# Patient Record
Sex: MALE | Race: WHITE | NOT HISPANIC OR LATINO | Employment: PART TIME | URBAN - NONMETROPOLITAN AREA
[De-identification: names, ages, dates, MRNs, and addresses within clinical notes are randomized per-mention and may not be internally consistent; named-entity substitution may affect disease eponyms.]

---

## 2017-12-19 ENCOUNTER — TRANSCRIBE ORDERS (OUTPATIENT)
Dept: ADMINISTRATIVE | Facility: HOSPITAL | Age: 77
End: 2017-12-19

## 2017-12-19 DIAGNOSIS — I43 CARDIOMYOPATHY IN DISEASE CLASSIFIED ELSEWHERE (HCC): ICD-10-CM

## 2017-12-19 DIAGNOSIS — I42.9 CARDIOMYOPATHY, UNSPECIFIED TYPE (HCC): Primary | ICD-10-CM

## 2017-12-20 ENCOUNTER — HOSPITAL ENCOUNTER (OUTPATIENT)
Dept: CARDIOLOGY | Facility: HOSPITAL | Age: 77
Discharge: HOME | End: 2017-12-20
Attending: INTERNAL MEDICINE | Admitting: INTERNAL MEDICINE

## 2017-12-20 DIAGNOSIS — I43 CARDIOMYOPATHY IN DISEASE CLASSIFIED ELSEWHERE (HCC): ICD-10-CM

## 2017-12-20 LAB
BH CV ECHO MEAS - % IVS THICK: -5.4 %
BH CV ECHO MEAS - % LVPW THICK: 10 %
BH CV ECHO MEAS - ACS: 2.8 CM
BH CV ECHO MEAS - AO MAX PG: 11.1 MMHG
BH CV ECHO MEAS - AO MEAN PG: 4.6 MMHG
BH CV ECHO MEAS - AO ROOT AREA (BSA CORRECTED): 2.2
BH CV ECHO MEAS - AO ROOT AREA: 15.8 CM^2
BH CV ECHO MEAS - AO ROOT DIAM: 4.5 CM
BH CV ECHO MEAS - AO V2 MAX: 167 CM/SEC
BH CV ECHO MEAS - AO V2 MEAN: 92.7 CM/SEC
BH CV ECHO MEAS - AO V2 VTI: 32.3 CM
BH CV ECHO MEAS - BSA(HAYCOCK): 2.1 M^2
BH CV ECHO MEAS - BSA: 2.1 M^2
BH CV ECHO MEAS - BZI_BMI: 27.5 KILOGRAMS/M^2
BH CV ECHO MEAS - BZI_METRIC_HEIGHT: 177.8 CM
BH CV ECHO MEAS - BZI_METRIC_WEIGHT: 87.1 KG
BH CV ECHO MEAS - CONTRAST EF 4CH: 48.5 ML/M^2
BH CV ECHO MEAS - EDV(CUBED): 220.9 ML
BH CV ECHO MEAS - EDV(MOD-SP4): 68 ML
BH CV ECHO MEAS - EDV(TEICH): 183.1 ML
BH CV ECHO MEAS - EF(CUBED): 37.5 %
BH CV ECHO MEAS - EF(TEICH): 30.2 %
BH CV ECHO MEAS - ESV(CUBED): 138.1 ML
BH CV ECHO MEAS - ESV(MOD-SP4): 35 ML
BH CV ECHO MEAS - ESV(TEICH): 127.7 ML
BH CV ECHO MEAS - FS: 14.5 %
BH CV ECHO MEAS - IVS/LVPW: 1.1
BH CV ECHO MEAS - IVSD: 1.5 CM
BH CV ECHO MEAS - IVSS: 1.4 CM
BH CV ECHO MEAS - LA DIMENSION: 3.7 CM
BH CV ECHO MEAS - LA/AO: 0.83
BH CV ECHO MEAS - LV DIASTOLIC VOL/BSA (35-75): 33.1 ML/M^2
BH CV ECHO MEAS - LV MASS(C)D: 404.4 GRAMS
BH CV ECHO MEAS - LV MASS(C)DI: 197.1 GRAMS/M^2
BH CV ECHO MEAS - LV MASS(C)S: 324.9 GRAMS
BH CV ECHO MEAS - LV MASS(C)SI: 158.4 GRAMS/M^2
BH CV ECHO MEAS - LV SYSTOLIC VOL/BSA (12-30): 17.1 ML/M^2
BH CV ECHO MEAS - LVIDD: 6 CM
BH CV ECHO MEAS - LVIDS: 5.2 CM
BH CV ECHO MEAS - LVLD AP4: 7 CM
BH CV ECHO MEAS - LVLS AP4: 6.8 CM
BH CV ECHO MEAS - LVOT AREA (M): 3.5 CM^2
BH CV ECHO MEAS - LVOT AREA: 3.4 CM^2
BH CV ECHO MEAS - LVOT DIAM: 2.1 CM
BH CV ECHO MEAS - LVPWD: 1.3 CM
BH CV ECHO MEAS - LVPWS: 1.5 CM
BH CV ECHO MEAS - PA ACC SLOPE: 1577 CM/SEC^2
BH CV ECHO MEAS - PA ACC TIME: 0.08 SEC
BH CV ECHO MEAS - PA PR(ACCEL): 44.1 MMHG
BH CV ECHO MEAS - RVDD: 2.1 CM
BH CV ECHO MEAS - SI(AO): 248.4 ML/M^2
BH CV ECHO MEAS - SI(CUBED): 40.3 ML/M^2
BH CV ECHO MEAS - SI(MOD-SP4): 16.1 ML/M^2
BH CV ECHO MEAS - SI(TEICH): 27 ML/M^2
BH CV ECHO MEAS - SV(AO): 509.6 ML
BH CV ECHO MEAS - SV(CUBED): 82.8 ML
BH CV ECHO MEAS - SV(MOD-SP4): 33 ML
BH CV ECHO MEAS - SV(TEICH): 55.4 ML
MAXIMAL PREDICTED HEART RATE: 145 BPM
STRESS TARGET HR: 123 BPM

## 2017-12-20 PROCEDURE — 93306 TTE W/DOPPLER COMPLETE: CPT | Performed by: INTERNAL MEDICINE

## 2017-12-20 PROCEDURE — 93306 TTE W/DOPPLER COMPLETE: CPT

## 2018-02-12 ENCOUNTER — HOSPITAL ENCOUNTER (OUTPATIENT)
Dept: RESPIRATORY THERAPY | Facility: HOSPITAL | Age: 78
Discharge: HOME | End: 2018-02-12
Attending: INTERNAL MEDICINE | Admitting: INTERNAL MEDICINE

## 2018-02-12 ENCOUNTER — OFFICE VISIT (OUTPATIENT)
Dept: CARDIOLOGY | Facility: CLINIC | Age: 78
End: 2018-02-12

## 2018-02-12 VITALS
RESPIRATION RATE: 16 BRPM | DIASTOLIC BLOOD PRESSURE: 78 MMHG | HEART RATE: 82 BPM | HEIGHT: 70 IN | BODY MASS INDEX: 28.06 KG/M2 | SYSTOLIC BLOOD PRESSURE: 142 MMHG | WEIGHT: 196 LBS

## 2018-02-12 DIAGNOSIS — E78.2 MIXED HYPERLIPIDEMIA: ICD-10-CM

## 2018-02-12 DIAGNOSIS — C61 PROSTATE CANCER (HCC): ICD-10-CM

## 2018-02-12 DIAGNOSIS — I25.10 ASCVD (ARTERIOSCLEROTIC CARDIOVASCULAR DISEASE): Primary | ICD-10-CM

## 2018-02-12 DIAGNOSIS — I10 ESSENTIAL HYPERTENSION: ICD-10-CM

## 2018-02-12 DIAGNOSIS — I49.3 FREQUENT PVCS: ICD-10-CM

## 2018-02-12 DIAGNOSIS — I25.5 ISCHEMIC CARDIOMYOPATHY: ICD-10-CM

## 2018-02-12 DIAGNOSIS — E11.9 TYPE 2 DIABETES MELLITUS WITHOUT COMPLICATION, WITHOUT LONG-TERM CURRENT USE OF INSULIN (HCC): ICD-10-CM

## 2018-02-12 PROCEDURE — 99214 OFFICE O/P EST MOD 30 MIN: CPT | Performed by: INTERNAL MEDICINE

## 2018-02-12 PROCEDURE — 93225 XTRNL ECG REC<48 HRS REC: CPT

## 2018-02-12 PROCEDURE — 93226 XTRNL ECG REC<48 HR SCAN A/R: CPT

## 2018-02-12 PROCEDURE — 93227 XTRNL ECG REC<48 HR R&I: CPT | Performed by: INTERNAL MEDICINE

## 2018-02-12 RX ORDER — INSULIN GLARGINE 100 [IU]/ML
20 INJECTION, SOLUTION SUBCUTANEOUS 2 TIMES DAILY
COMMUNITY
End: 2020-05-08

## 2018-02-21 ENCOUNTER — TELEPHONE (OUTPATIENT)
Dept: CARDIOLOGY | Facility: CLINIC | Age: 78
End: 2018-02-21

## 2018-02-27 RX ORDER — LISINOPRIL 5 MG/1
5 TABLET ORAL DAILY
Qty: 90 TABLET | Refills: 3 | Status: CANCELLED | OUTPATIENT
Start: 2018-02-27

## 2018-02-28 ENCOUNTER — HOSPITAL ENCOUNTER (OUTPATIENT)
Dept: CARDIOLOGY | Facility: HOSPITAL | Age: 78
Discharge: HOME | End: 2018-02-28
Attending: INTERNAL MEDICINE

## 2018-02-28 ENCOUNTER — HOSPITAL ENCOUNTER (OUTPATIENT)
Dept: NUCLEAR MEDICINE | Facility: HOSPITAL | Age: 78
Discharge: HOME | End: 2018-02-28
Attending: INTERNAL MEDICINE

## 2018-02-28 DIAGNOSIS — I25.5 ISCHEMIC CARDIOMYOPATHY: ICD-10-CM

## 2018-02-28 DIAGNOSIS — I25.10 ASCVD (ARTERIOSCLEROTIC CARDIOVASCULAR DISEASE): ICD-10-CM

## 2018-02-28 LAB
BH CV NUCLEAR PRIOR STUDY: 3
BH CV STRESS BP STAGE 1: NORMAL
BH CV STRESS BP STAGE 2: NORMAL
BH CV STRESS COMMENTS STAGE 1: NORMAL
BH CV STRESS COMMENTS STAGE 2: NORMAL
BH CV STRESS DOSE REGADENOSON STAGE 1: 0.4
BH CV STRESS DURATION MIN STAGE 1: 0
BH CV STRESS DURATION MIN STAGE 2: 4
BH CV STRESS DURATION SEC STAGE 1: 10
BH CV STRESS DURATION SEC STAGE 2: 0
BH CV STRESS HR STAGE 1: 99
BH CV STRESS HR STAGE 2: 82
BH CV STRESS PROTOCOL 1: NORMAL
BH CV STRESS RECOVERY BP: NORMAL MMHG
BH CV STRESS RECOVERY HR: 82 BPM
BH CV STRESS STAGE 1: 1
BH CV STRESS STAGE 2: 2
LV EF NUC BP: 35 %
MAXIMAL PREDICTED HEART RATE: 145 BPM
PERCENT MAX PREDICTED HR: 68.28 %
STRESS BASELINE BP: NORMAL MMHG
STRESS BASELINE HR: 71 BPM
STRESS PERCENT HR: 80 %
STRESS POST PEAK BP: NORMAL MMHG
STRESS POST PEAK HR: 99 BPM
STRESS TARGET HR: 123 BPM

## 2018-02-28 PROCEDURE — A9500 TC99M SESTAMIBI: HCPCS | Performed by: INTERNAL MEDICINE

## 2018-02-28 PROCEDURE — 93017 CV STRESS TEST TRACING ONLY: CPT

## 2018-02-28 PROCEDURE — 0 TECHNETIUM SESTAMIBI: Performed by: INTERNAL MEDICINE

## 2018-02-28 PROCEDURE — 93018 CV STRESS TEST I&R ONLY: CPT | Performed by: INTERNAL MEDICINE

## 2018-02-28 PROCEDURE — 78452 HT MUSCLE IMAGE SPECT MULT: CPT | Performed by: INTERNAL MEDICINE

## 2018-02-28 PROCEDURE — 78452 HT MUSCLE IMAGE SPECT MULT: CPT

## 2018-02-28 PROCEDURE — 25010000002 REGADENOSON 0.4 MG/5ML SOLUTION: Performed by: INTERNAL MEDICINE

## 2018-02-28 RX ADMIN — TECHNETIUM TC 99M SESTAMIBI 1 DOSE: 1 INJECTION INTRAVENOUS at 09:20

## 2018-02-28 RX ADMIN — REGADENOSON 0.4 MG: 0.08 INJECTION, SOLUTION INTRAVENOUS at 09:20

## 2018-02-28 RX ADMIN — TECHNETIUM TC 99M SESTAMIBI 1 DOSE: 1 INJECTION INTRAVENOUS at 07:35

## 2018-03-07 ENCOUNTER — TELEPHONE (OUTPATIENT)
Dept: CARDIOLOGY | Facility: CLINIC | Age: 78
End: 2018-03-07

## 2018-03-09 RX ORDER — LISINOPRIL 5 MG/1
5 TABLET ORAL DAILY
Qty: 30 TABLET | Refills: 2 | Status: SHIPPED | OUTPATIENT
Start: 2018-03-09 | End: 2018-03-19

## 2018-03-19 ENCOUNTER — OFFICE VISIT (OUTPATIENT)
Dept: CARDIOLOGY | Facility: CLINIC | Age: 78
End: 2018-03-19

## 2018-03-19 VITALS
HEIGHT: 71 IN | HEART RATE: 84 BPM | SYSTOLIC BLOOD PRESSURE: 140 MMHG | OXYGEN SATURATION: 95 % | DIASTOLIC BLOOD PRESSURE: 72 MMHG | BODY MASS INDEX: 28.48 KG/M2 | WEIGHT: 203.4 LBS

## 2018-03-19 DIAGNOSIS — I25.5 ISCHEMIC CARDIOMYOPATHY: ICD-10-CM

## 2018-03-19 DIAGNOSIS — I10 ESSENTIAL HYPERTENSION: ICD-10-CM

## 2018-03-19 DIAGNOSIS — E78.2 MIXED HYPERLIPIDEMIA: ICD-10-CM

## 2018-03-19 DIAGNOSIS — I25.10 ASCVD (ARTERIOSCLEROTIC CARDIOVASCULAR DISEASE): Primary | ICD-10-CM

## 2018-03-19 PROCEDURE — 99214 OFFICE O/P EST MOD 30 MIN: CPT | Performed by: INTERNAL MEDICINE

## 2018-03-19 RX ORDER — ATORVASTATIN CALCIUM 40 MG/1
40 TABLET, FILM COATED ORAL NIGHTLY
COMMUNITY

## 2018-03-19 RX ORDER — IBUPROFEN 800 MG/1
800 TABLET ORAL EVERY 8 HOURS PRN
COMMUNITY
End: 2018-09-16

## 2018-03-19 RX ORDER — CARVEDILOL 6.25 MG/1
6.25 TABLET ORAL 2 TIMES DAILY
Qty: 60 TABLET | Refills: 5 | Status: SHIPPED | OUTPATIENT
Start: 2018-03-19 | End: 2019-02-08 | Stop reason: SDUPTHER

## 2018-03-19 RX ORDER — NABUMETONE 500 MG/1
500 TABLET, FILM COATED ORAL 2 TIMES DAILY PRN
COMMUNITY
End: 2023-03-30

## 2018-03-19 RX ORDER — GLIMEPIRIDE 2 MG/1
2 TABLET ORAL 2 TIMES DAILY
COMMUNITY

## 2018-03-19 RX ORDER — LISINOPRIL 5 MG/1
10 TABLET ORAL DAILY
Qty: 60 TABLET | Refills: 5 | Status: SHIPPED | OUTPATIENT
Start: 2018-03-19 | End: 2018-06-07 | Stop reason: SDUPTHER

## 2018-03-19 RX ORDER — LISINOPRIL 5 MG/1
10 TABLET ORAL DAILY
Qty: 60 TABLET | Refills: 5 | Status: SHIPPED | OUTPATIENT
Start: 2018-03-19 | End: 2018-03-19 | Stop reason: SDUPTHER

## 2018-04-04 ENCOUNTER — HOSPITAL ENCOUNTER (OUTPATIENT)
Dept: CARDIOLOGY | Facility: HOSPITAL | Age: 78
Discharge: HOME | End: 2018-04-04
Attending: INTERNAL MEDICINE

## 2018-04-18 ENCOUNTER — HOSPITAL ENCOUNTER (OUTPATIENT)
Dept: CARDIOLOGY | Facility: HOSPITAL | Age: 78
Discharge: HOME | End: 2018-04-18
Attending: INTERNAL MEDICINE | Admitting: INTERNAL MEDICINE

## 2018-04-18 DIAGNOSIS — I25.5 ISCHEMIC CARDIOMYOPATHY: ICD-10-CM

## 2018-04-18 PROCEDURE — 93306 TTE W/DOPPLER COMPLETE: CPT

## 2018-04-18 PROCEDURE — 93306 TTE W/DOPPLER COMPLETE: CPT | Performed by: INTERNAL MEDICINE

## 2018-04-22 LAB
BH CV ECHO MEAS - % IVS THICK: -21.8 %
BH CV ECHO MEAS - % LVPW THICK: 24.9 %
BH CV ECHO MEAS - ACS: 1.8 CM
BH CV ECHO MEAS - AO MAX PG: 14.4 MMHG
BH CV ECHO MEAS - AO MEAN PG: 4.9 MMHG
BH CV ECHO MEAS - AO ROOT AREA (BSA CORRECTED): 1.9
BH CV ECHO MEAS - AO ROOT AREA: 12.8 CM^2
BH CV ECHO MEAS - AO ROOT DIAM: 4 CM
BH CV ECHO MEAS - AO V2 MAX: 190 CM/SEC
BH CV ECHO MEAS - AO V2 MEAN: 97.2 CM/SEC
BH CV ECHO MEAS - AO V2 VTI: 28.8 CM
BH CV ECHO MEAS - BSA(HAYCOCK): 2.2 M^2
BH CV ECHO MEAS - BSA: 2.1 M^2
BH CV ECHO MEAS - BZI_BMI: 28.3 KILOGRAMS/M^2
BH CV ECHO MEAS - BZI_METRIC_HEIGHT: 180.3 CM
BH CV ECHO MEAS - BZI_METRIC_WEIGHT: 92.1 KG
BH CV ECHO MEAS - CONTRAST EF 4CH: 20.3 ML/M^2
BH CV ECHO MEAS - EDV(CUBED): 135.8 ML
BH CV ECHO MEAS - EDV(MOD-SP4): 69 ML
BH CV ECHO MEAS - EDV(TEICH): 126.1 ML
BH CV ECHO MEAS - EF(CUBED): 21.5 %
BH CV ECHO MEAS - EF(MOD-SP4): 20.3 %
BH CV ECHO MEAS - EF(TEICH): 17.1 %
BH CV ECHO MEAS - ESV(CUBED): 106.6 ML
BH CV ECHO MEAS - ESV(MOD-SP4): 55 ML
BH CV ECHO MEAS - ESV(TEICH): 104.5 ML
BH CV ECHO MEAS - FS: 7.8 %
BH CV ECHO MEAS - IVS/LVPW: 1.3
BH CV ECHO MEAS - IVSD: 1.8 CM
BH CV ECHO MEAS - IVSS: 1.4 CM
BH CV ECHO MEAS - LA DIMENSION: 4.3 CM
BH CV ECHO MEAS - LA/AO: 1.1
BH CV ECHO MEAS - LV DIASTOLIC VOL/BSA (35-75): 32.5 ML/M^2
BH CV ECHO MEAS - LV MASS(C)D: 359.7 GRAMS
BH CV ECHO MEAS - LV MASS(C)DI: 169.5 GRAMS/M^2
BH CV ECHO MEAS - LV MASS(C)S: 311.8 GRAMS
BH CV ECHO MEAS - LV MASS(C)SI: 146.9 GRAMS/M^2
BH CV ECHO MEAS - LV SYSTOLIC VOL/BSA (12-30): 25.9 ML/M^2
BH CV ECHO MEAS - LVIDD: 5.1 CM
BH CV ECHO MEAS - LVIDS: 4.7 CM
BH CV ECHO MEAS - LVLD AP4: 7.3 CM
BH CV ECHO MEAS - LVLS AP4: 7.4 CM
BH CV ECHO MEAS - LVOT AREA (M): 4.9 CM^2
BH CV ECHO MEAS - LVOT AREA: 5.1 CM^2
BH CV ECHO MEAS - LVOT DIAM: 2.5 CM
BH CV ECHO MEAS - LVPWD: 1.4 CM
BH CV ECHO MEAS - LVPWS: 1.7 CM
BH CV ECHO MEAS - MV A MAX VEL: 83.4 CM/SEC
BH CV ECHO MEAS - MV E MAX VEL: 35.5 CM/SEC
BH CV ECHO MEAS - MV E/A: 0.43
BH CV ECHO MEAS - PA ACC SLOPE: 1171 CM/SEC^2
BH CV ECHO MEAS - PA ACC TIME: 0.09 SEC
BH CV ECHO MEAS - PA PR(ACCEL): 37.8 MMHG
BH CV ECHO MEAS - RVDD: 0.94 CM
BH CV ECHO MEAS - SI(AO): 173.1 ML/M^2
BH CV ECHO MEAS - SI(CUBED): 13.8 ML/M^2
BH CV ECHO MEAS - SI(MOD-SP4): 6.6 ML/M^2
BH CV ECHO MEAS - SI(TEICH): 10.2 ML/M^2
BH CV ECHO MEAS - SV(AO): 367.3 ML
BH CV ECHO MEAS - SV(CUBED): 29.2 ML
BH CV ECHO MEAS - SV(MOD-SP4): 14 ML
BH CV ECHO MEAS - SV(TEICH): 21.6 ML

## 2018-04-24 DIAGNOSIS — I25.5 ISCHEMIC CARDIOMYOPATHY: Primary | ICD-10-CM

## 2018-04-30 DIAGNOSIS — I25.5 ISCHEMIC CARDIOMYOPATHY: Primary | ICD-10-CM

## 2018-06-07 RX ORDER — LISINOPRIL 10 MG/1
10 TABLET ORAL DAILY
Qty: 90 TABLET | Refills: 3 | Status: SHIPPED | OUTPATIENT
Start: 2018-06-07

## 2018-07-11 PROBLEM — I44.7 LBBB (LEFT BUNDLE BRANCH BLOCK): Status: ACTIVE | Noted: 2018-07-11

## 2018-07-13 ENCOUNTER — CONSULT (OUTPATIENT)
Dept: CARDIOLOGY | Facility: CLINIC | Age: 78
End: 2018-07-13

## 2018-07-13 VITALS
WEIGHT: 195 LBS | HEART RATE: 63 BPM | BODY MASS INDEX: 27.3 KG/M2 | HEIGHT: 71 IN | DIASTOLIC BLOOD PRESSURE: 71 MMHG | SYSTOLIC BLOOD PRESSURE: 129 MMHG

## 2018-07-13 DIAGNOSIS — I25.10 ASCVD (ARTERIOSCLEROTIC CARDIOVASCULAR DISEASE): ICD-10-CM

## 2018-07-13 DIAGNOSIS — I44.7 LBBB (LEFT BUNDLE BRANCH BLOCK): ICD-10-CM

## 2018-07-13 DIAGNOSIS — I10 ESSENTIAL HYPERTENSION: ICD-10-CM

## 2018-07-13 DIAGNOSIS — I25.5 ISCHEMIC CARDIOMYOPATHY: Primary | ICD-10-CM

## 2018-07-13 PROCEDURE — 93000 ELECTROCARDIOGRAM COMPLETE: CPT | Performed by: NURSE PRACTITIONER

## 2018-07-13 PROCEDURE — 99204 OFFICE O/P NEW MOD 45 MIN: CPT | Performed by: INTERNAL MEDICINE

## 2018-08-02 ENCOUNTER — TELEPHONE (OUTPATIENT)
Dept: CARDIOLOGY | Facility: CLINIC | Age: 78
End: 2018-08-02

## 2018-08-02 DIAGNOSIS — I25.5 ISCHEMIC CARDIOMYOPATHY: Primary | ICD-10-CM

## 2018-09-05 ENCOUNTER — PREP FOR SURGERY (OUTPATIENT)
Dept: OTHER | Facility: HOSPITAL | Age: 78
End: 2018-09-05

## 2018-09-05 DIAGNOSIS — I25.5 ISCHEMIC CARDIOMYOPATHY: Primary | ICD-10-CM

## 2018-09-05 RX ORDER — CEFAZOLIN SODIUM 2 G/100ML
2 INJECTION, SOLUTION INTRAVENOUS
Status: CANCELLED | OUTPATIENT
Start: 2018-09-06 | End: 2018-09-07

## 2018-09-05 RX ORDER — NITROGLYCERIN 0.4 MG/1
0.4 TABLET SUBLINGUAL
Status: CANCELLED | OUTPATIENT
Start: 2018-09-05

## 2018-09-05 RX ORDER — ACETAMINOPHEN 325 MG/1
650 TABLET ORAL EVERY 4 HOURS PRN
Status: CANCELLED | OUTPATIENT
Start: 2018-09-05

## 2018-09-05 RX ORDER — PROMETHAZINE HYDROCHLORIDE 25 MG/ML
12.5 INJECTION, SOLUTION INTRAMUSCULAR; INTRAVENOUS EVERY 4 HOURS PRN
Status: CANCELLED | OUTPATIENT
Start: 2018-09-05

## 2018-09-05 RX ORDER — SODIUM CHLORIDE 0.9 % (FLUSH) 0.9 %
1-10 SYRINGE (ML) INJECTION AS NEEDED
Status: CANCELLED | OUTPATIENT
Start: 2018-09-05

## 2018-09-16 ENCOUNTER — APPOINTMENT (OUTPATIENT)
Dept: PREADMISSION TESTING | Facility: HOSPITAL | Age: 78
End: 2018-09-16

## 2018-09-16 DIAGNOSIS — I25.5 ISCHEMIC CARDIOMYOPATHY: ICD-10-CM

## 2018-09-16 LAB
ANION GAP SERPL CALCULATED.3IONS-SCNC: 9 MMOL/L (ref 3–11)
BUN BLD-MCNC: 21 MG/DL (ref 9–23)
BUN/CREAT SERPL: 21.2 (ref 7–25)
CALCIUM SPEC-SCNC: 9.1 MG/DL (ref 8.7–10.4)
CHLORIDE SERPL-SCNC: 104 MMOL/L (ref 99–109)
CO2 SERPL-SCNC: 28 MMOL/L (ref 20–31)
CREAT BLD-MCNC: 0.99 MG/DL (ref 0.6–1.3)
DEPRECATED RDW RBC AUTO: 43.2 FL (ref 37–54)
ERYTHROCYTE [DISTWIDTH] IN BLOOD BY AUTOMATED COUNT: 12.9 % (ref 11.3–14.5)
GFR SERPL CREATININE-BSD FRML MDRD: 73 ML/MIN/1.73
GLUCOSE BLD-MCNC: 233 MG/DL (ref 70–100)
HBA1C MFR BLD: 9.3 % (ref 4.8–5.6)
HCT VFR BLD AUTO: 40.6 % (ref 38.9–50.9)
HGB BLD-MCNC: 13.7 G/DL (ref 13.1–17.5)
INR PPP: 0.96 (ref 0.91–1.09)
MCH RBC QN AUTO: 31.1 PG (ref 27–31)
MCHC RBC AUTO-ENTMCNC: 33.7 G/DL (ref 32–36)
MCV RBC AUTO: 92.1 FL (ref 80–99)
PLATELET # BLD AUTO: 142 10*3/MM3 (ref 150–450)
PMV BLD AUTO: 9.2 FL (ref 6–12)
POTASSIUM BLD-SCNC: 4.8 MMOL/L (ref 3.5–5.5)
PROTHROMBIN TIME: 10.1 SECONDS (ref 9.6–11.5)
RBC # BLD AUTO: 4.41 10*6/MM3 (ref 4.2–5.76)
SODIUM BLD-SCNC: 141 MMOL/L (ref 132–146)
WBC NRBC COR # BLD: 6.72 10*3/MM3 (ref 3.5–10.8)

## 2018-09-16 PROCEDURE — 85027 COMPLETE CBC AUTOMATED: CPT | Performed by: NURSE PRACTITIONER

## 2018-09-16 PROCEDURE — 80048 BASIC METABOLIC PNL TOTAL CA: CPT | Performed by: NURSE PRACTITIONER

## 2018-09-16 PROCEDURE — 85610 PROTHROMBIN TIME: CPT | Performed by: NURSE PRACTITIONER

## 2018-09-16 PROCEDURE — 83036 HEMOGLOBIN GLYCOSYLATED A1C: CPT | Performed by: NURSE PRACTITIONER

## 2018-09-16 PROCEDURE — 36415 COLL VENOUS BLD VENIPUNCTURE: CPT

## 2018-09-17 DIAGNOSIS — I25.5 ISCHEMIC CARDIOMYOPATHY: Primary | ICD-10-CM

## 2018-09-19 ENCOUNTER — APPOINTMENT (OUTPATIENT)
Dept: PREADMISSION TESTING | Facility: HOSPITAL | Age: 78
End: 2018-09-19

## 2018-09-20 ENCOUNTER — APPOINTMENT (OUTPATIENT)
Dept: CARDIOLOGY | Facility: HOSPITAL | Age: 78
End: 2018-09-20
Attending: INTERNAL MEDICINE

## 2018-09-20 ENCOUNTER — HOSPITAL ENCOUNTER (OUTPATIENT)
Facility: HOSPITAL | Age: 78
Discharge: HOME | End: 2018-09-21
Attending: INTERNAL MEDICINE | Admitting: INTERNAL MEDICINE

## 2018-09-20 DIAGNOSIS — I25.5 ISCHEMIC CARDIOMYOPATHY: ICD-10-CM

## 2018-09-20 LAB
BH CV ECHO MEAS - EF(MOD-BP): 27 %
BH CV ECHO MEAS - FS: 11 %
BH CV ECHO MEAS - IVSD: 1.1 CM
BH CV ECHO MEAS - LAT PEAK E' VEL: 5.2 CM/SEC
BH CV ECHO MEAS - LVIDD: 6 CM
BH CV ECHO MEAS - LVIDS: 5.4 CM
BH CV ECHO MEAS - LVPWD: 1.1 CM
BH CV ECHO MEAS - MED PEAK E' VEL: 3 CM/SEC
BH CV ECHO MEAS - MV E MAX VEL: 43.5 CM/SEC
BH CV ECHO MEASUREMENTS AVERAGE E/E' RATIO: 10.61
BH CV VAS BP RIGHT ARM: NORMAL MMHG
GLUCOSE BLDC GLUCOMTR-MCNC: 117 MG/DL (ref 70–130)
GLUCOSE BLDC GLUCOMTR-MCNC: 203 MG/DL (ref 70–130)
GLUCOSE BLDC GLUCOMTR-MCNC: 274 MG/DL (ref 70–130)
LEFT ATRIUM VOLUME INDEX: 31.2 ML/M2
LV EF 2D ECHO EST: 25 %
MAXIMAL PREDICTED HEART RATE: 144 BPM
STRESS TARGET HR: 122 BPM

## 2018-09-20 PROCEDURE — A9270 NON-COVERED ITEM OR SERVICE: HCPCS | Performed by: NURSE PRACTITIONER

## 2018-09-20 PROCEDURE — C1900 LEAD, CORONARY VENOUS: HCPCS | Performed by: INTERNAL MEDICINE

## 2018-09-20 PROCEDURE — A9270 NON-COVERED ITEM OR SERVICE: HCPCS | Performed by: INTERNAL MEDICINE

## 2018-09-20 PROCEDURE — 33249 INSJ/RPLCMT DEFIB W/LEAD(S): CPT | Performed by: INTERNAL MEDICINE

## 2018-09-20 PROCEDURE — C1882 AICD, OTHER THAN SING/DUAL: HCPCS | Performed by: INTERNAL MEDICINE

## 2018-09-20 PROCEDURE — 63710000001 ATORVASTATIN 40 MG TABLET: Performed by: NURSE PRACTITIONER

## 2018-09-20 PROCEDURE — C1769 GUIDE WIRE: HCPCS | Performed by: INTERNAL MEDICINE

## 2018-09-20 PROCEDURE — G0378 HOSPITAL OBSERVATION PER HR: HCPCS

## 2018-09-20 PROCEDURE — 93308 TTE F-UP OR LMTD: CPT

## 2018-09-20 PROCEDURE — 25010000003 CEFAZOLIN IN DEXTROSE 2-4 GM/100ML-% SOLUTION: Performed by: INTERNAL MEDICINE

## 2018-09-20 PROCEDURE — 93306 TTE W/DOPPLER COMPLETE: CPT

## 2018-09-20 PROCEDURE — 63710000001 INSULIN DETEMIR PER 5 UNITS: Performed by: NURSE PRACTITIONER

## 2018-09-20 PROCEDURE — C1892 INTRO/SHEATH,FIXED,PEEL-AWAY: HCPCS | Performed by: INTERNAL MEDICINE

## 2018-09-20 PROCEDURE — 93005 ELECTROCARDIOGRAM TRACING: CPT | Performed by: INTERNAL MEDICINE

## 2018-09-20 PROCEDURE — C1777 LEAD, AICD, ENDO SINGLE COIL: HCPCS | Performed by: INTERNAL MEDICINE

## 2018-09-20 PROCEDURE — 25010000002 MIDAZOLAM PER 1 MG: Performed by: INTERNAL MEDICINE

## 2018-09-20 PROCEDURE — 93325 DOPPLER ECHO COLOR FLOW MAPG: CPT

## 2018-09-20 PROCEDURE — C1730 CATH, EP, 19 OR FEW ELECT: HCPCS | Performed by: INTERNAL MEDICINE

## 2018-09-20 PROCEDURE — 93641 EP EVL 1/2CHMB PAC CVDFB TST: CPT | Performed by: INTERNAL MEDICINE

## 2018-09-20 PROCEDURE — 93321 DOPPLER ECHO F-UP/LMTD STD: CPT

## 2018-09-20 PROCEDURE — G0108 DIAB MANAGE TRN  PER INDIV: HCPCS | Performed by: REGISTERED NURSE

## 2018-09-20 PROCEDURE — C1898 LEAD, PMKR, OTHER THAN TRANS: HCPCS | Performed by: INTERNAL MEDICINE

## 2018-09-20 PROCEDURE — 93010 ELECTROCARDIOGRAM REPORT: CPT | Performed by: INTERNAL MEDICINE

## 2018-09-20 PROCEDURE — 25010000002 ONDANSETRON PER 1 MG: Performed by: INTERNAL MEDICINE

## 2018-09-20 PROCEDURE — 93325 DOPPLER ECHO COLOR FLOW MAPG: CPT | Performed by: INTERNAL MEDICINE

## 2018-09-20 PROCEDURE — 33225 L VENTRIC PACING LEAD ADD-ON: CPT | Performed by: INTERNAL MEDICINE

## 2018-09-20 PROCEDURE — 63710000001 INSULIN LISPRO (HUMAN) PER 5 UNITS: Performed by: INTERNAL MEDICINE

## 2018-09-20 PROCEDURE — 82962 GLUCOSE BLOOD TEST: CPT

## 2018-09-20 PROCEDURE — 99152 MOD SED SAME PHYS/QHP 5/>YRS: CPT | Performed by: INTERNAL MEDICINE

## 2018-09-20 PROCEDURE — 63710000001 TAMSULOSIN 0.4 MG CAPSULE: Performed by: NURSE PRACTITIONER

## 2018-09-20 PROCEDURE — 25010000002 FENTANYL CITRATE (PF) 100 MCG/2ML SOLUTION: Performed by: INTERNAL MEDICINE

## 2018-09-20 PROCEDURE — C1887 CATHETER, GUIDING: HCPCS | Performed by: INTERNAL MEDICINE

## 2018-09-20 PROCEDURE — 63710000001 CARVEDILOL 6.25 MG TABLET: Performed by: NURSE PRACTITIONER

## 2018-09-20 PROCEDURE — 25010000002 SULFUR HEXAFLUORIDE MICROSPH 60.7-25 MG RECONSTITUTED SUSPENSION: Performed by: INTERNAL MEDICINE

## 2018-09-20 PROCEDURE — 93321 DOPPLER ECHO F-UP/LMTD STD: CPT | Performed by: INTERNAL MEDICINE

## 2018-09-20 PROCEDURE — 63710000001 ASPIRIN 81 MG TABLET DELAYED-RELEASE: Performed by: NURSE PRACTITIONER

## 2018-09-20 PROCEDURE — 93308 TTE F-UP OR LMTD: CPT | Performed by: INTERNAL MEDICINE

## 2018-09-20 PROCEDURE — 0 IOPAMIDOL PER 1 ML: Performed by: INTERNAL MEDICINE

## 2018-09-20 DEVICE — CARDIAC RESYNCHRONIZATION THERAPY DEFIBRILLATOR
Type: IMPLANTABLE DEVICE | Status: FUNCTIONAL
Brand: VIGILANT™ X4 CRT-D

## 2018-09-20 DEVICE — PACE/SENSE LEAD
Type: IMPLANTABLE DEVICE | Status: FUNCTIONAL
Brand: ACUITY™ X4 SPIRAL L

## 2018-09-20 DEVICE — STEROX BIPOLAR IS-1 ATRIAL/VENTRICULAR
Type: IMPLANTABLE DEVICE | Status: FUNCTIONAL
Brand: FINELINE® II EZ STEROX

## 2018-09-20 DEVICE — STEROID-ELUTING BIPOLAR PACE/SENSE AND DEFIBRILLATION LEAD
Type: IMPLANTABLE DEVICE | Status: FUNCTIONAL
Brand: ENDOTAK RELIANCE® SG

## 2018-09-20 RX ORDER — LISINOPRIL 10 MG/1
10 TABLET ORAL EVERY MORNING
Status: DISCONTINUED | OUTPATIENT
Start: 2018-09-21 | End: 2018-09-21 | Stop reason: HOSPADM

## 2018-09-20 RX ORDER — DEXTROSE MONOHYDRATE 25 G/50ML
25 INJECTION, SOLUTION INTRAVENOUS
Status: DISCONTINUED | OUTPATIENT
Start: 2018-09-20 | End: 2018-09-21 | Stop reason: HOSPADM

## 2018-09-20 RX ORDER — TAMSULOSIN HYDROCHLORIDE 0.4 MG/1
0.4 CAPSULE ORAL NIGHTLY
Status: DISCONTINUED | OUTPATIENT
Start: 2018-09-20 | End: 2018-09-21 | Stop reason: HOSPADM

## 2018-09-20 RX ORDER — SODIUM CHLORIDE 0.9 % (FLUSH) 0.9 %
1-10 SYRINGE (ML) INJECTION AS NEEDED
Status: DISCONTINUED | OUTPATIENT
Start: 2018-09-20 | End: 2018-09-21 | Stop reason: HOSPADM

## 2018-09-20 RX ORDER — ONDANSETRON 2 MG/ML
INJECTION INTRAMUSCULAR; INTRAVENOUS AS NEEDED
Status: DISCONTINUED | OUTPATIENT
Start: 2018-09-20 | End: 2018-09-20 | Stop reason: HOSPADM

## 2018-09-20 RX ORDER — ACETAMINOPHEN 325 MG/1
650 TABLET ORAL EVERY 4 HOURS PRN
Status: DISCONTINUED | OUTPATIENT
Start: 2018-09-20 | End: 2018-09-20 | Stop reason: HOSPADM

## 2018-09-20 RX ORDER — ONDANSETRON 2 MG/ML
4 INJECTION INTRAMUSCULAR; INTRAVENOUS EVERY 6 HOURS PRN
Status: DISCONTINUED | OUTPATIENT
Start: 2018-09-20 | End: 2018-09-21 | Stop reason: HOSPADM

## 2018-09-20 RX ORDER — BUPIVACAINE HYDROCHLORIDE 5 MG/ML
INJECTION, SOLUTION EPIDURAL; INTRACAUDAL AS NEEDED
Status: DISCONTINUED | OUTPATIENT
Start: 2018-09-20 | End: 2018-09-20 | Stop reason: HOSPADM

## 2018-09-20 RX ORDER — OXYCODONE HYDROCHLORIDE AND ACETAMINOPHEN 5; 325 MG/1; MG/1
1 TABLET ORAL EVERY 4 HOURS PRN
Status: DISCONTINUED | OUTPATIENT
Start: 2018-09-20 | End: 2018-09-21 | Stop reason: HOSPADM

## 2018-09-20 RX ORDER — FENTANYL CITRATE 50 UG/ML
INJECTION, SOLUTION INTRAMUSCULAR; INTRAVENOUS AS NEEDED
Status: DISCONTINUED | OUTPATIENT
Start: 2018-09-20 | End: 2018-09-20 | Stop reason: HOSPADM

## 2018-09-20 RX ORDER — PROMETHAZINE HYDROCHLORIDE 25 MG/ML
12.5 INJECTION, SOLUTION INTRAMUSCULAR; INTRAVENOUS EVERY 4 HOURS PRN
Status: DISCONTINUED | OUTPATIENT
Start: 2018-09-20 | End: 2018-09-20 | Stop reason: HOSPADM

## 2018-09-20 RX ORDER — LIDOCAINE HYDROCHLORIDE 10 MG/ML
INJECTION, SOLUTION INFILTRATION; PERINEURAL AS NEEDED
Status: DISCONTINUED | OUTPATIENT
Start: 2018-09-20 | End: 2018-09-20 | Stop reason: HOSPADM

## 2018-09-20 RX ORDER — ATORVASTATIN CALCIUM 40 MG/1
40 TABLET, FILM COATED ORAL NIGHTLY
Status: DISCONTINUED | OUTPATIENT
Start: 2018-09-20 | End: 2018-09-21 | Stop reason: HOSPADM

## 2018-09-20 RX ORDER — CEFAZOLIN SODIUM 2 G/100ML
2 INJECTION, SOLUTION INTRAVENOUS
Status: COMPLETED | OUTPATIENT
Start: 2018-09-21 | End: 2018-09-20

## 2018-09-20 RX ORDER — NICOTINE POLACRILEX 4 MG
15 LOZENGE BUCCAL
Status: DISCONTINUED | OUTPATIENT
Start: 2018-09-20 | End: 2018-09-21 | Stop reason: HOSPADM

## 2018-09-20 RX ORDER — CARVEDILOL 6.25 MG/1
6.25 TABLET ORAL 2 TIMES DAILY
Status: DISCONTINUED | OUTPATIENT
Start: 2018-09-20 | End: 2018-09-21 | Stop reason: HOSPADM

## 2018-09-20 RX ORDER — ASPIRIN 81 MG/1
81 TABLET ORAL NIGHTLY
Status: DISCONTINUED | OUTPATIENT
Start: 2018-09-20 | End: 2018-09-21 | Stop reason: HOSPADM

## 2018-09-20 RX ORDER — MIDAZOLAM HYDROCHLORIDE 1 MG/ML
INJECTION INTRAMUSCULAR; INTRAVENOUS AS NEEDED
Status: DISCONTINUED | OUTPATIENT
Start: 2018-09-20 | End: 2018-09-20 | Stop reason: HOSPADM

## 2018-09-20 RX ORDER — SODIUM CHLORIDE 0.9 % (FLUSH) 0.9 %
1-10 SYRINGE (ML) INJECTION AS NEEDED
Status: DISCONTINUED | OUTPATIENT
Start: 2018-09-20 | End: 2018-09-20 | Stop reason: HOSPADM

## 2018-09-20 RX ORDER — NITROGLYCERIN 0.4 MG/1
0.4 TABLET SUBLINGUAL
Status: DISCONTINUED | OUTPATIENT
Start: 2018-09-20 | End: 2018-09-20 | Stop reason: HOSPADM

## 2018-09-20 RX ORDER — PANTOPRAZOLE SODIUM 40 MG/1
40 TABLET, DELAYED RELEASE ORAL EVERY MORNING
Status: DISCONTINUED | OUTPATIENT
Start: 2018-09-21 | End: 2018-09-21 | Stop reason: HOSPADM

## 2018-09-20 RX ORDER — CEFAZOLIN SODIUM 2 G/100ML
2 INJECTION, SOLUTION INTRAVENOUS EVERY 8 HOURS
Status: COMPLETED | OUTPATIENT
Start: 2018-09-20 | End: 2018-09-21

## 2018-09-20 RX ADMIN — CARVEDILOL 6.25 MG: 6.25 TABLET, FILM COATED ORAL at 22:22

## 2018-09-20 RX ADMIN — TAMSULOSIN HYDROCHLORIDE 0.4 MG: 0.4 CAPSULE ORAL at 22:22

## 2018-09-20 RX ADMIN — SULFUR HEXAFLUORIDE 2 ML: KIT at 13:50

## 2018-09-20 RX ADMIN — INSULIN DETEMIR 20 UNITS: 100 INJECTION, SOLUTION SUBCUTANEOUS at 22:23

## 2018-09-20 RX ADMIN — ATORVASTATIN CALCIUM 40 MG: 40 TABLET, FILM COATED ORAL at 22:23

## 2018-09-20 RX ADMIN — CEFAZOLIN SODIUM 2 G: 2 INJECTION, SOLUTION INTRAVENOUS at 22:21

## 2018-09-20 RX ADMIN — ASPIRIN 81 MG: 81 TABLET, COATED ORAL at 22:22

## 2018-09-20 RX ADMIN — CEFAZOLIN SODIUM 2 G: 2 INJECTION, SOLUTION INTRAVENOUS at 16:55

## 2018-09-20 RX ADMIN — INSULIN LISPRO 6 UNITS: 100 INJECTION, SOLUTION INTRAVENOUS; SUBCUTANEOUS at 22:21

## 2018-09-21 ENCOUNTER — APPOINTMENT (OUTPATIENT)
Dept: GENERAL RADIOLOGY | Facility: HOSPITAL | Age: 78
End: 2018-09-21

## 2018-09-21 VITALS
DIASTOLIC BLOOD PRESSURE: 67 MMHG | OXYGEN SATURATION: 96 % | RESPIRATION RATE: 9 BRPM | HEIGHT: 70 IN | TEMPERATURE: 98 F | SYSTOLIC BLOOD PRESSURE: 121 MMHG | WEIGHT: 189 LBS | HEART RATE: 61 BPM | BODY MASS INDEX: 27.06 KG/M2

## 2018-09-21 LAB — GLUCOSE BLDC GLUCOMTR-MCNC: 119 MG/DL (ref 70–130)

## 2018-09-21 PROCEDURE — 63710000001 OXYCODONE-ACETAMINOPHEN 5-325 MG TABLET: Performed by: INTERNAL MEDICINE

## 2018-09-21 PROCEDURE — 99024 POSTOP FOLLOW-UP VISIT: CPT | Performed by: INTERNAL MEDICINE

## 2018-09-21 PROCEDURE — G0378 HOSPITAL OBSERVATION PER HR: HCPCS

## 2018-09-21 PROCEDURE — 71046 X-RAY EXAM CHEST 2 VIEWS: CPT

## 2018-09-21 PROCEDURE — A9270 NON-COVERED ITEM OR SERVICE: HCPCS | Performed by: NURSE PRACTITIONER

## 2018-09-21 PROCEDURE — 93005 ELECTROCARDIOGRAM TRACING: CPT | Performed by: INTERNAL MEDICINE

## 2018-09-21 PROCEDURE — 63710000001 CARVEDILOL 6.25 MG TABLET: Performed by: NURSE PRACTITIONER

## 2018-09-21 PROCEDURE — 63710000001 LISINOPRIL 10 MG TABLET: Performed by: NURSE PRACTITIONER

## 2018-09-21 PROCEDURE — 82962 GLUCOSE BLOOD TEST: CPT

## 2018-09-21 PROCEDURE — 63710000001 PANTOPRAZOLE 40 MG TABLET DELAYED-RELEASE: Performed by: NURSE PRACTITIONER

## 2018-09-21 PROCEDURE — 25010000003 CEFAZOLIN IN DEXTROSE 2-4 GM/100ML-% SOLUTION: Performed by: INTERNAL MEDICINE

## 2018-09-21 PROCEDURE — 93010 ELECTROCARDIOGRAM REPORT: CPT | Performed by: INTERNAL MEDICINE

## 2018-09-21 PROCEDURE — 25010000003 CEFAZOLIN IN DEXTROSE 2-4 GM/100ML-% SOLUTION: Performed by: NURSE PRACTITIONER

## 2018-09-21 PROCEDURE — A9270 NON-COVERED ITEM OR SERVICE: HCPCS | Performed by: INTERNAL MEDICINE

## 2018-09-21 RX ADMIN — CARVEDILOL 6.25 MG: 6.25 TABLET, FILM COATED ORAL at 09:50

## 2018-09-21 RX ADMIN — LISINOPRIL 10 MG: 10 TABLET ORAL at 05:30

## 2018-09-21 RX ADMIN — PANTOPRAZOLE SODIUM 40 MG: 40 TABLET, DELAYED RELEASE ORAL at 05:30

## 2018-09-21 RX ADMIN — OXYCODONE HYDROCHLORIDE AND ACETAMINOPHEN 1 TABLET: 5; 325 TABLET ORAL at 04:13

## 2018-09-21 RX ADMIN — CEFAZOLIN SODIUM 2 G: 2 INJECTION, SOLUTION INTRAVENOUS at 05:29

## 2018-10-03 RX ORDER — CARVEDILOL 6.25 MG/1
TABLET ORAL
Qty: 60 TABLET | Refills: 4 | OUTPATIENT
Start: 2018-10-03

## 2018-10-05 RX ORDER — CARVEDILOL 6.25 MG/1
TABLET ORAL
Qty: 60 TABLET | Refills: 4 | OUTPATIENT
Start: 2018-10-05

## 2018-10-16 DIAGNOSIS — I25.5 ISCHEMIC CARDIOMYOPATHY: Primary | ICD-10-CM

## 2018-11-02 ENCOUNTER — TELEPHONE (OUTPATIENT)
Dept: CARDIOLOGY | Facility: CLINIC | Age: 78
End: 2018-11-02

## 2019-02-08 ENCOUNTER — OFFICE VISIT (OUTPATIENT)
Dept: CARDIOLOGY | Facility: CLINIC | Age: 79
End: 2019-02-08

## 2019-02-08 VITALS
BODY MASS INDEX: 27.2 KG/M2 | WEIGHT: 190 LBS | SYSTOLIC BLOOD PRESSURE: 154 MMHG | HEART RATE: 74 BPM | HEIGHT: 70 IN | DIASTOLIC BLOOD PRESSURE: 86 MMHG

## 2019-02-08 DIAGNOSIS — I10 ESSENTIAL HYPERTENSION: ICD-10-CM

## 2019-02-08 DIAGNOSIS — Z95.810 CARDIAC RESYNCHRONIZATION THERAPY DEFIBRILLATOR (CRT-D) IN PLACE: ICD-10-CM

## 2019-02-08 DIAGNOSIS — I25.5 ISCHEMIC CARDIOMYOPATHY: Primary | ICD-10-CM

## 2019-02-08 DIAGNOSIS — I44.7 LBBB (LEFT BUNDLE BRANCH BLOCK): ICD-10-CM

## 2019-02-08 PROBLEM — Z45.02 ICD (IMPLANTABLE CARDIOVERTER-DEFIBRILLATOR) BATTERY DEPLETION: Status: ACTIVE | Noted: 2019-02-08

## 2019-02-08 PROCEDURE — 93284 PRGRMG EVAL IMPLANTABLE DFB: CPT | Performed by: NURSE PRACTITIONER

## 2019-02-08 PROCEDURE — 99214 OFFICE O/P EST MOD 30 MIN: CPT | Performed by: NURSE PRACTITIONER

## 2019-02-08 RX ORDER — CARVEDILOL 6.25 MG/1
12.5 TABLET ORAL 2 TIMES DAILY
Qty: 60 TABLET | Refills: 5 | Status: SHIPPED | OUTPATIENT
Start: 2019-02-08 | End: 2019-06-04 | Stop reason: SDUPTHER

## 2019-03-06 ENCOUNTER — TELEPHONE (OUTPATIENT)
Dept: CARDIOLOGY | Facility: CLINIC | Age: 79
End: 2019-03-06

## 2019-04-01 ENCOUNTER — TELEPHONE (OUTPATIENT)
Dept: CARDIOLOGY | Facility: CLINIC | Age: 79
End: 2019-04-01

## 2019-04-08 ENCOUNTER — APPOINTMENT (OUTPATIENT)
Dept: CARDIOLOGY | Facility: HOSPITAL | Age: 79
End: 2019-04-08
Attending: INTERNAL MEDICINE

## 2019-06-04 RX ORDER — CARVEDILOL 6.25 MG/1
TABLET ORAL
Qty: 360 TABLET | Refills: 1 | Status: SHIPPED | OUTPATIENT
Start: 2019-06-04 | End: 2019-12-18 | Stop reason: SDUPTHER

## 2019-06-05 ENCOUNTER — CLINICAL SUPPORT NO REQUIREMENTS (OUTPATIENT)
Dept: CARDIOLOGY | Facility: CLINIC | Age: 79
End: 2019-06-05

## 2019-06-05 DIAGNOSIS — I25.5 ISCHEMIC CARDIOMYOPATHY: ICD-10-CM

## 2019-06-05 PROCEDURE — 93296 REM INTERROG EVL PM/IDS: CPT | Performed by: INTERNAL MEDICINE

## 2019-06-05 PROCEDURE — 93295 DEV INTERROG REMOTE 1/2/MLT: CPT | Performed by: INTERNAL MEDICINE

## 2019-09-06 ENCOUNTER — CLINICAL SUPPORT NO REQUIREMENTS (OUTPATIENT)
Dept: CARDIOLOGY | Facility: CLINIC | Age: 79
End: 2019-09-06

## 2019-09-06 DIAGNOSIS — I42.8 NON-ISCHEMIC CARDIOMYOPATHY (HCC): ICD-10-CM

## 2019-09-06 PROCEDURE — 93296 REM INTERROG EVL PM/IDS: CPT | Performed by: INTERNAL MEDICINE

## 2019-09-06 PROCEDURE — 93295 DEV INTERROG REMOTE 1/2/MLT: CPT | Performed by: INTERNAL MEDICINE

## 2019-10-04 ENCOUNTER — OFFICE VISIT (OUTPATIENT)
Dept: CARDIOLOGY | Facility: CLINIC | Age: 79
End: 2019-10-04

## 2019-10-04 VITALS
BODY MASS INDEX: 27.92 KG/M2 | HEART RATE: 73 BPM | OXYGEN SATURATION: 94 % | HEIGHT: 70 IN | DIASTOLIC BLOOD PRESSURE: 72 MMHG | WEIGHT: 195 LBS | SYSTOLIC BLOOD PRESSURE: 116 MMHG

## 2019-10-04 DIAGNOSIS — Z95.810 CARDIAC RESYNCHRONIZATION THERAPY DEFIBRILLATOR (CRT-D) IN PLACE: ICD-10-CM

## 2019-10-04 DIAGNOSIS — I44.7 LBBB (LEFT BUNDLE BRANCH BLOCK): ICD-10-CM

## 2019-10-04 DIAGNOSIS — I50.22 CHRONIC SYSTOLIC CONGESTIVE HEART FAILURE (HCC): Primary | ICD-10-CM

## 2019-10-04 PROCEDURE — 99213 OFFICE O/P EST LOW 20 MIN: CPT | Performed by: INTERNAL MEDICINE

## 2019-10-04 PROCEDURE — 93284 PRGRMG EVAL IMPLANTABLE DFB: CPT | Performed by: INTERNAL MEDICINE

## 2019-12-17 ENCOUNTER — LAB REQUISITION (OUTPATIENT)
Dept: LAB | Facility: HOSPITAL | Age: 79
End: 2019-12-17

## 2019-12-17 DIAGNOSIS — M72.0 PALMAR FASCIAL FIBROMATOSIS (DUPUYTREN): ICD-10-CM

## 2019-12-17 PROCEDURE — 88304 TISSUE EXAM BY PATHOLOGIST: CPT | Performed by: PLASTIC SURGERY

## 2019-12-18 LAB
CYTO UR: NORMAL
LAB AP CASE REPORT: NORMAL
LAB AP CLINICAL INFORMATION: NORMAL
PATH REPORT.FINAL DX SPEC: NORMAL
PATH REPORT.GROSS SPEC: NORMAL

## 2019-12-18 RX ORDER — CARVEDILOL 6.25 MG/1
TABLET ORAL
Qty: 180 TABLET | Refills: 2 | Status: SHIPPED | OUTPATIENT
Start: 2019-12-18 | End: 2020-05-15

## 2020-02-29 ENCOUNTER — CLINICAL SUPPORT NO REQUIREMENTS (OUTPATIENT)
Dept: CARDIOLOGY | Facility: CLINIC | Age: 80
End: 2020-02-29

## 2020-02-29 DIAGNOSIS — I25.5 ISCHEMIC CARDIOMYOPATHY: ICD-10-CM

## 2020-02-29 PROCEDURE — 93296 REM INTERROG EVL PM/IDS: CPT | Performed by: INTERNAL MEDICINE

## 2020-02-29 PROCEDURE — 93295 DEV INTERROG REMOTE 1/2/MLT: CPT | Performed by: INTERNAL MEDICINE

## 2020-05-08 ENCOUNTER — OFFICE VISIT (OUTPATIENT)
Dept: CARDIOLOGY | Facility: CLINIC | Age: 80
End: 2020-05-08

## 2020-05-08 VITALS — BODY MASS INDEX: 27.92 KG/M2 | HEIGHT: 70 IN | WEIGHT: 195 LBS

## 2020-05-08 DIAGNOSIS — I44.7 LBBB (LEFT BUNDLE BRANCH BLOCK): ICD-10-CM

## 2020-05-08 DIAGNOSIS — I50.22 CHRONIC SYSTOLIC CONGESTIVE HEART FAILURE (HCC): Primary | ICD-10-CM

## 2020-05-08 DIAGNOSIS — Z95.810 CARDIAC RESYNCHRONIZATION THERAPY DEFIBRILLATOR (CRT-D) IN PLACE: ICD-10-CM

## 2020-05-08 PROCEDURE — 99442 PR PHYS/QHP TELEPHONE EVALUATION 11-20 MIN: CPT | Performed by: INTERNAL MEDICINE

## 2020-05-15 RX ORDER — CARVEDILOL 6.25 MG/1
TABLET ORAL
Qty: 180 TABLET | Refills: 3 | Status: SHIPPED | OUTPATIENT
Start: 2020-05-15 | End: 2020-11-16

## 2020-11-13 ENCOUNTER — OFFICE VISIT (OUTPATIENT)
Dept: CARDIOLOGY | Facility: CLINIC | Age: 80
End: 2020-11-13

## 2020-11-13 VITALS
HEIGHT: 70 IN | DIASTOLIC BLOOD PRESSURE: 70 MMHG | WEIGHT: 195 LBS | BODY MASS INDEX: 27.92 KG/M2 | SYSTOLIC BLOOD PRESSURE: 112 MMHG | OXYGEN SATURATION: 98 % | HEART RATE: 78 BPM

## 2020-11-13 DIAGNOSIS — I50.22 CHRONIC SYSTOLIC CONGESTIVE HEART FAILURE (HCC): Primary | ICD-10-CM

## 2020-11-13 DIAGNOSIS — Z95.810 CARDIAC RESYNCHRONIZATION THERAPY DEFIBRILLATOR (CRT-D) IN PLACE: ICD-10-CM

## 2020-11-13 PROCEDURE — 93284 PRGRMG EVAL IMPLANTABLE DFB: CPT | Performed by: PHYSICIAN ASSISTANT

## 2020-11-13 PROCEDURE — 99213 OFFICE O/P EST LOW 20 MIN: CPT | Performed by: PHYSICIAN ASSISTANT

## 2020-11-17 RX ORDER — CARVEDILOL 12.5 MG/1
12.5 TABLET ORAL 2 TIMES DAILY
Qty: 180 TABLET | Refills: 3 | Status: SHIPPED | OUTPATIENT
Start: 2020-11-17

## 2020-11-28 ENCOUNTER — HOSPITAL ENCOUNTER (EMERGENCY)
Facility: HOSPITAL | Age: 80
Discharge: HOME | End: 2020-11-28
Attending: FAMILY MEDICINE | Admitting: FAMILY MEDICINE

## 2020-11-28 ENCOUNTER — APPOINTMENT (OUTPATIENT)
Dept: GENERAL RADIOLOGY | Facility: HOSPITAL | Age: 80
End: 2020-11-28

## 2020-11-28 VITALS
TEMPERATURE: 98 F | HEART RATE: 80 BPM | OXYGEN SATURATION: 98 % | SYSTOLIC BLOOD PRESSURE: 150 MMHG | DIASTOLIC BLOOD PRESSURE: 72 MMHG | HEIGHT: 70 IN | RESPIRATION RATE: 20 BRPM | BODY MASS INDEX: 27.2 KG/M2 | WEIGHT: 190 LBS

## 2020-11-28 DIAGNOSIS — Z20.822 ENCOUNTER FOR LABORATORY TESTING FOR COVID-19 VIRUS: Primary | ICD-10-CM

## 2020-11-28 LAB
FLUAV RNA RESP QL NAA+PROBE: NOT DETECTED
FLUBV RNA RESP QL NAA+PROBE: NOT DETECTED
SARS-COV-2 RNA RESP QL NAA+PROBE: NOT DETECTED

## 2020-11-28 PROCEDURE — 71045 X-RAY EXAM CHEST 1 VIEW: CPT | Performed by: RADIOLOGY

## 2020-11-28 PROCEDURE — 87636 SARSCOV2 & INF A&B AMP PRB: CPT | Performed by: FAMILY MEDICINE

## 2020-11-28 PROCEDURE — 71045 X-RAY EXAM CHEST 1 VIEW: CPT

## 2020-11-28 PROCEDURE — 99283 EMERGENCY DEPT VISIT LOW MDM: CPT

## 2021-01-25 ENCOUNTER — IMMUNIZATION (OUTPATIENT)
Dept: VACCINE CLINIC | Facility: HOSPITAL | Age: 81
End: 2021-01-25

## 2021-01-25 PROCEDURE — 0001A: CPT | Performed by: FAMILY MEDICINE

## 2021-01-25 PROCEDURE — 91300 HC SARSCOV02 VAC 30MCG/0.3ML IM: CPT | Performed by: FAMILY MEDICINE

## 2021-02-15 ENCOUNTER — APPOINTMENT (OUTPATIENT)
Dept: VACCINE CLINIC | Facility: HOSPITAL | Age: 81
End: 2021-02-15

## 2021-02-16 ENCOUNTER — IMMUNIZATION (OUTPATIENT)
Dept: VACCINE CLINIC | Facility: HOSPITAL | Age: 81
End: 2021-02-16

## 2021-02-16 PROCEDURE — 0002A: CPT | Performed by: INTERNAL MEDICINE

## 2021-02-16 PROCEDURE — 91300 HC SARSCOV02 VAC 30MCG/0.3ML IM: CPT | Performed by: INTERNAL MEDICINE

## 2021-03-14 PROCEDURE — 93295 DEV INTERROG REMOTE 1/2/MLT: CPT | Performed by: INTERNAL MEDICINE

## 2021-03-14 PROCEDURE — 93296 REM INTERROG EVL PM/IDS: CPT | Performed by: INTERNAL MEDICINE

## 2021-06-13 PROCEDURE — 93295 DEV INTERROG REMOTE 1/2/MLT: CPT | Performed by: INTERNAL MEDICINE

## 2021-06-13 PROCEDURE — 93296 REM INTERROG EVL PM/IDS: CPT | Performed by: INTERNAL MEDICINE

## 2021-07-09 ENCOUNTER — OFFICE VISIT (OUTPATIENT)
Dept: CARDIOLOGY | Facility: CLINIC | Age: 81
End: 2021-07-09

## 2021-07-09 VITALS
OXYGEN SATURATION: 95 % | DIASTOLIC BLOOD PRESSURE: 72 MMHG | WEIGHT: 190 LBS | HEIGHT: 70 IN | BODY MASS INDEX: 27.2 KG/M2 | HEART RATE: 88 BPM | SYSTOLIC BLOOD PRESSURE: 120 MMHG

## 2021-07-09 DIAGNOSIS — Z95.810 CARDIAC RESYNCHRONIZATION THERAPY DEFIBRILLATOR (CRT-D) IN PLACE: Primary | ICD-10-CM

## 2021-07-09 DIAGNOSIS — I50.22 CHRONIC SYSTOLIC CONGESTIVE HEART FAILURE (HCC): ICD-10-CM

## 2021-07-09 PROCEDURE — 93284 PRGRMG EVAL IMPLANTABLE DFB: CPT | Performed by: PHYSICIAN ASSISTANT

## 2021-07-09 PROCEDURE — 99213 OFFICE O/P EST LOW 20 MIN: CPT | Performed by: PHYSICIAN ASSISTANT

## 2021-12-12 PROCEDURE — 93295 DEV INTERROG REMOTE 1/2/MLT: CPT | Performed by: INTERNAL MEDICINE

## 2021-12-12 PROCEDURE — 93296 REM INTERROG EVL PM/IDS: CPT | Performed by: INTERNAL MEDICINE

## 2022-02-11 ENCOUNTER — OFFICE VISIT (OUTPATIENT)
Dept: CARDIOLOGY | Facility: CLINIC | Age: 82
End: 2022-02-11

## 2022-02-11 VITALS
HEIGHT: 70 IN | SYSTOLIC BLOOD PRESSURE: 138 MMHG | OXYGEN SATURATION: 97 % | BODY MASS INDEX: 27.49 KG/M2 | HEART RATE: 79 BPM | WEIGHT: 192 LBS | DIASTOLIC BLOOD PRESSURE: 70 MMHG

## 2022-02-11 DIAGNOSIS — I25.10 ASCVD (ARTERIOSCLEROTIC CARDIOVASCULAR DISEASE): ICD-10-CM

## 2022-02-11 DIAGNOSIS — I44.7 LBBB (LEFT BUNDLE BRANCH BLOCK): ICD-10-CM

## 2022-02-11 DIAGNOSIS — Z95.810 CARDIAC RESYNCHRONIZATION THERAPY DEFIBRILLATOR (CRT-D) IN PLACE: ICD-10-CM

## 2022-02-11 DIAGNOSIS — I10 ESSENTIAL HYPERTENSION: ICD-10-CM

## 2022-02-11 DIAGNOSIS — I25.5 ISCHEMIC CARDIOMYOPATHY: Primary | ICD-10-CM

## 2022-02-11 PROCEDURE — 93284 PRGRMG EVAL IMPLANTABLE DFB: CPT | Performed by: NURSE PRACTITIONER

## 2022-02-11 PROCEDURE — 99214 OFFICE O/P EST MOD 30 MIN: CPT | Performed by: NURSE PRACTITIONER

## 2022-02-11 RX ORDER — HYDROCODONE BITARTRATE AND ACETAMINOPHEN 5; 325 MG/1; MG/1
1 TABLET ORAL AS NEEDED
COMMUNITY
Start: 2022-01-18 | End: 2023-02-15

## 2022-03-09 ENCOUNTER — TELEPHONE (OUTPATIENT)
Dept: CARDIOLOGY | Facility: CLINIC | Age: 82
End: 2022-03-09

## 2022-03-12 ENCOUNTER — HOSPITAL ENCOUNTER (EMERGENCY)
Facility: HOSPITAL | Age: 82
Discharge: HOME | End: 2022-03-12
Attending: EMERGENCY MEDICINE | Admitting: EMERGENCY MEDICINE

## 2022-03-12 VITALS
DIASTOLIC BLOOD PRESSURE: 82 MMHG | OXYGEN SATURATION: 98 % | SYSTOLIC BLOOD PRESSURE: 152 MMHG | RESPIRATION RATE: 16 BRPM | HEIGHT: 70 IN | WEIGHT: 195 LBS | HEART RATE: 78 BPM | TEMPERATURE: 98 F | BODY MASS INDEX: 27.92 KG/M2

## 2022-03-12 DIAGNOSIS — E16.2 HYPOGLYCEMIA: Primary | ICD-10-CM

## 2022-03-12 LAB
ALBUMIN SERPL-MCNC: 4.27 G/DL (ref 3.5–5.2)
ALBUMIN/GLOB SERPL: 1.4 G/DL
ALP SERPL-CCNC: 76 U/L (ref 39–117)
ALT SERPL W P-5'-P-CCNC: 14 U/L (ref 1–41)
ANION GAP SERPL CALCULATED.3IONS-SCNC: 16 MMOL/L (ref 5–15)
AST SERPL-CCNC: 17 U/L (ref 1–40)
BASOPHILS # BLD AUTO: 0.03 10*3/MM3 (ref 0–0.2)
BASOPHILS NFR BLD AUTO: 0.3 % (ref 0–1.5)
BILIRUB SERPL-MCNC: 0.4 MG/DL (ref 0–1.2)
BILIRUB UR QL STRIP: NEGATIVE
BUN SERPL-MCNC: 11 MG/DL (ref 8–23)
BUN/CREAT SERPL: 11.7 (ref 7–25)
CALCIUM SPEC-SCNC: 9.6 MG/DL (ref 8.6–10.5)
CHLORIDE SERPL-SCNC: 101 MMOL/L (ref 98–107)
CLARITY UR: CLEAR
CO2 SERPL-SCNC: 21 MMOL/L (ref 22–29)
COLOR UR: YELLOW
CREAT SERPL-MCNC: 0.94 MG/DL (ref 0.76–1.27)
DEPRECATED RDW RBC AUTO: 41.4 FL (ref 37–54)
EGFRCR SERPLBLD CKD-EPI 2021: 82.5 ML/MIN/1.73
EOSINOPHIL # BLD AUTO: 0.03 10*3/MM3 (ref 0–0.4)
EOSINOPHIL NFR BLD AUTO: 0.3 % (ref 0.3–6.2)
ERYTHROCYTE [DISTWIDTH] IN BLOOD BY AUTOMATED COUNT: 12.3 % (ref 12.3–15.4)
GLOBULIN UR ELPH-MCNC: 3.1 GM/DL
GLUCOSE BLDC GLUCOMTR-MCNC: 193 MG/DL (ref 70–130)
GLUCOSE BLDC GLUCOMTR-MCNC: 74 MG/DL (ref 70–130)
GLUCOSE SERPL-MCNC: 154 MG/DL (ref 65–99)
GLUCOSE UR STRIP-MCNC: NEGATIVE MG/DL
HCT VFR BLD AUTO: 43.1 % (ref 37.5–51)
HGB BLD-MCNC: 14.3 G/DL (ref 13–17.7)
HGB UR QL STRIP.AUTO: NEGATIVE
HOLD SPECIMEN: NORMAL
HOLD SPECIMEN: NORMAL
IMM GRANULOCYTES # BLD AUTO: 0.05 10*3/MM3 (ref 0–0.05)
IMM GRANULOCYTES NFR BLD AUTO: 0.5 % (ref 0–0.5)
KETONES UR QL STRIP: ABNORMAL
LEUKOCYTE ESTERASE UR QL STRIP.AUTO: NEGATIVE
LYMPHOCYTES # BLD AUTO: 1.1 10*3/MM3 (ref 0.7–3.1)
LYMPHOCYTES NFR BLD AUTO: 10.8 % (ref 19.6–45.3)
MCH RBC QN AUTO: 30.4 PG (ref 26.6–33)
MCHC RBC AUTO-ENTMCNC: 33.2 G/DL (ref 31.5–35.7)
MCV RBC AUTO: 91.7 FL (ref 79–97)
MONOCYTES # BLD AUTO: 0.35 10*3/MM3 (ref 0.1–0.9)
MONOCYTES NFR BLD AUTO: 3.4 % (ref 5–12)
NEUTROPHILS NFR BLD AUTO: 8.64 10*3/MM3 (ref 1.7–7)
NEUTROPHILS NFR BLD AUTO: 84.7 % (ref 42.7–76)
NITRITE UR QL STRIP: NEGATIVE
NRBC BLD AUTO-RTO: 0 /100 WBC (ref 0–0.2)
PH UR STRIP.AUTO: <=5 [PH] (ref 5–8)
PLATELET # BLD AUTO: 130 10*3/MM3 (ref 140–450)
PMV BLD AUTO: 9.2 FL (ref 6–12)
POTASSIUM SERPL-SCNC: 4.3 MMOL/L (ref 3.5–5.2)
PROT SERPL-MCNC: 7.4 G/DL (ref 6–8.5)
PROT UR QL STRIP: NEGATIVE
RBC # BLD AUTO: 4.7 10*6/MM3 (ref 4.14–5.8)
SODIUM SERPL-SCNC: 138 MMOL/L (ref 136–145)
SP GR UR STRIP: 1.02 (ref 1–1.03)
UROBILINOGEN UR QL STRIP: ABNORMAL
WBC NRBC COR # BLD: 10.2 10*3/MM3 (ref 3.4–10.8)
WHOLE BLOOD HOLD SPECIMEN: NORMAL
WHOLE BLOOD HOLD SPECIMEN: NORMAL

## 2022-03-12 PROCEDURE — 99283 EMERGENCY DEPT VISIT LOW MDM: CPT

## 2022-03-12 PROCEDURE — 82962 GLUCOSE BLOOD TEST: CPT

## 2022-03-12 PROCEDURE — 81003 URINALYSIS AUTO W/O SCOPE: CPT | Performed by: PHYSICIAN ASSISTANT

## 2022-03-12 PROCEDURE — 85025 COMPLETE CBC W/AUTO DIFF WBC: CPT | Performed by: PHYSICIAN ASSISTANT

## 2022-03-12 PROCEDURE — 80053 COMPREHEN METABOLIC PANEL: CPT | Performed by: PHYSICIAN ASSISTANT

## 2022-03-12 RX ORDER — SODIUM CHLORIDE 0.9 % (FLUSH) 0.9 %
10 SYRINGE (ML) INJECTION AS NEEDED
Status: DISCONTINUED | OUTPATIENT
Start: 2022-03-12 | End: 2022-03-12 | Stop reason: HOSPADM

## 2022-09-11 PROCEDURE — 93295 DEV INTERROG REMOTE 1/2/MLT: CPT | Performed by: INTERNAL MEDICINE

## 2022-09-11 PROCEDURE — 93296 REM INTERROG EVL PM/IDS: CPT | Performed by: INTERNAL MEDICINE

## 2022-09-21 ENCOUNTER — TELEPHONE (OUTPATIENT)
Dept: CARDIOLOGY | Facility: CLINIC | Age: 82
End: 2022-09-21

## 2022-11-04 ENCOUNTER — OFFICE VISIT (OUTPATIENT)
Dept: CARDIOLOGY | Facility: CLINIC | Age: 82
End: 2022-11-04

## 2022-11-04 VITALS
BODY MASS INDEX: 26.92 KG/M2 | HEIGHT: 70 IN | SYSTOLIC BLOOD PRESSURE: 138 MMHG | HEART RATE: 77 BPM | WEIGHT: 188 LBS | OXYGEN SATURATION: 97 % | DIASTOLIC BLOOD PRESSURE: 70 MMHG

## 2022-11-04 DIAGNOSIS — I25.5 ISCHEMIC CARDIOMYOPATHY: ICD-10-CM

## 2022-11-04 DIAGNOSIS — Z95.810 CARDIAC RESYNCHRONIZATION THERAPY DEFIBRILLATOR (CRT-D) IN PLACE: Primary | ICD-10-CM

## 2022-11-04 PROCEDURE — 99214 OFFICE O/P EST MOD 30 MIN: CPT | Performed by: STUDENT IN AN ORGANIZED HEALTH CARE EDUCATION/TRAINING PROGRAM

## 2022-11-04 PROCEDURE — 93284 PRGRMG EVAL IMPLANTABLE DFB: CPT | Performed by: STUDENT IN AN ORGANIZED HEALTH CARE EDUCATION/TRAINING PROGRAM

## 2022-11-07 DIAGNOSIS — I25.5 ISCHEMIC CARDIOMYOPATHY: Primary | ICD-10-CM

## 2022-12-11 PROCEDURE — 93295 DEV INTERROG REMOTE 1/2/MLT: CPT | Performed by: INTERNAL MEDICINE

## 2022-12-11 PROCEDURE — 93296 REM INTERROG EVL PM/IDS: CPT | Performed by: INTERNAL MEDICINE

## 2023-01-12 ENCOUNTER — TELEPHONE (OUTPATIENT)
Dept: CARDIOLOGY | Facility: CLINIC | Age: 83
End: 2023-01-12
Payer: MEDICARE

## 2023-02-15 ENCOUNTER — OFFICE VISIT (OUTPATIENT)
Dept: ENDOCRINOLOGY | Facility: CLINIC | Age: 83
End: 2023-02-15
Payer: MEDICARE

## 2023-02-15 ENCOUNTER — SPECIALTY PHARMACY (OUTPATIENT)
Dept: PHARMACY | Facility: HOSPITAL | Age: 83
End: 2023-02-15
Payer: MEDICARE

## 2023-02-15 VITALS
HEART RATE: 78 BPM | BODY MASS INDEX: 26.56 KG/M2 | HEIGHT: 70 IN | DIASTOLIC BLOOD PRESSURE: 72 MMHG | OXYGEN SATURATION: 98 % | SYSTOLIC BLOOD PRESSURE: 122 MMHG | WEIGHT: 185.5 LBS

## 2023-02-15 DIAGNOSIS — Z79.4 TYPE 2 DIABETES MELLITUS WITH HYPOGLYCEMIA WITHOUT COMA, WITH LONG-TERM CURRENT USE OF INSULIN: Primary | ICD-10-CM

## 2023-02-15 DIAGNOSIS — E11.649 TYPE 2 DIABETES MELLITUS WITH HYPOGLYCEMIA WITHOUT COMA, WITH LONG-TERM CURRENT USE OF INSULIN: Primary | ICD-10-CM

## 2023-02-15 LAB
EXPIRATION DATE: NORMAL
GLUCOSE BLDC GLUCOMTR-MCNC: 156 MG/DL (ref 70–130)
HBA1C MFR BLD: 8.8 %
Lab: NORMAL

## 2023-02-15 PROCEDURE — 83036 HEMOGLOBIN GLYCOSYLATED A1C: CPT | Performed by: NURSE PRACTITIONER

## 2023-02-15 PROCEDURE — 82962 GLUCOSE BLOOD TEST: CPT | Performed by: NURSE PRACTITIONER

## 2023-02-15 PROCEDURE — 3052F HG A1C>EQUAL 8.0%<EQUAL 9.0%: CPT | Performed by: NURSE PRACTITIONER

## 2023-02-15 PROCEDURE — 99213 OFFICE O/P EST LOW 20 MIN: CPT | Performed by: NURSE PRACTITIONER

## 2023-02-15 RX ORDER — PROCHLORPERAZINE 25 MG/1
SUPPOSITORY RECTAL
Qty: 3 EACH | Refills: 11 | Status: SHIPPED | OUTPATIENT
Start: 2023-02-15

## 2023-02-15 RX ORDER — PROCHLORPERAZINE 25 MG/1
1 SUPPOSITORY RECTAL CONTINUOUS
Qty: 1 EACH | Refills: 0 | Status: SHIPPED | OUTPATIENT
Start: 2023-02-15

## 2023-02-15 RX ORDER — DULAGLUTIDE 0.75 MG/.5ML
0.75 INJECTION, SOLUTION SUBCUTANEOUS WEEKLY
Qty: 6 ML | Refills: 1 | Status: SHIPPED | OUTPATIENT
Start: 2023-02-15

## 2023-02-15 RX ORDER — GLUCAGON 1 MG
1 KIT INJECTION AS NEEDED
Qty: 1 EACH | Refills: 2 | Status: SHIPPED | OUTPATIENT
Start: 2023-02-15

## 2023-02-15 RX ORDER — PROCHLORPERAZINE 25 MG/1
1 SUPPOSITORY RECTAL
Qty: 1 EACH | Refills: 3 | Status: SHIPPED | OUTPATIENT
Start: 2023-02-15

## 2023-02-15 RX ORDER — INSULIN GLARGINE 100 [IU]/ML
INJECTION, SOLUTION SUBCUTANEOUS 2 TIMES DAILY
COMMUNITY

## 2023-03-12 PROCEDURE — 93295 DEV INTERROG REMOTE 1/2/MLT: CPT | Performed by: STUDENT IN AN ORGANIZED HEALTH CARE EDUCATION/TRAINING PROGRAM

## 2023-03-12 PROCEDURE — 93296 REM INTERROG EVL PM/IDS: CPT | Performed by: STUDENT IN AN ORGANIZED HEALTH CARE EDUCATION/TRAINING PROGRAM

## 2023-03-30 ENCOUNTER — OFFICE VISIT (OUTPATIENT)
Dept: FAMILY MEDICINE CLINIC | Facility: CLINIC | Age: 83
End: 2023-03-30
Payer: MEDICARE

## 2023-03-30 ENCOUNTER — EDUCATION (OUTPATIENT)
Dept: DIABETES SERVICES | Facility: HOSPITAL | Age: 83
End: 2023-03-30
Payer: MEDICARE

## 2023-03-30 VITALS
BODY MASS INDEX: 27.09 KG/M2 | WEIGHT: 189.2 LBS | SYSTOLIC BLOOD PRESSURE: 126 MMHG | HEART RATE: 77 BPM | HEIGHT: 70 IN | DIASTOLIC BLOOD PRESSURE: 74 MMHG | TEMPERATURE: 96.6 F | OXYGEN SATURATION: 98 %

## 2023-03-30 DIAGNOSIS — E11.43 TYPE 2 DIABETES MELLITUS WITH DIABETIC AUTONOMIC NEUROPATHY, WITH LONG-TERM CURRENT USE OF INSULIN: Primary | ICD-10-CM

## 2023-03-30 DIAGNOSIS — E78.5 HYPERLIPIDEMIA DUE TO TYPE 2 DIABETES MELLITUS: ICD-10-CM

## 2023-03-30 DIAGNOSIS — Z79.4 TYPE 2 DIABETES MELLITUS WITH DIABETIC AUTONOMIC NEUROPATHY, WITH LONG-TERM CURRENT USE OF INSULIN: Primary | ICD-10-CM

## 2023-03-30 DIAGNOSIS — E11.69 HYPERLIPIDEMIA DUE TO TYPE 2 DIABETES MELLITUS: ICD-10-CM

## 2023-03-30 DIAGNOSIS — I15.2 HYPERTENSION ASSOCIATED WITH DIABETES: ICD-10-CM

## 2023-03-30 DIAGNOSIS — E11.59 HYPERTENSION ASSOCIATED WITH DIABETES: ICD-10-CM

## 2023-03-30 PROCEDURE — 1160F RVW MEDS BY RX/DR IN RCRD: CPT | Performed by: FAMILY MEDICINE

## 2023-03-30 PROCEDURE — 99204 OFFICE O/P NEW MOD 45 MIN: CPT | Performed by: FAMILY MEDICINE

## 2023-03-30 PROCEDURE — 3078F DIAST BP <80 MM HG: CPT | Performed by: FAMILY MEDICINE

## 2023-03-30 PROCEDURE — 1159F MED LIST DOCD IN RCRD: CPT | Performed by: FAMILY MEDICINE

## 2023-03-30 PROCEDURE — 3074F SYST BP LT 130 MM HG: CPT | Performed by: FAMILY MEDICINE

## 2023-05-15 RX ORDER — DULAGLUTIDE 0.75 MG/.5ML
0.75 INJECTION, SOLUTION SUBCUTANEOUS WEEKLY
Qty: 6 ML | Refills: 1 | Status: SHIPPED | OUTPATIENT
Start: 2023-05-15

## 2023-06-16 ENCOUNTER — TELEPHONE (OUTPATIENT)
Dept: CARDIOLOGY | Facility: CLINIC | Age: 83
End: 2023-06-16
Payer: MEDICARE

## 2023-08-14 ENCOUNTER — TELEPHONE (OUTPATIENT)
Dept: CARDIOLOGY | Facility: CLINIC | Age: 83
End: 2023-08-14
Payer: MEDICARE

## 2023-08-17 ENCOUNTER — SPECIALTY PHARMACY (OUTPATIENT)
Dept: PHARMACY | Facility: HOSPITAL | Age: 83
End: 2023-08-17
Payer: MEDICARE

## 2023-08-17 ENCOUNTER — OFFICE VISIT (OUTPATIENT)
Dept: ENDOCRINOLOGY | Facility: CLINIC | Age: 83
End: 2023-08-17
Payer: MEDICARE

## 2023-08-17 VITALS
SYSTOLIC BLOOD PRESSURE: 124 MMHG | WEIGHT: 184.6 LBS | OXYGEN SATURATION: 97 % | HEART RATE: 66 BPM | HEIGHT: 70 IN | DIASTOLIC BLOOD PRESSURE: 72 MMHG | BODY MASS INDEX: 26.43 KG/M2

## 2023-08-17 DIAGNOSIS — E11.649 TYPE 2 DIABETES MELLITUS WITH HYPOGLYCEMIA WITHOUT COMA, WITH LONG-TERM CURRENT USE OF INSULIN: Primary | ICD-10-CM

## 2023-08-17 DIAGNOSIS — Z79.4 TYPE 2 DIABETES MELLITUS WITH HYPOGLYCEMIA WITHOUT COMA, WITH LONG-TERM CURRENT USE OF INSULIN: Primary | ICD-10-CM

## 2023-08-17 LAB
EXPIRATION DATE: NORMAL
GLUCOSE BLDC GLUCOMTR-MCNC: 265 MG/DL (ref 70–130)
HBA1C MFR BLD: 8.8 %
Lab: NORMAL

## 2023-08-17 PROCEDURE — 82043 UR ALBUMIN QUANTITATIVE: CPT | Performed by: NURSE PRACTITIONER

## 2023-08-17 PROCEDURE — 82570 ASSAY OF URINE CREATININE: CPT | Performed by: NURSE PRACTITIONER

## 2023-08-17 RX ORDER — ACYCLOVIR 400 MG/1
1 TABLET ORAL
COMMUNITY

## 2023-08-18 LAB
ALBUMIN UR-MCNC: 7.4 MG/DL
CREAT UR-MCNC: 156.4 MG/DL
MICROALBUMIN/CREAT UR: 47.3 MG/G

## 2023-08-28 RX ORDER — BICALUTAMIDE 50 MG/1
50 TABLET, FILM COATED ORAL EVERY MORNING
Qty: 90 TABLET | Refills: 0 | Status: SHIPPED | OUTPATIENT
Start: 2023-08-28

## 2023-09-10 PROCEDURE — 93295 DEV INTERROG REMOTE 1/2/MLT: CPT | Performed by: STUDENT IN AN ORGANIZED HEALTH CARE EDUCATION/TRAINING PROGRAM

## 2023-09-10 PROCEDURE — 93296 REM INTERROG EVL PM/IDS: CPT | Performed by: STUDENT IN AN ORGANIZED HEALTH CARE EDUCATION/TRAINING PROGRAM

## 2023-12-06 ENCOUNTER — OFFICE VISIT (OUTPATIENT)
Dept: ENDOCRINOLOGY | Facility: CLINIC | Age: 83
End: 2023-12-06
Payer: MEDICARE

## 2023-12-06 VITALS
OXYGEN SATURATION: 94 % | SYSTOLIC BLOOD PRESSURE: 128 MMHG | WEIGHT: 189 LBS | BODY MASS INDEX: 27.06 KG/M2 | HEART RATE: 70 BPM | HEIGHT: 70 IN | DIASTOLIC BLOOD PRESSURE: 60 MMHG

## 2023-12-06 DIAGNOSIS — Z79.4 TYPE 2 DIABETES MELLITUS WITH HYPOGLYCEMIA WITHOUT COMA, WITH LONG-TERM CURRENT USE OF INSULIN: Primary | ICD-10-CM

## 2023-12-06 DIAGNOSIS — E11.649 TYPE 2 DIABETES MELLITUS WITH HYPOGLYCEMIA WITHOUT COMA, WITH LONG-TERM CURRENT USE OF INSULIN: Primary | ICD-10-CM

## 2023-12-06 LAB
EXPIRATION DATE: ABNORMAL
GLUCOSE BLDC GLUCOMTR-MCNC: 176 MG/DL (ref 70–130)
HBA1C MFR BLD: 8.5 % (ref 4.5–5.7)
Lab: ABNORMAL

## 2023-12-06 RX ORDER — GLIMEPIRIDE 2 MG/1
2 TABLET ORAL 2 TIMES DAILY
Qty: 60 TABLET | Refills: 3 | Status: SHIPPED | OUTPATIENT
Start: 2023-12-06

## 2024-05-02 ENCOUNTER — TELEPHONE (OUTPATIENT)
Dept: ENDOCRINOLOGY | Facility: CLINIC | Age: 84
End: 2024-05-02

## 2024-05-02 RX ORDER — ACYCLOVIR 400 MG/1
1 TABLET ORAL
Qty: 3 EACH | Refills: 5 | Status: CANCELLED | OUTPATIENT
Start: 2024-05-02

## 2024-05-09 ENCOUNTER — SPECIALTY PHARMACY (OUTPATIENT)
Dept: PHARMACY | Facility: HOSPITAL | Age: 84
End: 2024-05-09
Payer: MEDICARE

## 2024-05-09 RX ORDER — ACYCLOVIR 400 MG/1
1 TABLET ORAL
Qty: 3 EACH | Refills: 11 | Status: CANCELLED | OUTPATIENT
Start: 2024-05-09

## 2024-05-09 RX ORDER — ACYCLOVIR 400 MG/1
1 TABLET ORAL
Qty: 3 EACH | Refills: 5 | Status: SHIPPED | OUTPATIENT
Start: 2024-05-09

## 2024-06-07 ENCOUNTER — OFFICE VISIT (OUTPATIENT)
Dept: CARDIOLOGY | Facility: CLINIC | Age: 84
End: 2024-06-07
Payer: MEDICARE

## 2024-06-07 VITALS
OXYGEN SATURATION: 95 % | DIASTOLIC BLOOD PRESSURE: 80 MMHG | HEART RATE: 75 BPM | BODY MASS INDEX: 26.11 KG/M2 | WEIGHT: 182.4 LBS | SYSTOLIC BLOOD PRESSURE: 134 MMHG | HEIGHT: 70 IN

## 2024-06-07 DIAGNOSIS — I25.5 ISCHEMIC CARDIOMYOPATHY: ICD-10-CM

## 2024-06-07 DIAGNOSIS — Z95.810 CARDIAC RESYNCHRONIZATION THERAPY DEFIBRILLATOR (CRT-D) IN PLACE: Primary | ICD-10-CM

## 2024-06-07 DIAGNOSIS — I44.7 LBBB (LEFT BUNDLE BRANCH BLOCK): ICD-10-CM

## 2024-07-18 ENCOUNTER — TELEPHONE (OUTPATIENT)
Dept: CARDIOLOGY | Facility: CLINIC | Age: 84
End: 2024-07-18
Payer: MEDICARE

## 2024-09-13 PROCEDURE — 93297 REM INTERROG DEV EVAL ICPMS: CPT | Performed by: STUDENT IN AN ORGANIZED HEALTH CARE EDUCATION/TRAINING PROGRAM

## 2024-11-19 ENCOUNTER — OFFICE VISIT (OUTPATIENT)
Dept: FAMILY MEDICINE CLINIC | Facility: CLINIC | Age: 84
End: 2024-11-19
Payer: MEDICARE

## 2024-11-19 ENCOUNTER — LAB (OUTPATIENT)
Dept: FAMILY MEDICINE CLINIC | Facility: CLINIC | Age: 84
End: 2024-11-19
Payer: MEDICARE

## 2024-11-19 VITALS
OXYGEN SATURATION: 96 % | BODY MASS INDEX: 27.19 KG/M2 | HEART RATE: 60 BPM | SYSTOLIC BLOOD PRESSURE: 124 MMHG | DIASTOLIC BLOOD PRESSURE: 68 MMHG | HEIGHT: 70 IN | TEMPERATURE: 97.9 F | WEIGHT: 189.9 LBS

## 2024-11-19 DIAGNOSIS — I25.10 ASCVD (ARTERIOSCLEROTIC CARDIOVASCULAR DISEASE): ICD-10-CM

## 2024-11-19 DIAGNOSIS — Z79.4 TYPE 2 DIABETES MELLITUS WITHOUT COMPLICATION, WITH LONG-TERM CURRENT USE OF INSULIN: Primary | ICD-10-CM

## 2024-11-19 DIAGNOSIS — I10 ESSENTIAL HYPERTENSION: ICD-10-CM

## 2024-11-19 DIAGNOSIS — C61 PROSTATE CANCER: ICD-10-CM

## 2024-11-19 DIAGNOSIS — E11.641 TYPE 2 DIABETES MELLITUS WITH HYPOGLYCEMIA AND COMA, WITH LONG-TERM CURRENT USE OF INSULIN: Primary | ICD-10-CM

## 2024-11-19 DIAGNOSIS — E11.9 TYPE 2 DIABETES MELLITUS WITHOUT COMPLICATION, WITH LONG-TERM CURRENT USE OF INSULIN: Primary | ICD-10-CM

## 2024-11-19 DIAGNOSIS — M54.42 CHRONIC BILATERAL LOW BACK PAIN WITH LEFT-SIDED SCIATICA: ICD-10-CM

## 2024-11-19 DIAGNOSIS — Z79.4 TYPE 2 DIABETES MELLITUS WITH HYPOGLYCEMIA AND COMA, WITH LONG-TERM CURRENT USE OF INSULIN: Primary | ICD-10-CM

## 2024-11-19 DIAGNOSIS — G89.29 CHRONIC BILATERAL LOW BACK PAIN WITH LEFT-SIDED SCIATICA: ICD-10-CM

## 2024-11-19 PROCEDURE — 99214 OFFICE O/P EST MOD 30 MIN: CPT | Performed by: STUDENT IN AN ORGANIZED HEALTH CARE EDUCATION/TRAINING PROGRAM

## 2024-11-19 PROCEDURE — 1126F AMNT PAIN NOTED NONE PRSNT: CPT | Performed by: STUDENT IN AN ORGANIZED HEALTH CARE EDUCATION/TRAINING PROGRAM

## 2024-11-19 PROCEDURE — 1170F FXNL STATUS ASSESSED: CPT | Performed by: STUDENT IN AN ORGANIZED HEALTH CARE EDUCATION/TRAINING PROGRAM

## 2024-11-19 PROCEDURE — G0439 PPPS, SUBSEQ VISIT: HCPCS | Performed by: STUDENT IN AN ORGANIZED HEALTH CARE EDUCATION/TRAINING PROGRAM

## 2024-11-19 PROCEDURE — 96160 PT-FOCUSED HLTH RISK ASSMT: CPT | Performed by: STUDENT IN AN ORGANIZED HEALTH CARE EDUCATION/TRAINING PROGRAM

## 2024-11-19 PROCEDURE — 3074F SYST BP LT 130 MM HG: CPT | Performed by: STUDENT IN AN ORGANIZED HEALTH CARE EDUCATION/TRAINING PROGRAM

## 2024-11-19 PROCEDURE — 3078F DIAST BP <80 MM HG: CPT | Performed by: STUDENT IN AN ORGANIZED HEALTH CARE EDUCATION/TRAINING PROGRAM

## 2024-11-19 NOTE — ASSESSMENT & PLAN NOTE
1. Type II diabetes, goal A1c of 8.0. On lantus 45U hs, jardiance, and amaryl. I will consider discontinuing amaryl and switching to GLP-1 based on A1c results as well as increase jardiance. He is on lisinopril for nephroprotection, no nephropathy at last check. On lipitor for ASCVD prevention.   2. CAD with goal LDL <70, on asa and lipitor 40, check LDL. Continue metoprolol.  3. Continue lisinopril, BP at goal, recommend home monitoring.   4. Continue casodex, needs yearly PSA.   5.  Patient has been on tramadol 50mg HS for many years, he reports this improves his pain and allows him to be more mobile as well as get adequate rest. He is not interested in stopping it at this time. I have discussed with him risks benefits and side effects. Will continue this medication. UDS obtained, Willi reviewed, controlled drug agreement signed.

## 2024-11-19 NOTE — PROGRESS NOTES
Subjective   The ABCs of the Annual Wellness Visit  Medicare Wellness Visit      Teddy De Leon is a 84 y.o. patient who presents for a Medicare Wellness Visit.    The following portions of the patient's history were reviewed and   updated as appropriate: allergies, current medications, past family history, past medical history, past social history, past surgical history, and problem list.    Compared to one year ago, the patient's physical   health is worse.  Compared to one year ago, the patient's mental   health is the same.    Recent Hospitalizations:  He was not admitted to the hospital during the last year.     Current Medical Providers:  Patient Care Team:  Roman Lebron DO as PCP - General (Family Medicine)  Rene Palencia MD as Consulting Physician (Cardiology)  Nas Quevedo DO as Consulting Physician (Cardiology)  Clayton Dickinson MD as Consulting Physician (Cardiology)  Jimbo Bentley MD as Consulting Physician (Cardiology)    Outpatient Medications Prior to Visit   Medication Sig Dispense Refill    aspirin 81 MG EC tablet Take 1 tablet by mouth Daily. 30 tablet 5    atorvastatin (LIPITOR) 40 MG tablet Take 1 tablet by mouth Every Night.      bicalutamide (CASODEX) 50 MG tablet Take 1 tablet by mouth Every Morning. 90 tablet 0    carvedilol (COREG) 12.5 MG tablet Take 1 tablet by mouth 2 (Two) Times a Day. (Patient taking differently: Take 1 tablet by mouth Daily.) 180 tablet 3    Continuous Blood Gluc Sensor (Dexcom G7 Sensor) misc 1 each Every 10 (Ten) Days.      empagliflozin (Jardiance) 10 MG tablet tablet Take 1 tablet by mouth Daily. 30 tablet 3    glimepiride (AMARYL) 2 MG tablet Take 1 tablet by mouth 2 (Two) Times a Day. 60 tablet 3    Glucagon HCl (Glucagon Emergency) 1 MG/ML reconstituted solution Inject 1 mg as directed As Needed (for severe low blood sugar). 1 each 2    Insulin Glargine (Lantus SoloStar) 100 UNIT/ML injection pen Inject 20-45 Units under the skin into the  appropriate area as directed Daily. 20-25 units twice daily      lisinopril (PRINIVIL,ZESTRIL) 10 MG tablet Take 1 tablet by mouth Daily. 90 tablet 3    metFORMIN (GLUCOPHAGE) 500 MG tablet Take 2 tablets by mouth 2 (Two) Times a Day.      omeprazole (PriLOSEC) 20 MG capsule Take 1 capsule by mouth Every Morning.      tamsulosin (FLOMAX) 0.4 MG capsule 24 hr capsule Take 1 capsule by mouth Every Night.      traMADol (ULTRAM) 50 MG tablet Take 1 tablet by mouth 4 (Four) Times a Day As Needed for Moderate Pain.      Continuous Glucose Sensor (Dexcom G7 Sensor) misc Use 1 each Every 10 (Ten) Days. (Patient not taking: Reported on 11/19/2024) 3 each 5     No facility-administered medications prior to visit.     Opioid medication/s are on active medication list.  and I have evaluated his active treatment plan and pain score trends (see table).  Vitals:    11/19/24 1015   PainSc: 0-No pain     I have reviewed the chart for potential of high risk medication and harmful drug interactions in the elderly.        Aspirin is on active medication list. Aspirin use is indicated based on review of current medical condition/s. Pros and cons of this therapy have been discussed today. Benefits of this medication outweigh potential harm.  Patient has been encouraged to continue taking this medication.  .      Patient Active Problem List   Diagnosis    Pneumonia of left lower lobe due to infectious organism    ASCVD (arteriosclerotic cardiovascular disease) with previous myocardial infarctions in 1994, status post stenting.    Ischemic cardiomyopathy, with LV ejection fraction of about 25% on recent echo Doppler study in December 2017, compensated.    Type 2 diabetes mellitus with hypoglycemia, with long-term current use of insulin    Mixed hyperlipidemia    Essential hypertension    Back pain    Arrhythmia    Prostate cancer, status post radiation therapy and is currently on long-term chemotherapy.    Iron deficiency anemia    Sternal  "fracture    LBBB (left bundle branch block)    Cardiac resynchronization therapy defibrillator (CRT-D) in place    Chronic systolic congestive heart failure     Advance Care Planning Advance Directive is not on file.  ACP discussion was held with the patient during this visit. Patient does not have an advance directive, information provided.            Objective   Vitals:    24 1015   BP: 124/68   BP Location: Right arm   Patient Position: Sitting   Pulse: 60   Temp: 97.9 °F (36.6 °C)   TempSrc: Temporal   SpO2: 96%   Weight: 86.1 kg (189 lb 14.4 oz)   Height: 177.8 cm (70\")   PainSc: 0-No pain       Estimated body mass index is 27.25 kg/m² as calculated from the following:    Height as of this encounter: 177.8 cm (70\").    Weight as of this encounter: 86.1 kg (189 lb 14.4 oz).    BMI is >= 25 and <30. (Overweight) The following options were offered after discussion;: weight loss educational material (shared in after visit summary)       Does the patient have evidence of cognitive impairment? No                                                                                                Health  Risk Assessment    Smoking Status:  Social History     Tobacco Use   Smoking Status Former    Current packs/day: 0.00    Average packs/day: 2.0 packs/day for 19.0 years (38.0 ttl pk-yrs)    Types: Cigarettes    Start date:     Quit date:     Years since quittin.9   Smokeless Tobacco Former    Types: Chew    Quit date:      Alcohol Consumption:  Social History     Substance and Sexual Activity   Alcohol Use No       Fall Risk Screen  STEADI Fall Risk Assessment was completed, and patient is at LOW risk for falls.Assessment completed on:2024    Depression Screening   Little interest or pleasure in doing things? Not at all   Feeling down, depressed, or hopeless? Not at all   PHQ-2 Total Score 0      Health Habits and Functional and Cognitive Screenin/19/2024    10:14 AM   Functional & " Cognitive Status   Do you have difficulty preparing food and eating? No   Do you have difficulty bathing yourself, getting dressed or grooming yourself? No   Do you have difficulty using the toilet? No   Do you have difficulty moving around from place to place? No   Do you have trouble with steps or getting out of a bed or a chair? No   Current Diet Well Balanced Diet   Dental Exam Up to date   Eye Exam Up to date   Exercise (times per week) 2 times per week   Current Exercises Include Walking   Do you need help using the phone?  No   Are you deaf or do you have serious difficulty hearing?  No   Do you need help to go to places out of walking distance? No   Do you need help shopping? No   Do you need help preparing meals?  No   Do you need help with housework?  No   Do you need help with laundry? No   Do you need help taking your medications? No   Do you need help managing money? No   Do you ever drive or ride in a car without wearing a seat belt? No   Have you felt unusual stress, anger or loneliness in the last month? No   Who do you live with? Spouse   If you need help, do you have trouble finding someone available to you? No   Have you been bothered in the last four weeks by sexual problems? No   Do you have difficulty concentrating, remembering or making decisions? No           Age-appropriate Screening Schedule:  Refer to the list below for future screening recommendations based on patient's age, sex and/or medical conditions. Orders for these recommended tests are listed in the plan section. The patient has been provided with a written plan.    Health Maintenance List  Health Maintenance   Topic Date Due    COLORECTAL CANCER SCREENING  Never done    TDAP/TD VACCINES (1 - Tdap) Never done    RSV Vaccine - Adults (1 - 1-dose 75+ series) Never done    ANNUAL WELLNESS VISIT  Never done    DIABETIC EYE EXAM  09/30/2022    LIPID PANEL  08/19/2023    BMI FOLLOWUP  03/30/2024    HEMOGLOBIN A1C  06/06/2024    COVID-19  "Vaccine (4 - 2024-25 season) 09/01/2024    INFLUENZA VACCINE  03/31/2025 (Originally 8/1/2024)    Pneumococcal Vaccine 65+  Completed    ZOSTER VACCINE  Completed    URINE MICROALBUMIN  Discontinued                                                                                                                                                CMS Preventative Services Quick Reference  Risk Factors Identified During Encounter  None Identified    The above risks/problems have been discussed with the patient.  Pertinent information has been shared with the patient in the After Visit Summary.  An After Visit Summary and PPPS were made available to the patient.    Follow Up:   Next Medicare Wellness visit to be scheduled in 1 year.         Additional E&M Note during same encounter follows:  Patient has additional, significant, and separately identifiable condition(s)/problem(s) that require work above and beyond the Medicare Wellness Visit     Chief Complaint  Medicare Wellness-subsequent    Subjective   HPI  Teddy is also being seen today for additional medical problem/s, of type II diabetes, CAD, and hypertension. He was previously following with endocrinology but has been lost to follow up. He reports that his last A1c was 8.5. He takes amaryl, jardiance, and 45U of lantus. He does have a dexcom G7 sensor. He is on casodex for prostate cancer. He denies any current complaints.                 Objective   Vital Signs:  /68 (BP Location: Right arm, Patient Position: Sitting)   Pulse 60   Temp 97.9 °F (36.6 °C) (Temporal)   Ht 177.8 cm (70\")   Wt 86.1 kg (189 lb 14.4 oz)   SpO2 96%   BMI 27.25 kg/m²   Physical Exam  Constitutional:       Appearance: Normal appearance.   HENT:      Mouth/Throat:      Mouth: Mucous membranes are moist.   Cardiovascular:      Rate and Rhythm: Normal rate and regular rhythm.   Pulmonary:      Effort: Pulmonary effort is normal.      Breath sounds: Normal breath sounds. No wheezing " or rhonchi.   Musculoskeletal:         General: Normal range of motion.   Skin:     General: Skin is warm and dry.   Neurological:      Mental Status: He is alert.   Psychiatric:         Mood and Affect: Mood normal.                       Assessment and Plan            Type 2 diabetes mellitus without complication, with long-term current use of insulin           ASCVD (arteriosclerotic cardiovascular disease) with previous myocardial infarctions in 1994, status post stenting.         Essential hypertension           Prostate cancer, status post radiation therapy and is currently on long-term chemotherapy.      Chronic bilateral low back pain with left-sided sciatica       1. Type II diabetes, goal A1c of 8.0. On lantus 45U hs, jardiance, and amaryl. I will consider discontinuing amaryl and switching to GLP-1 based on A1c results as well as increase jardiance. He is on lisinopril for nephroprotection, no nephropathy at last check. On lipitor for ASCVD prevention.   2. CAD with goal LDL <70, on asa and lipitor 40, check LDL. Continue metoprolol.  3. Continue lisinopril, BP at goal, recommend home monitoring.   4. Continue casodex, needs yearly PSA.   5.  Patient has been on tramadol 50mg HS for many years, he reports this improves his pain and allows him to be more mobile as well as get adequate rest. He is not interested in stopping it at this time. I have discussed with him risks benefits and side effects. Will continue this medication. UDS obtained, Willi reviewed, controlled drug agreement signed.             Follow Up   Return in about 3 months (around 2/19/2025), or Needs labs.  Patient was given instructions and counseling regarding his condition or for health maintenance advice. Please see specific information pulled into the AVS if appropriate.

## 2024-11-20 ENCOUNTER — TELEPHONE (OUTPATIENT)
Dept: FAMILY MEDICINE CLINIC | Facility: CLINIC | Age: 84
End: 2024-11-20
Payer: MEDICARE

## 2024-11-20 LAB
ALBUMIN SERPL-MCNC: 4.2 G/DL (ref 3.5–5.2)
ALBUMIN/GLOB SERPL: 1.8 G/DL
ALP SERPL-CCNC: 60 U/L (ref 39–117)
ALT SERPL-CCNC: 14 U/L (ref 1–41)
AST SERPL-CCNC: 15 U/L (ref 1–40)
BILIRUB SERPL-MCNC: 0.4 MG/DL (ref 0–1.2)
BUN SERPL-MCNC: 17 MG/DL (ref 8–23)
BUN/CREAT SERPL: 15.6 (ref 7–25)
CALCIUM SERPL-MCNC: 9.5 MG/DL (ref 8.6–10.5)
CHLORIDE SERPL-SCNC: 100 MMOL/L (ref 98–107)
CHOLEST SERPL-MCNC: 172 MG/DL (ref 0–200)
CO2 SERPL-SCNC: 27.2 MMOL/L (ref 22–29)
CREAT SERPL-MCNC: 1.09 MG/DL (ref 0.76–1.27)
EGFRCR SERPLBLD CKD-EPI 2021: 67.8 ML/MIN/1.73
ERYTHROCYTE [DISTWIDTH] IN BLOOD BY AUTOMATED COUNT: 13.1 % (ref 12.3–15.4)
GLOBULIN SER CALC-MCNC: 2.3 GM/DL
GLUCOSE SERPL-MCNC: 278 MG/DL (ref 65–99)
HBA1C MFR BLD: 9.5 % (ref 4.8–5.6)
HCT VFR BLD AUTO: 36.8 % (ref 37.5–51)
HDLC SERPL-MCNC: 37 MG/DL (ref 40–60)
HGB BLD-MCNC: 11.8 G/DL (ref 13–17.7)
IMP & REVIEW OF LAB RESULTS: NORMAL
LDLC SERPL CALC-MCNC: 112 MG/DL (ref 0–100)
MCH RBC QN AUTO: 30 PG (ref 26.6–33)
MCHC RBC AUTO-ENTMCNC: 32.1 G/DL (ref 31.5–35.7)
MCV RBC AUTO: 93.6 FL (ref 79–97)
PLATELET # BLD AUTO: 122 10*3/MM3 (ref 140–450)
POTASSIUM SERPL-SCNC: 5.5 MMOL/L (ref 3.5–5.2)
PROT SERPL-MCNC: 6.5 G/DL (ref 6–8.5)
RBC # BLD AUTO: 3.93 10*6/MM3 (ref 4.14–5.8)
SODIUM SERPL-SCNC: 137 MMOL/L (ref 136–145)
TRIGL SERPL-MCNC: 127 MG/DL (ref 0–150)
VLDLC SERPL CALC-MCNC: 23 MG/DL (ref 5–40)
WBC # BLD AUTO: 6.24 10*3/MM3 (ref 3.4–10.8)

## 2024-11-20 NOTE — TELEPHONE ENCOUNTER
----- Message from Roman Lebron sent at 11/20/2024  1:29 PM EST -----  Please let the patient know that his glucose is much higher than suspected.  A1c is 9.5, he also has a mild anemia and borderline high potassium.  No changes to current regimen right now but I would like to see him earlier than his scheduled appointment.  His wife has an appointment in about 4 weeks he should come with her, and who recalls put him on the schedule with her.  Miri De Leon is his wifes name

## 2024-12-10 ENCOUNTER — TELEPHONE (OUTPATIENT)
Dept: FAMILY MEDICINE CLINIC | Facility: CLINIC | Age: 84
End: 2024-12-10
Payer: MEDICARE

## 2024-12-10 DIAGNOSIS — K21.9 GASTROESOPHAGEAL REFLUX DISEASE, UNSPECIFIED WHETHER ESOPHAGITIS PRESENT: ICD-10-CM

## 2024-12-10 DIAGNOSIS — C61 PROSTATE CANCER: Primary | ICD-10-CM

## 2024-12-10 RX ORDER — BICALUTAMIDE 50 MG/1
50 TABLET, FILM COATED ORAL EVERY MORNING
Qty: 90 TABLET | Refills: 1 | Status: SHIPPED | OUTPATIENT
Start: 2024-12-10

## 2024-12-10 NOTE — TELEPHONE ENCOUNTER
PT NEEDS BICALUTAMIDE 50 MG, AND OMEPRAZOLE 20 [employer]GM REFILLED AND SENT TO [facility] IN [facility].

## 2024-12-17 ENCOUNTER — OFFICE VISIT (OUTPATIENT)
Dept: FAMILY MEDICINE CLINIC | Facility: CLINIC | Age: 84
End: 2024-12-17
Payer: MEDICARE

## 2024-12-17 VITALS
OXYGEN SATURATION: 96 % | SYSTOLIC BLOOD PRESSURE: 122 MMHG | HEART RATE: 64 BPM | TEMPERATURE: 96.4 F | DIASTOLIC BLOOD PRESSURE: 64 MMHG | HEIGHT: 70 IN | BODY MASS INDEX: 26.14 KG/M2 | WEIGHT: 182.6 LBS

## 2024-12-17 DIAGNOSIS — E11.9 TYPE 2 DIABETES MELLITUS WITHOUT COMPLICATION, WITH LONG-TERM CURRENT USE OF INSULIN: Primary | ICD-10-CM

## 2024-12-17 DIAGNOSIS — Z79.4 TYPE 2 DIABETES MELLITUS WITHOUT COMPLICATION, WITH LONG-TERM CURRENT USE OF INSULIN: Primary | ICD-10-CM

## 2024-12-17 RX ORDER — ATORVASTATIN CALCIUM 40 MG/1
40 TABLET, FILM COATED ORAL NIGHTLY
Qty: 90 TABLET | Status: CANCELLED | OUTPATIENT
Start: 2024-12-17

## 2024-12-17 NOTE — PROGRESS NOTES
Chief Complaint  Lab results    HPI:   HPI   Teddy De Leon is a 84 y.o. male who presents today to [facility] for Lab results.  Patient seen in follow-up for uncontrolled type 2 diabetes.  Patient is currently on metformin 1000 mg twice daily as well as Amaryl 2 mg twice daily.  His last A1c was 9.4.  He has no history of pancreatitis, no history of gallstones.  Jardiance has been unaffordable for him in the past.    Previous History:   Past Medical History:   Diagnosis Date    Arthritis     sites varies     Coronary artery disease     stent inserted by Dr Palencia     Diabetes mellitus     insulin and po meds     GERD (gastroesophageal reflux disease)     Hyperlipidemia     Injury of back     Iron deficiency anemia 10/31/2016    Myocardial infarction     Prostate cancer     radiation treatment -no surgery     Wears glasses       Past Surgical History:   Procedure Laterality Date    CARDIAC CATHETERIZATION      CARDIAC ELECTROPHYSIOLOGY PROCEDURE N/A 2018    Procedure: Implant ICD - bi ventricular;  Surgeon: Nas Quevedo DO;  Location: [facility] INVASIVE LOCATION;  Service: Cardiology    CATARACT EXTRACTION, BILATERAL      2024 and May 2024    COLONOSCOPY      CORONARY STENT PLACEMENT      FRACTURE SURGERY Right     rt arm repair     SKIN BIOPSY      nose -small area      Social History     Socioeconomic History    Marital status:    Tobacco Use    Smoking status: Former     Current packs/day: 0.00     Average packs/day: 2.0 packs/day for 19.0 years (38.0 ttl pk-yrs)     Types: Cigarettes     Start date:      Quit date:      Years since quittin.9    Smokeless tobacco: Former     Types: Chew     Quit date:    Vaping Use    Vaping status: Never Used   Substance and Sexual Activity    Alcohol use: No    Drug use: No    Sexual activity: Defer      Health Maintenance Due   Topic Date Due    COLORECTAL CANCER SCREENING  Never done     "TDAP/TD VACCINES (1 - Tdap) Never done    RSV Vaccine - Adults (1 - 1-dose 75+ series) Never done    DIABETIC EYE EXAM  09/30/2022    COVID-19 Vaccine (4 - 2024-25 season) 09/01/2024        Current Medications:  Current Outpatient Medications   Medication Sig Dispense Refill    aspirin 81 MG EC tablet Take 1 tablet by mouth Daily. 30 tablet 5    atorvastatin (LIPITOR) 40 MG tablet Take 1 tablet by mouth Every Night.      bicalutamide (CASODEX) 50 MG tablet Take 1 tablet by mouth Every Morning. 90 tablet 1    carvedilol (COREG) 12.5 MG tablet Take 1 tablet by mouth 2 (Two) Times a Day. (Patient taking differently: Take 1 tablet by mouth Daily.) 180 tablet 3    Continuous Blood Gluc Sensor (Dexcom G7 Sensor) misc 1 each Every 10 (Ten) Days.      glimepiride (AMARYL) 2 MG tablet Take 1 tablet by mouth 2 (Two) Times a Day. 60 tablet 3    Glucagon HCl (Glucagon Emergency) 1 MG/ML reconstituted solution Inject 1 mg as directed As Needed (for severe low blood sugar). 1 each 2    Insulin Glargine (Lantus SoloStar) 100 UNIT/ML injection pen Inject 20-45 Units under the skin into the appropriate area as directed Daily. 20-25 units twice daily      lisinopril (PRINIVIL,ZESTRIL) 10 MG tablet Take 1 tablet by mouth Daily. 90 tablet 3    metFORMIN (GLUCOPHAGE) 500 MG tablet Take 2 tablets by mouth 2 (Two) Times a Day.      omeprazole (priLOSEC) 20 MG capsule Take 1 capsule by mouth Every Morning. 90 capsule 1    tamsulosin (FLOMAX) 0.4 MG capsule 24 hr capsule Take 1 capsule by mouth Every Night.      traMADol (ULTRAM) 50 MG tablet Take 1 tablet by mouth 4 (Four) Times a Day As Needed for Moderate Pain.       No current facility-administered medications for this visit.       Allergies:   No Known Allergies    Vitals:   /64 (BP Location: Right arm, Patient Position: Sitting, Cuff Size: Adult)   Pulse 64   Temp 96.4 °F (35.8 °C) (Temporal)   Ht 177.8 cm (70\")   Wt 82.8 kg (182 lb 9.6 oz)   SpO2 96%   BMI 26.20 kg/m² " "    Estimated body mass index is 26.2 kg/m² as calculated from the following:    Height as of this encounter: 177.8 cm (70\").    Weight as of this encounter: 82.8 kg (182 lb 9.6 oz).    Teddy De Leon  reports that he quit smoking about 45 years ago. His smoking use included cigarettes. He started smoking about 65 years ago. He has a 38 pack-year smoking history. He quit smokeless tobacco use about 44 years ago.  His smokeless tobacco use included chew.            Physical Exam:   Physical Exam  Constitutional:       Appearance: Normal appearance.   HENT:      Mouth/Throat:      Mouth: Mucous membranes are moist.   Cardiovascular:      Rate and Rhythm: Normal rate and regular rhythm.   Pulmonary:      Effort: Pulmonary effort is normal.      Breath sounds: Normal breath sounds. No wheezing or rhonchi.   Musculoskeletal:         General: Normal range of motion.   Skin:     General: Skin is warm and dry.   Neurological:      Mental Status: He is alert.   Psychiatric:         Mood and Affect: Mood normal.          Lab Results:   Lab on 11/19/2024   Component Date Value Ref Range Status    Glucose 11/19/2024 278 (H)  65 - 99 mg/dL Final    BUN 11/19/2024 17  8 - 23 mg/dL Final    Creatinine 11/19/2024 1.09  0.76 - 1.27 mg/dL Final    EGFR Result 11/19/2024 67.8  >60.0 mL/min/1.73 Final    Comment: GFR Normal >60  Chronic Kidney Disease <60  Kidney Failure <15  The GFR formula is only valid for adults with stable renal  function between ages 18 and 70.      BUN/Creatinine Ratio 11/19/2024 15.6  7.0 - 25.0 Final    Sodium 11/19/2024 137  136 - 145 mmol/L Final    Potassium 11/19/2024 5.5 (H)  3.5 - 5.2 mmol/L Final    Chloride 11/19/2024 100  98 - 107 mmol/L Final    Total CO2 11/19/2024 27.2  22.0 - 29.0 mmol/L Final    Calcium 11/19/2024 9.5  8.6 - 10.5 mg/dL Final    Total Protein 11/19/2024 6.5  6.0 - 8.5 g/dL Final    Albumin 11/19/2024 4.2  3.5 - 5.2 g/dL Final    Globulin 11/19/2024 2.3  gm/dL Final    A/G Ratio " 11/19/2024 1.8  g/dL Final    Total Bilirubin 11/19/2024 0.4  0.0 - 1.2 mg/dL Final    Alkaline Phosphatase 11/19/2024 60  39 - 117 U/L Final    AST (SGOT) 11/19/2024 15  1 - 40 U/L Final    ALT (SGPT) 11/19/2024 14  1 - 41 U/L Final    WBC 11/19/2024 6.24  3.40 - 10.80 10*3/mm3 Final    Comment: **Effective December 2, 2024 profile [id number] WBC will be made**    non-orderable as a stand-alone order code.      RBC 11/19/2024 3.93 (L)  4.14 - 5.80 10*6/mm3 Final    Hemoglobin 11/19/2024 11.8 (L)  13.0 - 17.7 g/dL Final    Hematocrit 11/19/2024 36.8 (L)  37.5 - 51.0 % Final    MCV 11/19/2024 93.6  79.0 - 97.0 fL Final    MCH 11/19/2024 30.0  26.6 - 33.0 pg Final    MCHC 11/19/2024 32.1  31.5 - 35.7 g/dL Final    RDW 11/19/2024 13.1  12.3 - 15.4 % Final    Platelets 11/19/2024 122 (L)  140 - 450 10*3/mm3 Final    Hemoglobin A1C 11/19/2024 9.50 (H)  4.80 - 5.60 % Final    Comment: Hemoglobin A1C Ranges:  Increased Risk for Diabetes  5.7% to 6.4%  Diabetes                     >= 6.5%  Diabetic Goal                < 7.0%      Total Cholesterol 11/19/2024 172  0 - 200 mg/dL Final    Comment: Cholesterol Reference Ranges  (U.S. Department of Health and Human Services ATP III  Classifications)  Desirable          <200 mg/dL  Borderline High    200-239 mg/dL  High Risk          >240 mg/dL  Triglyceride Reference Ranges  (U.S. Department of Health and Human Services ATP III  Classifications)  Normal           <150 mg/dL  Borderline High  150-199 mg/dL  High             200-499 mg/dL  Very High        >500 mg/dL  HDL Reference Ranges  (U.S. Department of Health and Human Services ATP III  Classifications)  Low     <40 mg/dl (major risk factor for CHD)  High    >60 mg/dl ('negative' risk factor for CHD)  LDL Reference Ranges  (U.S. Department of Health and Human Services ATP III  Classifications)  Optimal          <100 mg/dL  Near Optimal     100-129 mg/dL  Borderline High  130-159 mg/dL  High             160-189 mg/dL  Very High         >189 mg/dL      Triglycerides 11/19/2024 127  0 - 150 mg/dL Final    HDL Cholesterol 11/19/2024 37 (L)  40 - 60 mg/dL Final    VLDL Cholesterol [place] 11/19/2024 23  5 - 40 mg/dL Final    LDL Chol Calc (NIH) 11/19/2024 112 (H)  0 - 100 mg/dL Final    Interpretation 11/19/2024 Note   Final    Supplemental report is available.       Assessment and Plan  Diagnoses and all orders for this visit:    1. Type 2 diabetes mellitus without complication, with long-term current use of insulin (Primary)    Uncontrolled type 2 diabetes, on metformin 1000 mg twice daily, Amaryl, and Tresiba 45 units daily.  Patient would benefit from GLP-1 agonist, no contraindications, no gallstones, no pancreatitis, no history of medullary thyroid cancer.  Affordability is a problem.  However likely meets criteria for patient assistance program.  Will start Ozempic 0.25 mg once weekly, apply for patient assistance program and titrate as necessary.  Follow-up in 2 months, recheck A1c.            New Medications:   No orders of the defined types were placed in this encounter.      Discontinued Medications:   Medications Discontinued During This Encounter   Medication Reason    empagliflozin (Jardiance) 10 MG tablet tablet *Therapy completed                 Follow Up:   No follow-ups on file.    Patient was given instructions and counseling regarding his condition or for health maintenance advice. Please see specific information pulled into the AVS if appropriate.       This document has been electronically signed by Roman Lebron DO   December 17, 2024 11:43 EST    Dictated Utilizing Dragon Dictation: Part of this note may be an electronic transcription/translation of spoken language to printed text using the Dragon Dictation System.

## 2024-12-18 ENCOUNTER — APPOINTMENT (OUTPATIENT)
Dept: CT IMAGING | Facility: HOSPITAL | Age: 84
End: 2024-12-18
Payer: MEDICARE

## 2024-12-18 ENCOUNTER — HOSPITAL ENCOUNTER (OUTPATIENT)
Facility: HOSPITAL | Age: 84
LOS: 1 days | Discharge: HOME | End: 2024-12-19
Attending: STUDENT IN AN ORGANIZED HEALTH CARE EDUCATION/TRAINING PROGRAM | Admitting: INTERNAL MEDICINE
Payer: MEDICARE

## 2024-12-18 ENCOUNTER — APPOINTMENT (OUTPATIENT)
Dept: GENERAL RADIOLOGY | Facility: HOSPITAL | Age: 84
End: 2024-12-18
Payer: MEDICARE

## 2024-12-18 DIAGNOSIS — G45.9 TIA (TRANSIENT ISCHEMIC ATTACK): ICD-10-CM

## 2024-12-18 DIAGNOSIS — R41.82 ALTERED MENTAL STATUS, UNSPECIFIED ALTERED MENTAL STATUS TYPE: Primary | ICD-10-CM

## 2024-12-18 PROBLEM — G93.40 ENCEPHALOPATHY ACUTE: Status: ACTIVE | Noted: 2024-12-18

## 2024-12-18 LAB
A-A DO2: 35.1 MMHG (ref 0–300)
ABO GROUP BLD: NORMAL
ALBUMIN SERPL-MCNC: 4.2 G/DL (ref 3.5–5.2)
ALBUMIN/GLOB SERPL: 1.8 G/DL
ALP SERPL-CCNC: 65 U/L (ref 39–117)
ALT SERPL W P-5'-P-CCNC: 9 U/L (ref 1–41)
AMPHET+METHAMPHET UR QL: NEGATIVE
AMPHETAMINES UR QL: NEGATIVE
ANION GAP SERPL CALCULATED.3IONS-SCNC: 11.5 MMOL/L (ref 5–15)
APTT PPP: 24.2 SECONDS (ref 26.5–34.5)
ARTERIAL PATENCY WRIST A: POSITIVE
AST SERPL-CCNC: 11 U/L (ref 1–40)
ATMOSPHERIC PRESS: 727 MMHG
BARBITURATES UR QL SCN: NEGATIVE
BASE EXCESS BLDA CALC-SCNC: 2.6 MMOL/L (ref 0–2)
BASOPHILS # BLD AUTO: 0.02 10*3/MM3 (ref 0–0.2)
BASOPHILS NFR BLD AUTO: 0.4 % (ref 0–1.5)
BDY SITE: ABNORMAL
BENZODIAZ UR QL SCN: NEGATIVE
BILIRUB SERPL-MCNC: 0.7 MG/DL (ref 0–1.2)
BILIRUB UR QL STRIP: NEGATIVE
BLD GP AB SCN SERPL QL: NEGATIVE
BUN SERPL-MCNC: 17 MG/DL (ref 8–23)
BUN/CREAT SERPL: 17.5 (ref 7–25)
BUPRENORPHINE SERPL-MCNC: NEGATIVE NG/ML
CALCIUM SPEC-SCNC: 9.3 MG/DL (ref 8.6–10.5)
CANNABINOIDS SERPL QL: NEGATIVE
CHLORIDE SERPL-SCNC: 96 MMOL/L (ref 98–107)
CLARITY UR: CLEAR
CO2 BLDA-SCNC: 27.5 MMOL/L (ref 22–33)
CO2 SERPL-SCNC: 25.5 MMOL/L (ref 22–29)
COCAINE UR QL: NEGATIVE
COHGB MFR BLD: 1.4 % (ref 0–5)
COLOR UR: YELLOW
CREAT BLDA-MCNC: 1 MG/DL (ref 0.6–1.3)
CREAT SERPL-MCNC: 0.97 MG/DL (ref 0.76–1.27)
CRP SERPL-MCNC: 3.31 MG/DL (ref 0–0.5)
DEPRECATED RDW RBC AUTO: 47.9 FL (ref 37–54)
EGFRCR SERPLBLD CKD-EPI 2021: 77.9 ML/MIN/1.73
EOSINOPHIL # BLD AUTO: 0.04 10*3/MM3 (ref 0–0.4)
EOSINOPHIL NFR BLD AUTO: 0.7 % (ref 0.3–6.2)
ERYTHROCYTE [DISTWIDTH] IN BLOOD BY AUTOMATED COUNT: 13.2 % (ref 12.3–15.4)
FENTANYL UR-MCNC: NEGATIVE NG/ML
GLOBULIN UR ELPH-MCNC: 2.4 GM/DL
GLUCOSE BLDC GLUCOMTR-MCNC: 291 MG/DL (ref 70–130)
GLUCOSE SERPL-MCNC: 251 MG/DL (ref 65–99)
GLUCOSE UR STRIP-MCNC: ABNORMAL MG/DL
HCO3 BLDA-SCNC: 26.4 MMOL/L (ref 20–26)
HCT VFR BLD AUTO: 39 % (ref 37.5–51)
HCT VFR BLD CALC: 38.1 % (ref 38–51)
HGB BLD-MCNC: 11.9 G/DL (ref 13–17.7)
HGB BLDA-MCNC: 12.4 G/DL (ref 14–18)
HGB UR QL STRIP.AUTO: NEGATIVE
HOLD SPECIMEN: NORMAL
HOLD SPECIMEN: NORMAL
IMM GRANULOCYTES # BLD AUTO: 0.02 10*3/MM3 (ref 0–0.05)
IMM GRANULOCYTES NFR BLD AUTO: 0.4 % (ref 0–0.5)
INHALED O2 CONCENTRATION: 21 %
INR PPP: 0.95 (ref 0.9–1.1)
KETONES UR QL STRIP: ABNORMAL
LEUKOCYTE ESTERASE UR QL STRIP.AUTO: NEGATIVE
LYMPHOCYTES # BLD AUTO: 0.62 10*3/MM3 (ref 0.7–3.1)
LYMPHOCYTES NFR BLD AUTO: 10.9 % (ref 19.6–45.3)
Lab: ABNORMAL
MCH RBC QN AUTO: 30.4 PG (ref 26.6–33)
MCHC RBC AUTO-ENTMCNC: 30.5 G/DL (ref 31.5–35.7)
MCV RBC AUTO: 99.7 FL (ref 79–97)
METHADONE UR QL SCN: NEGATIVE
METHGB BLD QL: 0.2 % (ref 0–3)
MODALITY: ABNORMAL
MONOCYTES # BLD AUTO: 0.58 10*3/MM3 (ref 0.1–0.9)
MONOCYTES NFR BLD AUTO: 10.2 % (ref 5–12)
NEUTROPHILS NFR BLD AUTO: 4.41 10*3/MM3 (ref 1.7–7)
NEUTROPHILS NFR BLD AUTO: 77.4 % (ref 42.7–76)
NITRITE UR QL STRIP: NEGATIVE
NRBC BLD AUTO-RTO: 0 /100 WBC (ref 0–0.2)
OPIATES UR QL: NEGATIVE
OXYCODONE UR QL SCN: NEGATIVE
OXYHGB MFR BLDV: 92.2 % (ref 94–99)
PCO2 BLDA: 37.2 MM HG (ref 35–45)
PCO2 TEMP ADJ BLD: ABNORMAL MM[HG]
PCP UR QL SCN: NEGATIVE
PH BLDA: 7.46 PH UNITS (ref 7.35–7.45)
PH UR STRIP.AUTO: 5.5 [PH] (ref 5–8)
PH, TEMP CORRECTED: ABNORMAL
PLATELET # BLD AUTO: 94 10*3/MM3 (ref 140–450)
PMV BLD AUTO: 9.6 FL (ref 6–12)
PO2 BLDA: 65.6 MM HG (ref 83–108)
PO2 TEMP ADJ BLD: ABNORMAL MM[HG]
POTASSIUM SERPL-SCNC: 4.3 MMOL/L (ref 3.5–5.2)
PROCALCITONIN SERPL-MCNC: 0.07 NG/ML (ref 0–0.25)
PROT SERPL-MCNC: 6.6 G/DL (ref 6–8.5)
PROT UR QL STRIP: ABNORMAL
PROTHROMBIN TIME: 12.7 SECONDS (ref 12.1–14.7)
RBC # BLD AUTO: 3.91 10*6/MM3 (ref 4.14–5.8)
RH BLD: POSITIVE
SAO2 % BLDCOA: 93.7 % (ref 94–99)
SODIUM SERPL-SCNC: 133 MMOL/L (ref 136–145)
SP GR UR STRIP: >1.03 (ref 1–1.03)
T&S EXPIRATION DATE: NORMAL
TRICYCLICS UR QL SCN: NEGATIVE
TSH SERPL DL<=0.05 MIU/L-ACNC: 0.57 UIU/ML (ref 0.27–4.2)
UROBILINOGEN UR QL STRIP: ABNORMAL
VENTILATOR MODE: ABNORMAL
WBC NRBC COR # BLD AUTO: 5.69 10*3/MM3 (ref 3.4–10.8)
WHOLE BLOOD HOLD COAG: NORMAL
WHOLE BLOOD HOLD SPECIMEN: NORMAL

## 2024-12-18 PROCEDURE — 80053 COMPREHEN METABOLIC PANEL: CPT | Performed by: STUDENT IN AN ORGANIZED HEALTH CARE EDUCATION/TRAINING PROGRAM

## 2024-12-18 PROCEDURE — 86850 RBC ANTIBODY SCREEN: CPT | Performed by: STUDENT IN AN ORGANIZED HEALTH CARE EDUCATION/TRAINING PROGRAM

## 2024-12-18 PROCEDURE — 87040 BLOOD CULTURE FOR BACTERIA: CPT | Performed by: STUDENT IN AN ORGANIZED HEALTH CARE EDUCATION/TRAINING PROGRAM

## 2024-12-18 PROCEDURE — P9612 CATHETERIZE FOR URINE SPEC: HCPCS

## 2024-12-18 PROCEDURE — 0042T HC CT CEREBRAL PERFUSION W/WO CONTRAST: CPT

## 2024-12-18 PROCEDURE — 99204 OFFICE O/P NEW MOD 45 MIN: CPT | Performed by: PSYCHIATRY & NEUROLOGY

## 2024-12-18 PROCEDURE — 86140 C-REACTIVE PROTEIN: CPT | Performed by: STUDENT IN AN ORGANIZED HEALTH CARE EDUCATION/TRAINING PROGRAM

## 2024-12-18 PROCEDURE — 80307 DRUG TEST PRSMV CHEM ANLYZR: CPT | Performed by: STUDENT IN AN ORGANIZED HEALTH CARE EDUCATION/TRAINING PROGRAM

## 2024-12-18 PROCEDURE — 86901 BLOOD TYPING SEROLOGIC RH(D): CPT | Performed by: STUDENT IN AN ORGANIZED HEALTH CARE EDUCATION/TRAINING PROGRAM

## 2024-12-18 PROCEDURE — 36600 WITHDRAWAL OF ARTERIAL BLOOD: CPT

## 2024-12-18 PROCEDURE — 82565 ASSAY OF CREATININE: CPT

## 2024-12-18 PROCEDURE — 99223 1ST HOSP IP/OBS HIGH 75: CPT | Performed by: INTERNAL MEDICINE

## 2024-12-18 PROCEDURE — 36415 COLL VENOUS BLD VENIPUNCTURE: CPT

## 2024-12-18 PROCEDURE — 84145 PROCALCITONIN (PCT): CPT | Performed by: STUDENT IN AN ORGANIZED HEALTH CARE EDUCATION/TRAINING PROGRAM

## 2024-12-18 PROCEDURE — 71045 X-RAY EXAM CHEST 1 VIEW: CPT

## 2024-12-18 PROCEDURE — 25010000002 ENOXAPARIN PER 10 MG: Performed by: INTERNAL MEDICINE

## 2024-12-18 PROCEDURE — 70498 CT ANGIOGRAPHY NECK: CPT

## 2024-12-18 PROCEDURE — 70450 CT HEAD/BRAIN W/O DYE: CPT

## 2024-12-18 PROCEDURE — 25510000001 IOPAMIDOL PER 1 ML: Performed by: STUDENT IN AN ORGANIZED HEALTH CARE EDUCATION/TRAINING PROGRAM

## 2024-12-18 PROCEDURE — 86900 BLOOD TYPING SEROLOGIC ABO: CPT | Performed by: STUDENT IN AN ORGANIZED HEALTH CARE EDUCATION/TRAINING PROGRAM

## 2024-12-18 PROCEDURE — 99285 EMERGENCY DEPT VISIT HI MDM: CPT

## 2024-12-18 PROCEDURE — 63710000001 INSULIN GLARGINE PER 5 UNITS: Performed by: INTERNAL MEDICINE

## 2024-12-18 PROCEDURE — 71045 X-RAY EXAM CHEST 1 VIEW: CPT | Performed by: RADIOLOGY

## 2024-12-18 PROCEDURE — 85610 PROTHROMBIN TIME: CPT | Performed by: STUDENT IN AN ORGANIZED HEALTH CARE EDUCATION/TRAINING PROGRAM

## 2024-12-18 PROCEDURE — 93010 ELECTROCARDIOGRAM REPORT: CPT | Performed by: INTERNAL MEDICINE

## 2024-12-18 PROCEDURE — 82948 REAGENT STRIP/BLOOD GLUCOSE: CPT

## 2024-12-18 PROCEDURE — 70496 CT ANGIOGRAPHY HEAD: CPT | Performed by: RADIOLOGY

## 2024-12-18 PROCEDURE — 96372 THER/PROPH/DIAG INJ SC/IM: CPT

## 2024-12-18 PROCEDURE — 84443 ASSAY THYROID STIM HORMONE: CPT | Performed by: STUDENT IN AN ORGANIZED HEALTH CARE EDUCATION/TRAINING PROGRAM

## 2024-12-18 PROCEDURE — 82805 BLOOD GASES W/O2 SATURATION: CPT

## 2024-12-18 PROCEDURE — 83050 HGB METHEMOGLOBIN QUAN: CPT

## 2024-12-18 PROCEDURE — 93005 ELECTROCARDIOGRAM TRACING: CPT | Performed by: STUDENT IN AN ORGANIZED HEALTH CARE EDUCATION/TRAINING PROGRAM

## 2024-12-18 PROCEDURE — 81003 URINALYSIS AUTO W/O SCOPE: CPT | Performed by: STUDENT IN AN ORGANIZED HEALTH CARE EDUCATION/TRAINING PROGRAM

## 2024-12-18 PROCEDURE — 63710000001 INSULIN LISPRO (HUMAN) PER 5 UNITS: Performed by: INTERNAL MEDICINE

## 2024-12-18 PROCEDURE — 85730 THROMBOPLASTIN TIME PARTIAL: CPT | Performed by: STUDENT IN AN ORGANIZED HEALTH CARE EDUCATION/TRAINING PROGRAM

## 2024-12-18 PROCEDURE — 85025 COMPLETE CBC W/AUTO DIFF WBC: CPT | Performed by: STUDENT IN AN ORGANIZED HEALTH CARE EDUCATION/TRAINING PROGRAM

## 2024-12-18 PROCEDURE — 82375 ASSAY CARBOXYHB QUANT: CPT

## 2024-12-18 PROCEDURE — 70496 CT ANGIOGRAPHY HEAD: CPT

## 2024-12-18 PROCEDURE — 70498 CT ANGIOGRAPHY NECK: CPT | Performed by: RADIOLOGY

## 2024-12-18 RX ORDER — NICOTINE POLACRILEX 4 MG
15 LOZENGE BUCCAL
Status: DISCONTINUED | OUTPATIENT
Start: 2024-12-18 | End: 2024-12-19 | Stop reason: HOSPADM

## 2024-12-18 RX ORDER — PANTOPRAZOLE SODIUM 40 MG/1
40 TABLET, DELAYED RELEASE ORAL
Status: DISCONTINUED | OUTPATIENT
Start: 2024-12-19 | End: 2024-12-19 | Stop reason: HOSPADM

## 2024-12-18 RX ORDER — TAMSULOSIN HYDROCHLORIDE 0.4 MG/1
0.4 CAPSULE ORAL NIGHTLY
Status: DISCONTINUED | OUTPATIENT
Start: 2024-12-18 | End: 2024-12-19 | Stop reason: HOSPADM

## 2024-12-18 RX ORDER — DEXTROSE MONOHYDRATE 25 G/50ML
25 INJECTION, SOLUTION INTRAVENOUS
Status: DISCONTINUED | OUTPATIENT
Start: 2024-12-18 | End: 2024-12-19 | Stop reason: HOSPADM

## 2024-12-18 RX ORDER — ACETAMINOPHEN 325 MG/1
650 TABLET ORAL EVERY 4 HOURS PRN
Status: DISCONTINUED | OUTPATIENT
Start: 2024-12-18 | End: 2024-12-19 | Stop reason: HOSPADM

## 2024-12-18 RX ORDER — ENOXAPARIN SODIUM 100 MG/ML
40 INJECTION SUBCUTANEOUS NIGHTLY
Status: DISCONTINUED | OUTPATIENT
Start: 2024-12-18 | End: 2024-12-19 | Stop reason: HOSPADM

## 2024-12-18 RX ORDER — ACETAMINOPHEN 160 MG/5ML
650 SOLUTION ORAL EVERY 4 HOURS PRN
Status: DISCONTINUED | OUTPATIENT
Start: 2024-12-18 | End: 2024-12-19 | Stop reason: HOSPADM

## 2024-12-18 RX ORDER — ATORVASTATIN CALCIUM 40 MG/1
40 TABLET, FILM COATED ORAL NIGHTLY
Status: DISCONTINUED | OUTPATIENT
Start: 2024-12-18 | End: 2024-12-19 | Stop reason: HOSPADM

## 2024-12-18 RX ORDER — CARVEDILOL 6.25 MG/1
6.25 TABLET ORAL 2 TIMES DAILY WITH MEALS
COMMUNITY

## 2024-12-18 RX ORDER — NITROGLYCERIN 0.4 MG/1
0.4 TABLET SUBLINGUAL
Status: DISCONTINUED | OUTPATIENT
Start: 2024-12-18 | End: 2024-12-19 | Stop reason: HOSPADM

## 2024-12-18 RX ORDER — IOPAMIDOL 755 MG/ML
100 INJECTION, SOLUTION INTRAVASCULAR
Status: COMPLETED | OUTPATIENT
Start: 2024-12-18 | End: 2024-12-18

## 2024-12-18 RX ORDER — BICALUTAMIDE 50 MG/1
50 TABLET, FILM COATED ORAL EVERY MORNING
Status: DISCONTINUED | OUTPATIENT
Start: 2024-12-19 | End: 2024-12-19 | Stop reason: HOSPADM

## 2024-12-18 RX ORDER — SODIUM CHLORIDE 0.9 % (FLUSH) 0.9 %
10 SYRINGE (ML) INJECTION AS NEEDED
Status: DISCONTINUED | OUTPATIENT
Start: 2024-12-18 | End: 2024-12-19 | Stop reason: HOSPADM

## 2024-12-18 RX ORDER — GLUCAGON 1 MG/ML
1 KIT INJECTION
Status: DISCONTINUED | OUTPATIENT
Start: 2024-12-18 | End: 2024-12-19 | Stop reason: HOSPADM

## 2024-12-18 RX ORDER — SODIUM CHLORIDE 0.9 % (FLUSH) 0.9 %
10 SYRINGE (ML) INJECTION AS NEEDED
Status: DISCONTINUED | OUTPATIENT
Start: 2024-12-18 | End: 2024-12-18

## 2024-12-18 RX ORDER — NAPROXEN 250 MG/1
250 TABLET ORAL 2 TIMES DAILY WITH MEALS
Status: DISCONTINUED | OUTPATIENT
Start: 2024-12-18 | End: 2024-12-19 | Stop reason: HOSPADM

## 2024-12-18 RX ORDER — ACETAMINOPHEN 650 MG/1
650 SUPPOSITORY RECTAL EVERY 4 HOURS PRN
Status: DISCONTINUED | OUTPATIENT
Start: 2024-12-18 | End: 2024-12-19 | Stop reason: HOSPADM

## 2024-12-18 RX ORDER — TRAMADOL HYDROCHLORIDE 50 MG/1
50 TABLET ORAL 4 TIMES DAILY PRN
Status: DISCONTINUED | OUTPATIENT
Start: 2024-12-18 | End: 2024-12-19 | Stop reason: HOSPADM

## 2024-12-18 RX ORDER — SODIUM CHLORIDE 0.9 % (FLUSH) 0.9 %
10 SYRINGE (ML) INJECTION EVERY 12 HOURS SCHEDULED
Status: DISCONTINUED | OUTPATIENT
Start: 2024-12-18 | End: 2024-12-19 | Stop reason: HOSPADM

## 2024-12-18 RX ORDER — SODIUM CHLORIDE 9 MG/ML
40 INJECTION, SOLUTION INTRAVENOUS AS NEEDED
Status: DISCONTINUED | OUTPATIENT
Start: 2024-12-18 | End: 2024-12-19 | Stop reason: HOSPADM

## 2024-12-18 RX ORDER — NABUMETONE 500 MG/1
500 TABLET, FILM COATED ORAL 2 TIMES DAILY
COMMUNITY

## 2024-12-18 RX ORDER — ASPIRIN 81 MG/1
81 TABLET ORAL DAILY
Status: DISCONTINUED | OUTPATIENT
Start: 2024-12-18 | End: 2024-12-19 | Stop reason: HOSPADM

## 2024-12-18 RX ORDER — ONDANSETRON 2 MG/ML
4 INJECTION INTRAMUSCULAR; INTRAVENOUS EVERY 6 HOURS PRN
Status: DISCONTINUED | OUTPATIENT
Start: 2024-12-18 | End: 2024-12-19 | Stop reason: HOSPADM

## 2024-12-18 RX ORDER — GLIPIZIDE 5 MG/1
5 TABLET ORAL
Status: CANCELLED | OUTPATIENT
Start: 2024-12-19

## 2024-12-18 RX ORDER — CARVEDILOL 6.25 MG/1
6.25 TABLET ORAL 2 TIMES DAILY WITH MEALS
Status: DISCONTINUED | OUTPATIENT
Start: 2024-12-18 | End: 2024-12-19 | Stop reason: HOSPADM

## 2024-12-18 RX ORDER — INSULIN LISPRO 100 [IU]/ML
2-7 INJECTION, SOLUTION INTRAVENOUS; SUBCUTANEOUS
Status: DISCONTINUED | OUTPATIENT
Start: 2024-12-18 | End: 2024-12-19 | Stop reason: HOSPADM

## 2024-12-18 RX ADMIN — ASPIRIN 81 MG: 81 TABLET, COATED ORAL at 21:04

## 2024-12-18 RX ADMIN — ATORVASTATIN CALCIUM 40 MG: 40 TABLET, FILM COATED ORAL at 21:04

## 2024-12-18 RX ADMIN — TAMSULOSIN HYDROCHLORIDE 0.4 MG: 0.4 CAPSULE ORAL at 21:04

## 2024-12-18 RX ADMIN — INSULIN LISPRO 4 UNITS: 100 INJECTION, SOLUTION INTRAVENOUS; SUBCUTANEOUS at 21:57

## 2024-12-18 RX ADMIN — IOPAMIDOL 140 ML: 755 INJECTION, SOLUTION INTRAVENOUS at 15:53

## 2024-12-18 RX ADMIN — NAPROXEN 250 MG: 250 TABLET ORAL at 21:57

## 2024-12-18 RX ADMIN — Medication 10 ML: at 22:09

## 2024-12-18 RX ADMIN — CARVEDILOL 6.25 MG: 6.25 TABLET, FILM COATED ORAL at 21:04

## 2024-12-18 RX ADMIN — ENOXAPARIN SODIUM 40 MG: 40 INJECTION SUBCUTANEOUS at 21:04

## 2024-12-18 RX ADMIN — INSULIN GLARGINE 10 UNITS: 100 INJECTION, SOLUTION SUBCUTANEOUS at 21:57

## 2024-12-18 NOTE — H&P
[facility] Medicine Services  HISTORY & PHYSICAL    Patient Identification:  Name:  Teddy De Leon  Age:  84 y.o.  Sex:  male  :  1940  MRN:  [id number]   Visit Number:  [id number]  Admit Date: 2024   Primary Care Physician:  Roman Lebron DO     Subjective     Chief complaint:   Chief Complaint   Patient presents with    Altered Mental Status     History of presenting illness:   Patient is an 84-year-old male with history of ischemic cardiomyopathy status post AICD placement in 2016, CAD with history of PCI who presented to the emergency room this afternoon due to altered mental status.  Wife provides most of the history.  Reports patient went to primary care provider yesterday and was told all of his labs were normal and discussed some additional treatment for management of his diabetes.  This morning when patient woke up he appeared to be confused and was not able to get out of bed and had urinary incontinence which is very unusual for him.  Throughout the day he had difficulty ambulating and following directions and appeared very confused compared to his baseline therefore she brought him to the emergency room.  In the emergency room there were some concern for possible left-sided weakness as well as orientation deficits.  Wife denies any recent illness, fevers, chills, nausea, vomiting or bleeding.  ---------------------------------------------------------------------------------------------------------------------   Review of Systems : Negative except as per HPI  ---------------------------------------------------------------------------------------------------------------------   Past Medical History:   Diagnosis Date    Arthritis     sites varies     Coronary artery disease     stent inserted by Dr Palencia     Diabetes mellitus     insulin and po meds     GERD (gastroesophageal reflux disease)     Hyperlipidemia     Injury of back     Iron deficiency anemia  10/31/2016    Myocardial infarction 1998    Prostate cancer     radiation treatment -no surgery     Wears glasses      Past Surgical History:   Procedure Laterality Date    CARDIAC CATHETERIZATION      CARDIAC ELECTROPHYSIOLOGY PROCEDURE N/A 2018    Procedure: Implant ICD - bi ventricular;  Surgeon: Nas Quevedo DO;  Location: [facility] INVASIVE LOCATION;  Service: Cardiology    CATARACT EXTRACTION, BILATERAL      2024 and May 2024    COLONOSCOPY      CORONARY STENT PLACEMENT      FRACTURE SURGERY Right     rt arm repair     SKIN BIOPSY      nose -small area     Family History   Problem Relation Age of Onset    Cancer Mother         breast    Heart attack Mother     Hypertension Father     Breast cancer Sister     Breast cancer Sister     Breast cancer Sister     No Known Problems Sister     No Known Problems Brother      Social History     Socioeconomic History    Marital status:    Tobacco Use    Smoking status: Former     Current packs/day: 0.00     Average packs/day: 2.0 packs/day for 19.0 years (38.0 ttl pk-yrs)     Types: Cigarettes     Start date:      Quit date:      Years since quittin.9    Smokeless tobacco: Former     Types: Chew     Quit date:    Vaping Use    Vaping status: Never Used   Substance and Sexual Activity    Alcohol use: No    Drug use: No    Sexual activity: Defer     ---------------------------------------------------------------------------------------------------------------------   Allergies:  Patient has no known allergies.  ---------------------------------------------------------------------------------------------------------------------   Medications below are reported home medications pulling from within the system; at this time, these medications have not been reconciled unless otherwise specified and are in the verification process for further verifcation as current home medications.    Prior to Admission Medications       Prescriptions  Last Dose Informant Patient Reported? Taking?    atorvastatin (LIPITOR) 40 MG tablet 12/17/2024 Self Yes Yes    Take 1 tablet by mouth Every Night.    bicalutamide (CASODEX) 50 MG tablet 12/18/2024  No Yes    Take 1 tablet by mouth Every Morning.    carvedilol (COREG) 6.25 MG tablet 12/18/2024  Yes Yes    Take 1 tablet by mouth 2 (Two) Times a Day With Meals.    glimepiride (AMARYL) 2 MG tablet 12/18/2024  No Yes    Take 1 tablet by mouth 2 (Two) Times a Day.    Insulin Glargine (Lantus SoloStar) 100 UNIT/ML injection pen 12/18/2024  Yes Yes    Inject 40 Units under the skin into the appropriate area as directed Daily.    metFORMIN (GLUCOPHAGE) 500 MG tablet 12/18/2024 Self Yes Yes    Take 2 tablets by mouth 2 (Two) Times a Day.    nabumetone (RELAFEN) 500 MG tablet 12/18/2024  Yes Yes    Take 1 tablet by mouth 2 (Two) Times a Day.    omeprazole (priLOSEC) 20 MG capsule 12/18/2024  No Yes    Take 1 capsule by mouth Every Morning.    tamsulosin (FLOMAX) 0.4 MG capsule 24 hr capsule 12/17/2024 Self Yes Yes    Take 1 capsule by mouth Every Night.    aspirin 81 MG EC tablet Unknown Self No No    Take 1 tablet by mouth Daily.    traMADol (ULTRAM) 50 MG tablet Unknown Self Yes No    Take 1 tablet by mouth 4 (Four) Times a Day As Needed for Moderate Pain.          ---------------------------------------------------------------------------------------------------------------------    Objective     Hospital Scheduled Meds:          Current listed hospital scheduled medications may not yet reflect those currently placed in orders that are signed and held, awaiting patient's arrival to floor/unit.    ---------------------------------------------------------------------------------------------------------------------   Vital Signs:  Temp:  [98.8 °F (37.1 °C)] 98.8 °F (37.1 °C)  Heart Rate:  [73-82] 73  Resp:  [16] 16  BP: (102-160)/(59-97) 135/79  Mean Arterial Pressure (Non-Invasive) for the past 24 hrs (Last 3 readings):    Noninvasive MAP (mmHg)   12/18/24 1815 100   12/18/24 1800 96   12/18/24 1745 102     SpO2 Percentage    12/18/24 1745 12/18/24 1800 12/18/24 1815   SpO2: 93% 95% 96%     SpO2:  [91 %-98 %] 96 %  on  Flow (L/min) (Oxygen Therapy):  [2] 2;   Device (Oxygen Therapy): room air    Body mass index is 26.2 kg/m².  Wt Readings from Last 3 Encounters:   12/18/24 82.8 kg (182 lb 9.6 oz)   12/17/24 82.8 kg (182 lb 9.6 oz)   11/19/24 86.1 kg (189 lb 14.4 oz)     ---------------------------------------------------------------------------------------------------------------------   Physical Exam:  GEN: NAD  EYES: Pupils are round and equal bilaterally, no scleral icterus  HENT: False dentition in place, moist oral mucosa, no oropharyngeal exudate  CV: RRR with mild systolic murmur  PULM: CTA, no wheeze or crackles on room air  GI: Obese abdomen, normal bowel sounds, soft and nontender  NEURO: No facial droop, normal speech, mild left lower extremity weakness noted compared to right.  Mild left proximal upper extremity weakness also noted compared to right.  Finger-to-nose intact bilaterally.  Some delayed responses to questions.  SKIN: No peripheral edema, no rashes  PSYCH: Alert and oriented to person and place and partially time.  He is able to make some jokes showing his baseline sense of humor.  He is unable to recall earlier events this morning.  ---------------------------------------------------------------------------------------------------------------------  EKG:    Paced rhythm    ---------------------------------------------------------------------------------------------------------------------           Results from last 7 days   Lab Units 12/18/24  1521   PH, ARTERIAL pH units 7.459*   PO2 ART mm Hg 65.6*   PCO2, ARTERIAL mm Hg 37.2   HCO3 ART mmol/L 26.4*     Results from last 7 days   Lab Units 12/18/24  1536   CRP mg/dL 3.31*   WBC 10*3/mm3 5.69   HEMOGLOBIN g/dL 11.9*   HEMATOCRIT % 39.0   MCV fL 99.7*   MCHC  "g/dL 30.5*   PLATELETS 10*3/mm3 94*   INR  0.95     Results from last 7 days   Lab Units 12/18/24  1536 12/18/24  1534   SODIUM mmol/L 133*  --    POTASSIUM mmol/L 4.3  --    CHLORIDE mmol/L 96*  --    CO2 mmol/L 25.5  --    BUN mg/dL 17  --    CREATININE mg/dL 0.97 1.00   CALCIUM mg/dL 9.3  --    GLUCOSE mg/dL 251*  --    ALBUMIN g/dL 4.2  --    BILIRUBIN mg/dL 0.7  --    ALK PHOS U/L 65  --    AST (SGOT) U/L 11  --    ALT (SGPT) U/L 9  --    Estimated Creatinine Clearance: 68.8 mL/min (by C-G formula based on SCr of 0.97 mg/dL).  No results found for: \"AMMONIA\"    No results found for: \"HGBA1C\", \"POCGLU\"  Lab Results   Component Value Date    HGBA1C 9.50 (H) 11/19/2024     Lab Results   Component Value Date    TSH 0.569 12/18/2024     Pain Management Panel  More data exists         Latest Ref Rng & Units 12/18/2024 8/17/2023   Pain Management Panel   Creatinine, Urine mg/dL - 156.4    Amphetamine, Urine Qual Negative Negative  -   Barbiturates Screen, Urine Negative Negative  -   Benzodiazepine Screen, Urine Negative Negative  -   Buprenorphine, Screen, Urine Negative Negative  -   Cocaine Screen, Urine Negative Negative  -   Fentanyl, Urine Negative Negative  -   Methadone Screen , Urine Negative Negative  -   Methamphetamine, Ur Negative Negative  -      Details                 I have personally reviewed the above laboratory results.   ---------------------------------------------------------------------------------------------------------------------  Imaging Results (Last 7 Days)       Procedure Component Value Units Date/Time    XR Chest 1 View [[id number]] Collected: 12/18/24 1602     Updated: 12/18/24 1605    Narrative:      XR CHEST 1 VW-     CLINICAL INDICATION: Acute Stroke Protocol (onset < 12 hrs)        COMPARISON: 11/28/2020     TECHNIQUE: Single frontal view of the chest.     FINDINGS:     LUNGS: Lungs are adequately aerated.      HEART AND MEDIASTINUM: Heart and mediastinal contours are " unremarkable        SKELETON: Bony and soft tissue structures are unremarkable.             Impression:      No radiographic evidence of acute cardiac or pulmonary disease.           This report was finalized on 12/18/2024 4:03 PM by Dr. Milad Wong MD.       CT Angiogram Head w AI Analysis of LVO [[id number]] Collected: 12/18/24 1601     Updated: 12/18/24 1604    Narrative:      EXAM:    CT Angiography Head With Intravenous Contrast     EXAM DATE:    12/18/2024 3:51 PM     CLINICAL HISTORY:    Stroke, follow up     TECHNIQUE:    Axial computed tomographic angiography images of the head with  intravenous contrast. LVO analysis was performed with [email or web address] software.   This CT exam was performed using one or more of the following dose  reduction techniques:  automated exposure control, adjustment of the mA  and/or kV according to patient size, and/or use of iterative  reconstruction technique.    MIP reconstructed images were created and reviewed.     COMPARISON:    No relevant prior studies available.     FINDINGS:    RIGHT INTERNAL CAROTID ARTERY:  No acute findings.  Intracranial  segment is patent with no significant stenosis.  No aneurysm.    RIGHT ANTERIOR CEREBRAL ARTERY:  Unremarkable.  No occlusion or  significant stenosis.  No aneurysm.    RIGHT MIDDLE CEREBRAL ARTERY:  Unremarkable.  No occlusion or  significant stenosis.  No aneurysm.    RIGHT POSTERIOR CEREBRAL ARTERY:  Unremarkable.  No occlusion or  significant stenosis.  No aneurysm.    RIGHT VERTEBRAL ARTERY:  Unremarkable as visualized.       LEFT INTERNAL CAROTID ARTERY:  No acute findings.  Intracranial  segment is patent with no significant stenosis.  No aneurysm.    LEFT ANTERIOR CEREBRAL ARTERY:  Unremarkable.  No occlusion or  significant stenosis.  No aneurysm.    LEFT MIDDLE CEREBRAL ARTERY:  Unremarkable.  No occlusion or  significant stenosis.  No aneurysm.    LEFT POSTERIOR CEREBRAL ARTERY:  Unremarkable.  No occlusion or  significant  stenosis.  No aneurysm.    LEFT VERTEBRAL ARTERY:  Unremarkable as visualized.       BASILAR ARTERY:  Unremarkable.  No occlusion or significant stenosis.   No aneurysm.    OTHER FINDINGS:  No hemodynamically significant stenosis or aneurysm  identified.       Impression:        No hemodynamically significant stenosis or aneurysm identified.     This report was finalized on 12/18/2024 4:02 PM by Dr. Milad Wong MD.       CT Angiogram Neck [[id number]] Collected: 12/18/24 1600     Updated: 12/18/24 1603    Narrative:      EXAM:    CT Angiography Neck With Intravenous Contrast     EXAM DATE:    12/18/2024 3:52 PM     CLINICAL HISTORY:    Stroke, follow up     TECHNIQUE:    Axial computed tomographic angiography images of the neck with  intravenous contrast.  This CT exam was performed using one or more of  the following dose reduction techniques:  automated exposure control,  adjustment of the mA and/or kV according to patient size, and/or use of  iterative reconstruction technique.    MIP reconstructed images were created and reviewed.     COMPARISON:    None.     FINDINGS:      VASCULATURE:    RIGHT COMMON CAROTID ARTERY:  Unremarkable.  No occlusion or  significant stenosis.  No dissection.    RIGHT INTERNAL CAROTID ARTERY:  Unremarkable.  Extracranial segment is  patent with no occlusion or significant stenosis.  No dissection.    RIGHT EXTERNAL CAROTID ARTERY:  Unremarkable.  No occlusion.    RIGHT VERTEBRAL ARTERY:  Unremarkable.  No occlusion or significant  stenosis.  No dissection.       LEFT COMMON CAROTID ARTERY:  Unremarkable.  No occlusion or  significant stenosis.  No dissection.    LEFT INTERNAL CAROTID ARTERY:  Unremarkable.  Extracranial segment is  patent with no occlusion or significant stenosis.  No dissection.    LEFT EXTERNAL CAROTID ARTERY:  Unremarkable.  No occlusion.    LEFT VERTEBRAL ARTERY:  Unremarkable.  No occlusion or significant  stenosis.  No dissection.      NECK:    BONES/JOINTS:   Unremarkable.  No acute fracture.    SOFT TISSUES:  Unremarkable.    LUNG APICES:  Clear.    OTHER FINDINGS:  No hemodynamically significant stenosis detected.      CAROTID STENOSIS REFERENCE USING NASCET CRITERIA:    % ICA stenosis = (1 - narrowest ICA diameter/diameter of distal  cervical ICA) x 100.    Mild - <50% stenosis.    Moderate - 50-69% stenosis.    Severe - 70-94% stenosis.    Near occlusion - 95-99% stenosis.    Occluded - 100% stenosis.       Impression:        No hemodynamically significant stenosis detected.b     This report was finalized on 12/18/2024 4:01 PM by Dr. Milad Wong MD.       CT CEREBRAL PERFUSION WITH & WITHOUT CONTRAST [[id number]] Resulted: 12/18/24 1552     Updated: 12/18/24 1554    CT Head Without Contrast Stroke Protocol [[id number]] Collected: 12/18/24 1530     Updated: 12/18/24 1532    Narrative:      CT HEAD WO CONTRAST STROKE PROTOCOL-     CLINICAL INDICATION: Neuro deficit, acute, stroke suspected        COMPARISON: 10/28/2016     TECHNIQUE: Axial images of the brain were obtained with out intravenous  contrast.  Reformatted images were created in the sagittal and coronal  planes.     DOSE:     Radiation dose reduction techniques were utilized per ALARA protocol.  Automated exposure control was initiated through either or CareDose or  [email or web address] software packages by  protocol.        FINDINGS:    BRAIN:  Unremarkable.  No hemorrhage.  No significant white matter  disease.  No edema.       VENTRICLES:  Unremarkable.  No ventriculomegaly.       BONES/JOINTS:  Unremarkable.  No acute fracture.       SOFT TISSUES:  Unremarkable.       SINUSES:  no air fluid levels       MASTOID AIR CELLS:  Unremarkable as visualized.  No mastoid effusion.          Impression:         1. No evidence of an acute ischemic event  2. No parenchymal mass, hemorrhage, or midline shift  3. Results were relayed to the emergency department at 3:29 p.m.     This report was finalized on 12/18/2024  3:30 PM by Dr. Milad Wong MD.               Last Echocardiogram:  Results for orders placed during the hospital encounter of 09/20/18    Adult Transthoracic Echo Limited W/ Cont if Necessary Per Protocol    Interpretation Summary  · Mild mitral valve regurgitation is present.  · Mild aortic valve regurgitation is present.  · The following left ventricular wall segments are hypokinetic: mid anterior, apical anterior, basal anterolateral, mid anterolateral, apical lateral, basal inferolateral, mid inferolateral, apical inferior, mid inferior, basal inferoseptal, basal anterior, basal inferior and basal inferoseptal. The following left ventricular wall segments are dyskinetic: apical septal, mid inferoseptal, mid anteroseptal and apex.  · The left ventricular cavity is mild-to-moderately dilated.  · Left ventricular systolic function is severely decreased. Estimated EF = 25%.  · Left atrial cavity size is mild-to-moderately dilated.  · There is no evidence of pericardial effusion.  · No evidence of pulmonary hypertension is present.  · Mild MAC is present.  · The aortic valve exhibits sclerosis.  · There is no evidence of a left ventricular mass or thrombus present.  · Saline test for shunting not performed.    ---------------------------------------------------------------------------------------------------------------------    Assessment & Plan      -Acute neurologic encephalopathy-concerning for possible CVA versus TIA versus seizure.  CT head is negative for intracranial hemorrhage or massive stroke.  CTA head and neck so far negative.  CT perfusion pending.  MRI brain was not able to be completed due to AICD.  Neurology was consulted and recommending admission and obtaining EEG.  Patient and family on unaware of the type of AICD.  Plan to identify type and  to assess compatibility with MRI and if compatible to pursue MRI tomorrow.  Continue seizure precautions, neurochecks and NIH  scoring.    CHRONIC MEDICAL PROBLEMS:   -Type 2 diabetes-appears to be uncontrolled with A1c 9.3% recently.  Per primary care provider note plan was to start Ozempic however not started yet.  Jardiance was stopped during this visit.  He is currently on metformin and glipizide which both will be held during this admission especially given IV contrast exposure.  He has been using glargine at nighttime.  Will start a low-dose glargine at 10 units tonight as well as SSI and titrate as needed.  -Hypertension-seems to be well-controlled with carvedilol  -Chronic systolic heart failure-EF 21 to 25%.  Status post AICD.  Continue carvedilol.  Not on ACE/ARB/ARNI/MRA.  Jardiance was recently stopped by primary care provider as DM management was being adjusted. No report of fungal UTI. Per wife he takes lasix every day (will hold for now given IV contrast exposure).  No evidence of hypervolemia  -CAD with history of PCI-on aspirin and statin  -History of nonsustained VT-status post AICD  -History of prostate cancer-status post XRT.  On Casodex   -BPH-Flomax    ---------------------------------------------------  DVT Prophylaxis: Lovenox      The patient is considered to be a high risk patient due to: Age, possible CVA, systolic heart failure and uncontrolled diabetes      Code Status: Full    I have discussed the patient's assessment and plan with patient and family     Disposition: admit to telemetry    Katayoun Behbahani, MD  Hospitalist Service -- [facility]       12/18/24  18:39 EST

## 2024-12-18 NOTE — CASE MANAGEMENT/SOCIAL WORK
Discharge Planning Assessment   [facility]     Patient Name: Teddy Vanessa  MRN: [id number]  Today's Date: 12/18/2024    Admit Date: 12/18/2024    Plan: Spoke with patients family at bedside. Patient lives at home with spouse and will return at discharge. Patient has no POA or Living Will. Patient uses a straight cane family made and has no HH. Patients PCP Roman Lebron and pharmacy [place], [place]. Patients family will transport home at discharge.   Discharge Needs Assessment       Row Name 12/18/24 1830       Living Environment    People in Home spouse    Name(s) of People in Home CRYSTAL VANESSA Spouse [phone]    Current Living Arrangements home    Potentially Unsafe Housing Conditions none    In the past 12 months has the electric, gas, oil, or water company threatened to shut off services in your home? No    Primary Care Provided by self;spouse/significant other    Provides Primary Care For no one    Family Caregiver if Needed spouse    Family Caregiver Names CRYSTAL VANESSA Spouse [phone]    Quality of Family Relationships helpful;involved;supportive    Able to Return to Prior Arrangements yes       Resource/Environmental Concerns    Resource/Environmental Concerns none    Transportation Concerns none       Transportation Needs    In the past 12 months, has lack of transportation kept you from medical appointments or from getting medications? no    In the past 12 months, has lack of transportation kept you from meetings, work, or from getting things needed for daily living? No       Food Insecurity    Within the past 12 months, you worried that your food would run out before you got the money to buy more. Never true    Within the past 12 months, the food you bought just didn't last and you didn't have money to get more. Never true       Transition Planning    Patient/Family Anticipates Transition to home with family    Patient/Family Anticipated Services at  Transition none       Discharge Needs Assessment    Readmission Within the Last 30 Days no previous admission in last 30 days    Equipment Currently Used at Home cane, straight    Concerns to be Addressed discharge planning    Do you want help finding or keeping work or a job? I do not need or want help    Do you want help with school or training? For example, starting or completing job training or getting a high school diploma, GED or equivalent No    Anticipated Changes Related to Illness none    Equipment Needed After Discharge none                   Discharge Plan       Row Name 12/18/24 1830       Plan    Plan Spoke with patients family at bedside. Patient lives at home with spouse and will return at discharge. Patient has no POA or Living Will. Patient uses a straight cane family made and has no HH. Patients PCP Roman Lebron and pharmacy [place], [place]. Patients family will transport home at discharge.    Patient/Family in Agreement with Plan yes                  Continued Care and Services - Admitted Since 12/18/2024    No active coordination exists for this encounter.       Expected Discharge Date and Time       Expected Discharge Date Expected Discharge Time    Dec 21, 2024            Demographic Summary       Row Name 12/18/24 1830       General Information    Admission Type inpatient    Arrived From home    Referral Source emergency department    Reason for Consult discharge planning    Preferred Language English                     Jayla Jimenez

## 2024-12-18 NOTE — ED NOTES
Patient wife and family now at bedside. Pt wife states that this morning at 0900, she noticed that patient was pressing against her while laying in bed. She got up to check on him and noticed that he was starring off and not responding to questions. She also states that she noticed that patient had urinated in the bed, which is not like him. Wife reports that patient was unable to follow simple commands and seemed to not understand what she was saying. She noticed that he also was leaning to the right side as he was walking with her walker. She states that he went to bed last night acting his normal self.

## 2024-12-18 NOTE — ED PROVIDER NOTES
Subjective   History of Present Illness  Patient is an 84-year-old male with history significant for CAD with prior PCI, type 2 diabetes mellitus and prostate cancer status post XRT who comes to the ER for altered mental status.  Patient is alert to self and place but not time or president.  He is answers are slow and sluggish.  He is awake and protecting airway.  History obtained by family present at bedside.  His wife states he was at his baseline when he went to bed last night.  He only complained about a sore throat which he said was better this morning.  She states around 9 AM, he was lying in bed and pushing back on her.  She tried to talk to him and he would not respond.  She looked over and he was just staring into the distance and had voided on himself which is unusual.  She states he eventually did respond but only to certain questions and then would not respond.  She helped him out of bed and got her walker in which he did.  He was falling towards the right side.  She stated he continued to look and act confused and not his normal self.  He was able to eat some breakfast and did not get strangled.  Given his persistent altered status, they brought him to the ER for evaluation.  No fevers or chills, no nausea or vomiting or diarrhea.  No known sick contacts.  No recent change in medications.  He does not take anticoagulation.    Last known normal 9 AM this morning.  Code stroke called.      Review of Systems   Unable to perform ROS: Mental status change       Past Medical History:   Diagnosis Date    Arthritis     sites varies     Coronary artery disease 1998    stent inserted by Dr Palencia     Diabetes mellitus     insulin and po meds     GERD (gastroesophageal reflux disease)     Hyperlipidemia     Injury of back     Iron deficiency anemia 10/31/2016    Myocardial infarction 1998    Prostate cancer     radiation treatment -no surgery     Wears glasses        No Known Allergies    Past Surgical History:    Procedure Laterality Date    CARDIAC CATHETERIZATION      CARDIAC ELECTROPHYSIOLOGY PROCEDURE N/A 2018    Procedure: Implant ICD - bi ventricular;  Surgeon: Nas Quevedo DO;  Location: [facility] INVASIVE LOCATION;  Service: Cardiology    CATARACT EXTRACTION, BILATERAL      2024 and May 2024    COLONOSCOPY      CORONARY STENT PLACEMENT      FRACTURE SURGERY Right     rt arm repair     SKIN BIOPSY      nose -small area       Family History   Problem Relation Age of Onset    Cancer Mother         breast    Heart attack Mother     Hypertension Father     Breast cancer Sister     Breast cancer Sister     Breast cancer Sister     No Known Problems Sister     No Known Problems Brother        Social History     Socioeconomic History    Marital status:    Tobacco Use    Smoking status: Former     Current packs/day: 0.00     Average packs/day: 2.0 packs/day for 19.0 years (38.0 ttl pk-yrs)     Types: Cigarettes     Start date:      Quit date:      Years since quittin.9    Smokeless tobacco: Former     Types: Chew     Quit date:    Vaping Use    Vaping status: Never Used   Substance and Sexual Activity    Alcohol use: No    Drug use: No    Sexual activity: Defer           Objective   Physical Exam  Vitals and nursing note reviewed.   Constitutional:       General: He is not in acute distress.     Appearance: He is well-developed and normal weight. He is not ill-appearing.   HENT:      Head: Normocephalic.      Mouth/Throat:      Mouth: Mucous membranes are moist.      Pharynx: Oropharynx is clear.   Eyes:      Extraocular Movements: Extraocular movements intact.      Pupils: Pupils are equal, round, and reactive to light.   Neck:      Vascular: No JVD.   Cardiovascular:      Rate and Rhythm: Normal rate and regular rhythm.      Heart sounds: No murmur heard.     No friction rub. No gallop.   Pulmonary:      Effort: Pulmonary effort is normal. No tachypnea.      Breath sounds:  Normal breath sounds. No decreased breath sounds, wheezing, rhonchi or rales.   Abdominal:      General: Bowel sounds are normal.      Palpations: Abdomen is soft.   Musculoskeletal:         General: Normal range of motion.      Cervical back: Normal range of motion and neck supple.      Right lower leg: No edema.      Left lower leg: No edema.   Skin:     General: Skin is warm and dry.      Capillary Refill: Capillary refill takes less than 2 seconds.   Neurological:      General: No focal deficit present.      Mental Status: He is alert. He is disoriented and confused.      Gait: Gait abnormal.      Comments: Full neuroexam difficult to perform as patient has difficulty following commands.  Alert to self, place but not time.  Diminished sensation on the left side of the face.  Decreased  strength in the left hand.  Upper extremity left-sided weaker than right.  Could not follow finger-nose testing.           Procedures           ED Course  ED Course as of 12/18/24 1758   Wed Dec 18, 2024   1521 ECG 12 Lead Stroke Evaluation  Ventricular paced rhythm, rate 78, QTc 474, no acute ST or T wave changes [CW]      ED Course User Index  [CW] Richmond Howe, DO                                NIH Stroke Scale: 6                        Medical Decision Making  --On arrival, patient alert to self, place but sluggish and slow in response.  Outside the window for thrombolytics.  Not a candidate for thrombectomy due to no LVO.  No meningeal signs.  --labs unremarkable  --ABG ok  --CT head neg  --CT angios neg  --CT perfusion study negative  --unable to do MRI d/t AICD  --d/w neuro, recommended overnight observation, EEG tomorrow, stroke scale with neuro evaluated with 0, he did have some improvement from a strength standpoint but remained confused and not back to baseline  --Bladder scan with approximately 250 cc, will need to monitor to ensure urine retention not playing a role as some of the story sounds like  overflow incontinence  --will call [facility] for MRI in am  --Discussed with medicine, will admit    Problems Addressed:  Altered mental status, unspecified altered mental status type: complicated acute illness or injury  TIA (transient ischemic attack): complicated acute illness or injury    Amount and/or Complexity of Data Reviewed  Labs: ordered.  Radiology: ordered.  ECG/medicine tests: ordered. Decision-making details documented in ED Course.    Risk  Prescription drug management.        Final diagnoses:   Altered mental status, unspecified altered mental status type   TIA (transient ischemic attack)       ED Disposition  ED Disposition       ED Disposition   Decision to Admit    Condition   --    Comment   --               No follow-up provider specified.       Medication List      No changes were made to your prescriptions during this visit.            Richmond Howe,   12/18/24 3475

## 2024-12-18 NOTE — CONSULTS
DOS: 2024  NAME: Teddy De Leon   : 1940  PCP: Roman Lebron DO  CC: Altered mentation since 9 AM  Referring MD: Richmond Howe DO    Neurological Problem and Interval History:  84 y.o. right-handed white male with a Hx of prostate cancer for which she had received radiation therapy in the last  and  and has been declared cancer free was found around 9 AM to be staring at his wife and not verbally communicating.  She then woke him up and he got up and he wanted to use the bathroom and at that point it was noticeable he just urinated on the floor without being able to reach the bathroom.  There was no tonic-clonic convulsions noted or any kind of tongue bite but he seemed to be staring off into space.  He has been on tramadol for quite some time which can lower seizure threshold but he has not had any kind of seizures in the past.  He usually takes a tramadol at bedtime.  When I saw him through the telemedicine device and noticed he is awake and alert and oriented to himself and knows his date of birth and knows his wife at the bedside and knows where he is and upon cueing he was able to tell me the correct month but he could not tell me the correct year.  He was able to follow one-step and two-step commands well.  He was able to read aloud most of the words and phrases presented to him and also recognize most of the pictures and a complex picture.  There is no facial droop noticeable and his speech is clear and intact.  There is no visual loss noted but he usually uses corrective lenses.  He is hard of hearing.  There is no drift noticeable in the upper or lower extremities and the finger-to-nose coordination and heel-to-shin testing are all intact.  There is no loss of sensations on either side of the face or the upper and lower extremities and no extinction on double simultaneous presentation of noxious stimulation.  He was able to sit up by the side of the bed independently and stood  up with some assistance as he usually walks with a cane or a walker because of back injury.  He passed bedside swallow.    Past Medical/Surgical Hx:  Past Medical History:   Diagnosis Date    Arthritis     sites varies     Coronary artery disease 1998    stent inserted by Dr Palencia     Diabetes mellitus     insulin and po meds     GERD (gastroesophageal reflux disease)     Hyperlipidemia     Injury of back     Iron deficiency anemia 10/31/2016    Myocardial infarction 1998    Prostate cancer     radiation treatment -no surgery     Wears glasses      Past Surgical History:   Procedure Laterality Date    CARDIAC CATHETERIZATION      CARDIAC ELECTROPHYSIOLOGY PROCEDURE N/A 09/20/2018    Procedure: Implant ICD - bi ventricular;  Surgeon: Nas Quevedo DO;  Location: [facility] INVASIVE LOC[facility];  Service: Cardiology    CATARACT EXTRACTION, BILATERAL      April 2024 and May 2024    COLONOSCOPY      CORONARY STENT PLACEMENT      FRACTURE SURGERY Right     rt arm repair     SKIN BIOPSY  1993    nose -small area       Review of Systems:    Constitutional: Pleasant gentleman currently laying in the gurney in no distress  Cardiovascular: No chest pain or palpitations noted.  Respiratory: No shortness of breath noted.  Gastrointestinal: No nausea and vomiting noted.  Genitourinary: No bladder incontinence noted.  However he usually uses adult diapers.  Musculoskeletal: No aches and pains in the muscles or joints noted.  Dermatological: No skin breakdown noted.  Neurological: NIH stroke scale of 0  Psychiatric: No major anxiety or depression noted.  Ophthalmological: No visual changes noted.          Medications On Admission  (Not in a hospital admission)      Prior to Admission medications    Medication Sig Start Date End Date Taking? Authorizing Provider   aspirin 81 MG EC tablet Take 1 tablet by mouth Daily. 11/2/16   Raman Rae MD   atorvastatin (LIPITOR) 40 MG tablet Take 1 tablet by mouth Every Night.    Provider,  MD Rosalina   bicalutamide (CASODEX) 50 MG tablet Take 1 tablet by mouth Every Morning. 12/10/24   Roman Lebron DO   carvedilol (COREG) 12.5 MG tablet Take 1 tablet by mouth 2 (Two) Times a Day.  Patient taking differently: Take 1 tablet by mouth Daily. 20   Faviola Bonner PA   Continuous Blood Gluc Sensor (Dexcom G7 Sensor) misc 1 each Every 10 (Ten) Days.    Rosalina Adhikari MD   glimepiride (AMARYL) 2 MG tablet Take 1 tablet by mouth 2 (Two) Times a Day. 23   Carlotta Moncada APRN   Glucagon HCl (Glucagon Emergency) 1 MG/ML reconstituted solution Inject 1 mg as directed As Needed (for severe low blood sugar). 2/15/23   Carlotta Moncada APRN   Insulin Glargine (Lantus SoloStar) 100 UNIT/ML injection pen Inject 20-45 Units under the skin into the appropriate area as directed Daily. 20-25 units twice daily    Rosalina Adhikari MD   lisinopril (PRINIVIL,ZESTRIL) 10 MG tablet Take 1 tablet by mouth Daily. 18   Clayton Dickinson MD   metFORMIN (GLUCOPHAGE) 500 MG tablet Take 2 tablets by mouth 2 (Two) Times a Day.    Rosalina Adhikari MD   omeprazole (priLOSEC) 20 MG capsule Take 1 capsule by mouth Every Morning. 12/10/24   Roman Lebron DO   tamsulosin (FLOMAX) 0.4 MG capsule 24 hr capsule Take 1 capsule by mouth Every Night.    Rosalina Adhikari MD   traMADol (ULTRAM) 50 MG tablet Take 1 tablet by mouth 4 (Four) Times a Day As Needed for Moderate Pain.    Rosalina Adhikari MD        Allergies:  No Known Allergies    Social Hx:  Social History     Socioeconomic History    Marital status:    Tobacco Use    Smoking status: Former     Current packs/day: 0.00     Average packs/day: 2.0 packs/day for 19.0 years (38.0 ttl pk-yrs)     Types: Cigarettes     Start date:      Quit date:      Years since quittin.9    Smokeless tobacco: Former     Types: Chew     Quit date:    Vaping Use    Vaping status: Never Used   Substance and Sexual Activity     Alcohol use: No    Drug use: No    Sexual activity: Defer       Family Hx:  Family History   Problem Relation Age of Onset    Cancer Mother         breast    Heart attack Mother     Hypertension Father     Breast cancer Sister     Breast cancer Sister     Breast cancer Sister     No Known Problems Sister     No Known Problems Brother        Review of Imaging (Interpretation of images not reports): The CT angiogram of the head and neck with contrast was reviewed in details as follows:    CT Angiography Head With Intravenous Contrast     EXAM DATE:    12/18/2024 3:51 PM     CLINICAL HISTORY:    Stroke, follow up     TECHNIQUE:    Axial computed tomographic angiography images of the head with  intravenous contrast. LVO analysis was performed with [employer] software.   This CT exam was performed using one or more of the following dose  reduction techniques:  automated exposure control, adjustment of the mA  and/or kV according to patient size, and/or use of iterative  reconstruction technique.    MIP reconstructed images were created and reviewed.     COMPARISON:    No relevant prior studies available.     FINDINGS:    RIGHT INTERNAL CAROTID ARTERY:  No acute findings.  Intracranial  segment is patent with no significant stenosis.  No aneurysm.    RIGHT ANTERIOR CEREBRAL ARTERY:  Unremarkable.  No occlusion or  significant stenosis.  No aneurysm.    RIGHT MIDDLE CEREBRAL ARTERY:  Unremarkable.  No occlusion or  significant stenosis.  No aneurysm.    RIGHT POSTERIOR CEREBRAL ARTERY:  Unremarkable.  No occlusion or  significant stenosis.  No aneurysm.    RIGHT VERTEBRAL ARTERY:  Unremarkable as visualized.       LEFT INTERNAL CAROTID ARTERY:  No acute findings.  Intracranial  segment is patent with no significant stenosis.  No aneurysm.    LEFT ANTERIOR CEREBRAL ARTERY:  Unremarkable.  No occlusion or  significant stenosis.  No aneurysm.    LEFT MIDDLE CEREBRAL ARTERY:  Unremarkable.  No occlusion or  significant stenosis.   No aneurysm.    LEFT POSTERIOR CEREBRAL ARTERY:  Unremarkable.  No occlusion or  significant stenosis.  No aneurysm.    LEFT VERTEBRAL ARTERY:  Unremarkable as visualized.       BASILAR ARTERY:  Unremarkable.  No occlusion or significant stenosis.   No aneurysm.    OTHER FINDINGS:  No hemodynamically significant stenosis or aneurysm  identified.     IMPRESSION:    No hemodynamically significant stenosis or aneurysm identified.    CT Head Without Contrast Stroke Protocol    Result Date: 12/18/2024  CT HEAD WO CONTRAST STROKE PROTOCOL-  CLINICAL INDICATION: Neuro deficit, acute, stroke suspected   COMPARISON: 10/28/2016  TECHNIQUE: Axial images of the brain were obtained with out intravenous contrast.  Reformatted images were created in the sagittal and coronal planes.  DOSE:  Radiation dose reduction techniques were utilized per ALARA protocol. Automated exposure control was initiated through either or [employer] or [employer] software packages by  protocol.    FINDINGS:   BRAIN:  Unremarkable.  No hemorrhage.  No significant white matter disease.  No edema.    VENTRICLES:  Unremarkable.  No ventriculomegaly.    BONES/JOINTS:  Unremarkable.  No acute fracture.    SOFT TISSUES:  Unremarkable.    SINUSES:  no air fluid levels    MASTOID AIR CELLS:  Unremarkable as visualized.  No mastoid effusion.       Impression:  1. No evidence of an acute ischemic event 2. No parenchymal mass, hemorrhage, or midline shift 3. Results were relayed to the emergency department at 3:29 p.m.  This report was finalized on 12/18/2024 3:30 PM by Dr. Milad Wong MD.              Additional Tests Performed: Reviewed the CT perfusion which is not showing any mismatch.    Results for orders placed during the hospital encounter of 09/20/18    Adult Transthoracic Echo Limited W/ Cont if Necessary Per Protocol    Interpretation Summary  · Mild mitral valve regurgitation is present.  · Mild aortic valve regurgitation is present.  · The  following left ventricular wall segments are hypokinetic: mid anterior, apical anterior, basal anterolateral, mid anterolateral, apical lateral, basal inferolateral, mid inferolateral, apical inferior, mid inferior, basal inferoseptal, basal anterior, basal inferior and basal inferoseptal. The following left ventricular wall segments are dyskinetic: apical septal, mid inferoseptal, mid anteroseptal and apex.  · The left ventricular cavity is mild-to-moderately dilated.  · Left ventricular systolic function is severely decreased. Estimated EF = 25%.  · Left atrial cavity size is mild-to-moderately dilated.  · There is no evidence of pericardial effusion.  · No evidence of pulmonary hypertension is present.  · Mild MAC is present.  · The aortic valve exhibits sclerosis.  · There is no evidence of a left ventricular mass or thrombus present.  · Saline test for shunting not performed.            Laboratory Results:   Lab Results   Component Value Date    GLUCOSE 251 (H) 12/18/2024    CALCIUM 9.3 12/18/2024     (L) 12/18/2024    K 4.3 12/18/2024    CO2 25.5 12/18/2024    CL 96 (L) 12/18/2024    BUN 17 12/18/2024    CREATININE 0.97 12/18/2024    EGFRIFNONA 73 09/16/2018    BCR 17.5 12/18/2024    ANIONGAP 11.5 12/18/2024     Lab Results   Component Value Date    WBC 5.69 12/18/2024    HGB 11.9 (L) 12/18/2024    HCT 39.0 12/18/2024    MCV 99.7 (H) 12/18/2024    PLT 94 (L) 12/18/2024     Lab Results   Component Value Date    CHOL 112 10/30/2016     Lab Results   Component Value Date    HDL 37 (L) 11/19/2024    HDL 25 (L) 10/30/2016     Lab Results   Component Value Date     (H) 11/19/2024    LDL 68 10/30/2016     Lab Results   Component Value Date    TRIG 127 11/19/2024    TRIG 95 10/30/2016     Lab Results   Component Value Date    HGBA1C 9.50 (H) 11/19/2024     Lab Results   Component Value Date    INR 0.95 12/18/2024    INR 0.96 09/16/2018    PROTIME 12.7 12/18/2024    PROTIME 10.1 09/16/2018     Lab Results  "  Component Value Date    QXNWGUKD47 458 10/30/2016     Lab Results   Component Value Date    FOLATE 18.00 10/30/2016     TSH          12/18/2024    15:36   TSH   TSH 0.569           Physical Examination:  /82   Pulse 80   Temp 98.8 °F (37.1 °C) (Oral)   Resp 16   Ht 177.8 cm (70\")   Wt 82.8 kg (182 lb 9.6 oz)   SpO2 92%   BMI 26.20 kg/m²   General Appearance:   Well developed, well nourished, well groomed, alert, and cooperative.  HEENT: Normocephalic.    Neck and Spine: Normal range of motion.  Normal alignment. No mass or tenderness. No bruits.    Extremities:    No edema or deformities. Normal joint ROM.   Skin:    No rashes or birth marks.    Neurological examination:  Higher Integrative  Function: Oriented to time, place and person.  Although he was not able to tell me the correct year he was able to follow one-step and two-step commands and read aloud common words and phrases and also recognize common pictures and a complex picture without much problems..  CN II:  Pupils are equal, round, and reactive to light. Normal visual acuity and visual fields.    CN III IV VI: Extraocular movements are full without nystagmus.   CN V:  Normal facial sensation and strength of muscles of mastication.  CN VII:  Facial movements are symmetric. No weakness.  CN VIII: Auditory acuity is normal.  CN IX & X: Symmetric palatal movement.  CN XI:  Sternocleidomastoid and trapezius are normal.  No weakness.  CN XII:  The tongue is midline.  No atrophy or fasciculations.  Motor:  Normal muscle strength, bulk and tone in upper and lower extremities.  No fasciculations, rigidity, spasticity, or abnormal movements.  Sensation: Normal to light touch, pinprick, vibration, temperature, and proprioception in arms and legs. Normal graphesthesia and no extinction on DSS.  Station and Gait: He was able to sit up by the side of the bed independently and stand up with help because he has a chronic low back pain issue for which he " usually uses a cane or a walker at home..    Coordination:  Finger to nose test shows no dysmetria. Heel to shin normal.    NIHSS:    Baseline  0-->Alert: keenly responsive  0-->Answers both questions correctly  0-->Performs both tasks correctly  0=normal  0=No visual loss  0=Normal symmetric movement  0-->No drift: limb holds 90 (or 45) degrees for full 10 secs  0-->No drift: limb holds 90 (or 45) degrees for full 10 secs  0-->No drift: limb holds 90 (or 45) degrees for full 10 secs  0-->No drift: limb holds 90 (or 45) degrees for full 10 secs  0=Absent  0=Normal; no sensory loss  0=No aphasia, normal  0=Normal  0=No abnormality    Total score: 0    Diagnoses / Discussion:  84 y.o. who presents with Sx of confusion which could be postictal as he was staring off into space.  Patient has been on tramadol at bedtime for quite some time but this can lower seizure threshold.    Plan:  Continue his home medications as before except for the metformin which has to be held for at least 48 hours after administration of intravenous contrast iodinated dye.  Patient will be unable to get an MRI because he has got an AICD placed in 2018.  Transthoracic echocardiogram with bubble study for tomorrow to look for any PFO as well as ejection fraction as the previous 1 in 2018 was only 25%.  Carotid ultrasound to look for any soft plaques in the internal carotid arteries.  EEG with photic stimulation and hyperventilation to look for any unusual slowing in the left temporal area  Lipid profile, TSH, sed rate, hemoglobin A1c, vitamin B12, PSA and folic acid levels for tomorrow morning.  Have physical therapy/Occupational Therapy/speech pathology evaluate him to make sure he would be safe to go home independently with his family.  Gently hydrate the patient.  Because he passed his bedside swallow he should be on a carbohydrate controlled cardiac diet.  Keep the systolic blood pressure between 1 20-1 40 and the diastolic between 70-80..      I have discussed the above with the patient, Dr. Howe the emergency room physician and family.  He will be followed up tomorrow by inpatient teleneurology service along with [facility] Daugherty  Time spent with patient: 70 minutes in face-to-face evaluation and management of the patient using the dedicated telemedicine device without any interruption with the help of the emergency room nurse with the patient located at the [facility] and myself at a remote location.    Electronically signed by Yan Haney MD, 12/18/24, 5:20 PM EST.    Dictated using Dragon dictation.

## 2024-12-19 ENCOUNTER — APPOINTMENT (OUTPATIENT)
Dept: CARDIOLOGY | Facility: HOSPITAL | Age: 84
End: 2024-12-19
Payer: MEDICARE

## 2024-12-19 ENCOUNTER — APPOINTMENT (OUTPATIENT)
Dept: ULTRASOUND IMAGING | Facility: HOSPITAL | Age: 84
End: 2024-12-19
Payer: MEDICARE

## 2024-12-19 ENCOUNTER — READMISSION MANAGEMENT (OUTPATIENT)
Dept: CALL CENTER | Facility: HOSPITAL | Age: 84
End: 2024-12-19
Payer: MEDICARE

## 2024-12-19 ENCOUNTER — APPOINTMENT (OUTPATIENT)
Dept: RESPIRATORY THERAPY | Facility: HOSPITAL | Age: 84
End: 2024-12-19
Payer: MEDICARE

## 2024-12-19 VITALS
HEART RATE: 73 BPM | SYSTOLIC BLOOD PRESSURE: 146 MMHG | OXYGEN SATURATION: 97 % | BODY MASS INDEX: 26.38 KG/M2 | HEIGHT: 69 IN | TEMPERATURE: 97.7 F | WEIGHT: 178.1 LBS | RESPIRATION RATE: 18 BRPM | DIASTOLIC BLOOD PRESSURE: 70 MMHG

## 2024-12-19 LAB
ABO GROUP BLD: NORMAL
ANION GAP SERPL CALCULATED.3IONS-SCNC: 13.1 MMOL/L (ref 5–15)
AV MEAN PRESS GRAD SYS DOP V1V2: 3 MMHG
AV VMAX SYS DOP: 116 CM/SEC
BH CV ECHO MEAS - ACS: 2.1 CM
BH CV ECHO MEAS - AO MAX PG: 5.4 MMHG
BH CV ECHO MEAS - AO ROOT DIAM: 4.3 CM
BH CV ECHO MEAS - AO V2 VTI: 21.4 CM
BH CV ECHO MEAS - AVA(I,D): 3.2 CM2
BH CV ECHO MEAS - EDV(CUBED): 89.3 ML
BH CV ECHO MEAS - EDV(MOD-SP4): 78.4 ML
BH CV ECHO MEAS - EF(MOD-SP4): 37.8 %
BH CV ECHO MEAS - ESV(CUBED): 35.9 ML
BH CV ECHO MEAS - ESV(MOD-SP4): 48.8 ML
BH CV ECHO MEAS - FS: 26.2 %
BH CV ECHO MEAS - IVS/LVPW: 1.04 CM
BH CV ECHO MEAS - IVSD: 1.19 CM
BH CV ECHO MEAS - LA DIMENSION: 3.5 CM
BH CV ECHO MEAS - LV DIASTOLIC VOL/BSA (35-75): 39.9 CM2
BH CV ECHO MEAS - LV MASS(C)D: 187.9 GRAMS
BH CV ECHO MEAS - LV MAX PG: 2.9 MMHG
BH CV ECHO MEAS - LV MEAN PG: 1 MMHG
BH CV ECHO MEAS - LV SYSTOLIC VOL/BSA (12-30): 24.8 CM2
BH CV ECHO MEAS - LV V1 MAX: 84.8 CM/SEC
BH CV ECHO MEAS - LV V1 VTI: 16.3 CM
BH CV ECHO MEAS - LVIDD: 4.5 CM
BH CV ECHO MEAS - LVIDS: 3.3 CM
BH CV ECHO MEAS - LVOT AREA: 4.2 CM2
BH CV ECHO MEAS - LVOT DIAM: 2.3 CM
BH CV ECHO MEAS - LVPWD: 1.14 CM
BH CV ECHO MEAS - MV A MAX VEL: 70.6 CM/SEC
BH CV ECHO MEAS - MV DEC SLOPE: 135 CM/SEC2
BH CV ECHO MEAS - MV DEC TIME: 0.3 SEC
BH CV ECHO MEAS - MV E MAX VEL: 40.3 CM/SEC
BH CV ECHO MEAS - MV E/A: 0.57
BH CV ECHO MEAS - PA ACC TIME: 0.09 SEC
BH CV ECHO MEAS - PI END-D VEL: 84.2 CM/SEC
BH CV ECHO MEAS - RAP SYSTOLE: 10 MMHG
BH CV ECHO MEAS - RVSP: 28.1 MMHG
BH CV ECHO MEAS - SV(LVOT): 67.7 ML
BH CV ECHO MEAS - SV(MOD-SP4): 29.6 ML
BH CV ECHO MEAS - SVI(LVOT): 34.4 ML/M2
BH CV ECHO MEAS - SVI(MOD-SP4): 15.1 ML/M2
BH CV ECHO MEAS - TAPSE (>1.6): 1.43 CM
BH CV ECHO MEAS - TR MAX PG: 18.1 MMHG
BH CV ECHO MEAS - TR MAX VEL: 213 CM/SEC
BUN SERPL-MCNC: 17 MG/DL (ref 8–23)
BUN/CREAT SERPL: 18.5 (ref 7–25)
CALCIUM SPEC-SCNC: 9.2 MG/DL (ref 8.6–10.5)
CHLORIDE SERPL-SCNC: 95 MMOL/L (ref 98–107)
CHOLEST SERPL-MCNC: 145 MG/DL (ref 0–200)
CO2 SERPL-SCNC: 22.9 MMOL/L (ref 22–29)
CREAT SERPL-MCNC: 0.92 MG/DL (ref 0.76–1.27)
EGFRCR SERPLBLD CKD-EPI 2021: 83.1 ML/MIN/1.73
ERYTHROCYTE [SEDIMENTATION RATE] IN BLOOD: 20 MM/HR (ref 0–20)
FOLATE SERPL-MCNC: 10.7 NG/ML (ref 4.78–24.2)
GLUCOSE BLDC GLUCOMTR-MCNC: 167 MG/DL (ref 70–130)
GLUCOSE BLDC GLUCOMTR-MCNC: 181 MG/DL (ref 70–130)
GLUCOSE BLDC GLUCOMTR-MCNC: 356 MG/DL (ref 70–130)
GLUCOSE SERPL-MCNC: 196 MG/DL (ref 65–99)
HDLC SERPL-MCNC: 39 MG/DL (ref 40–60)
LDLC SERPL CALC-MCNC: 89 MG/DL (ref 0–100)
LDLC/HDLC SERPL: 2.27 {RATIO}
LEFT ATRIUM VOLUME INDEX: 25.6 ML/M2
POTASSIUM SERPL-SCNC: 3.6 MMOL/L (ref 3.5–5.2)
PSA SERPL-MCNC: <0.014 NG/ML (ref 0–4)
RH BLD: POSITIVE
SODIUM SERPL-SCNC: 131 MMOL/L (ref 136–145)
TRIGL SERPL-MCNC: 88 MG/DL (ref 0–150)
VIT B12 BLD-MCNC: 354 PG/ML (ref 211–946)
VLDLC SERPL-MCNC: 17 MG/DL (ref 5–40)

## 2024-12-19 PROCEDURE — 93880 EXTRACRANIAL BILAT STUDY: CPT | Performed by: RADIOLOGY

## 2024-12-19 PROCEDURE — 80061 LIPID PANEL: CPT | Performed by: PSYCHIATRY & NEUROLOGY

## 2024-12-19 PROCEDURE — 99213 OFFICE O/P EST LOW 20 MIN: CPT | Performed by: NURSE PRACTITIONER

## 2024-12-19 PROCEDURE — 86901 BLOOD TYPING SEROLOGIC RH(D): CPT

## 2024-12-19 PROCEDURE — A9270 NON-COVERED ITEM OR SERVICE: HCPCS | Performed by: INTERNAL MEDICINE

## 2024-12-19 PROCEDURE — 92610 EVALUATE SWALLOWING FUNCTION: CPT

## 2024-12-19 PROCEDURE — 82746 ASSAY OF FOLIC ACID SERUM: CPT | Performed by: PSYCHIATRY & NEUROLOGY

## 2024-12-19 PROCEDURE — 99239 HOSP IP/OBS DSCHRG MGMT >30: CPT | Performed by: INTERNAL MEDICINE

## 2024-12-19 PROCEDURE — 95819 EEG AWAKE AND ASLEEP: CPT

## 2024-12-19 PROCEDURE — 95819 EEG AWAKE AND ASLEEP: CPT | Performed by: PSYCHIATRY & NEUROLOGY

## 2024-12-19 PROCEDURE — G0378 HOSPITAL OBSERVATION PER HR: HCPCS

## 2024-12-19 PROCEDURE — 80048 BASIC METABOLIC PNL TOTAL CA: CPT | Performed by: INTERNAL MEDICINE

## 2024-12-19 PROCEDURE — 82607 VITAMIN B-12: CPT | Performed by: PSYCHIATRY & NEUROLOGY

## 2024-12-19 PROCEDURE — 85652 RBC SED RATE AUTOMATED: CPT | Performed by: PSYCHIATRY & NEUROLOGY

## 2024-12-19 PROCEDURE — 93306 TTE W/DOPPLER COMPLETE: CPT | Performed by: INTERNAL MEDICINE

## 2024-12-19 PROCEDURE — 86900 BLOOD TYPING SEROLOGIC ABO: CPT

## 2024-12-19 PROCEDURE — 93306 TTE W/DOPPLER COMPLETE: CPT

## 2024-12-19 PROCEDURE — 97165 OT EVAL LOW COMPLEX 30 MIN: CPT

## 2024-12-19 PROCEDURE — 92523 SPEECH SOUND LANG COMPREHEN: CPT

## 2024-12-19 PROCEDURE — 63710000001 INSULIN LISPRO (HUMAN) PER 5 UNITS: Performed by: INTERNAL MEDICINE

## 2024-12-19 PROCEDURE — 84153 ASSAY OF PSA TOTAL: CPT | Performed by: PSYCHIATRY & NEUROLOGY

## 2024-12-19 PROCEDURE — 36415 COLL VENOUS BLD VENIPUNCTURE: CPT | Performed by: PSYCHIATRY & NEUROLOGY

## 2024-12-19 PROCEDURE — 93880 EXTRACRANIAL BILAT STUDY: CPT

## 2024-12-19 PROCEDURE — 97162 PT EVAL MOD COMPLEX 30 MIN: CPT

## 2024-12-19 PROCEDURE — 82948 REAGENT STRIP/BLOOD GLUCOSE: CPT

## 2024-12-19 RX ADMIN — PANTOPRAZOLE SODIUM 40 MG: 40 TABLET, DELAYED RELEASE ORAL at 06:24

## 2024-12-19 RX ADMIN — BICALUTAMIDE 50 MG: 50 TABLET, FILM COATED ORAL at 09:59

## 2024-12-19 RX ADMIN — Medication 10 ML: at 09:59

## 2024-12-19 RX ADMIN — INSULIN LISPRO 6 UNITS: 100 INJECTION, SOLUTION INTRAVENOUS; SUBCUTANEOUS at 12:49

## 2024-12-19 RX ADMIN — CARVEDILOL 6.25 MG: 6.25 TABLET, FILM COATED ORAL at 09:59

## 2024-12-19 RX ADMIN — ASPIRIN 81 MG: 81 TABLET, COATED ORAL at 09:59

## 2024-12-19 RX ADMIN — INSULIN LISPRO 2 UNITS: 100 INJECTION, SOLUTION INTRAVENOUS; SUBCUTANEOUS at 09:59

## 2024-12-19 RX ADMIN — NAPROXEN 250 MG: 250 TABLET ORAL at 09:59

## 2024-12-19 NOTE — THERAPY EVALUATION
Patient Name: Teddy De Leon  : 1940    MRN: [id number]                              Today's Date: 2024       Admit Date: 2024    Visit Dx:     ICD-10-CM ICD-9-CM   1. Altered mental status, unspecified altered mental status type  R41.82 780.97   2. 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    Ischemic cardiomyopathy, with LV ejection fraction of about 25% on recent echo Doppler study in 2017, compensated.    Type 2 diabetes mellitus with hypoglycemia, with long-term current use of insulin    Mixed hyperlipidemia    Essential hypertension    Back pain    Arrhythmia    Prostate cancer, status post radiation therapy and is currently on long-term chemotherapy.    Iron deficiency anemia    Sternal fracture    LBBB (left bundle branch block)    Cardiac resynchronization therapy defibrillator (CRT-D) in place    Chronic systolic congestive heart failure    Encephalopathy acute     Past Medical History:   Diagnosis Date    Arthritis     sites varies     Coronary artery disease     stent inserted by Dr Palencia     Diabetes mellitus     insulin and po meds     GERD (gastroesophageal reflux disease)     Hyperlipidemia     Injury of back     Iron deficiency anemia 10/31/2016    Myocardial infarction 1998    Prostate cancer     radiation treatment -no surgery     Wears glasses      Past Surgical History:   Procedure Laterality Date    CARDIAC CATHETERIZATION      CARDIAC ELECTROPHYSIOLOGY PROCEDURE N/A 2018    Procedure: Implant ICD - bi ventricular;  Surgeon: Nas Quevedo DO;  Location: [facility] INVASIVE LOCATION;  Service: Cardiology    CATARACT EXTRACTION, BILATERAL      2024 and May 2024    COLONOSCOPY      CORONARY STENT PLACEMENT      FRACTURE SURGERY  Right     rt arm repair     SKIN BIOPSY  1993    nose -small area      General Information       Row Name 12/19/24 1332          OT Time and Intention    Subjective Information no complaints  -LM     Document Type evaluation  -LM     Mode of Treatment occupational therapy  -LM     Patient Effort good  -LM       Row Name 12/19/24 1332          General Information    Patient Profile Reviewed yes  -LM     Prior Level of Function independent:;ADL's;transfer;all household mobility  cane  -LM     Existing Precautions/Restrictions fall  -LM     Barriers to Rehab medically complex  -LM       Row Name 12/19/24 1332          Living Environment    People in Home spouse  -LM       Row Name 12/19/24 1332          Cognition    Orientation Status (Cognition) oriented x 3  -LM       Row Name 12/19/24 1332          Safety Issues/Impairments Affecting Functional Mobility    Impairments Affecting Function (Mobility) balance;endurance/activity tolerance;range of motion (ROM);strength  -LM               User Key  (r) = Recorded By, (t) = Taken By, (c) = Cosigned By      Initials Name Provider Type    LM Ragini Christianson, OT Occupational Therapist                Mild decreased cognition, problem-solving, decreased insight.  Recommend supervision for BADL and fxl mobility     Mobility/ADL's       Row Name 12/19/24 1339          Transfers    Transfers toilet transfer  -[facility] Name 12/19/24 1337          Toilet Transfer    Type (Toilet Transfer) stand pivot/stand step  -LM     [place] Level (Toilet Transfer) standby assist;supervision  -     Assistive Device (Toilet Transfer) cane, straight  -       Row Name 12/19/24 1334          Activities of Daily Living    BADL Assessment/Intervention bathing;upper body dressing;lower body dressing;grooming;feeding;toileting  -LM       Row Name 12/19/24 1330          Bathing Assessment/Intervention    [place] Level (Bathing) standby assist;supervision  -[facility] Name 12/19/24 1336           Upper Body Dressing Assessment/Training    [place] Level (Upper Body Dressing) set up  -LM       Row Name 12/19/24 1339          Lower Body Dressing Assessment/Training    [place] Level (Lower Body Dressing) supervision  -LM     Position (Lower Body Dressing) edge of bed sitting  -LM       Row Name 12/19/24 1339          Grooming Assessment/Training    [place] Level (Grooming) set up  -LM       Row Name 12/19/24 1339          Self-Feeding Assessment/Training    [place] Level (Feeding) set up  -LM       Row Name 12/19/24 1339          Toileting Assessment/Training    [place] Level (Toileting) set up;standby assist  -LM               User Key  (r) = Recorded By, (t) = Taken By, (c) = Cosigned By      Initials Name Provider Type    LM Ragini Christianson, OT Occupational Therapist                   Obj/Interventions       Row Name 12/19/24 1342          Sensory Assessment (Somatosensory)    Sensory Assessment (Somatosensory) sensation intact  -LM       Row Name 12/19/24 1342          Vision Assessment/Intervention    Visual Impairment/Limitations WFL  -LM       Row Name 12/19/24 1342          Range of Motion Comprehensive    General Range of Motion upper extremity range of motion deficits identified  -LM     Comment, General Range of Motion old RUE childhood elbow fracture with arom limitations  -LM       Row Name 12/19/24 1342          Strength Comprehensive (MMT)    General Manual Muscle Testing (MMT) Assessment upper extremity strength deficits identified  -LM     Comment, General Manual Muscle Testing (MMT) Assessment RUE 3-/5  -LM       Row Name 12/19/24 1342          Motor Skills    Motor Skills functional endurance  -LM     Functional Endurance F  -LM               User Key  (r) = Recorded By, (t) = Taken By, (c) = Cosigned By      Initials Name Provider Type    Ragini Chavez, OT Occupational Therapist                   Goals/Plan       Row Name 12/19/24 1345          Transfer  Goal 1 (OT)    Activity/Assistive Device (Transfer Goal 1, OT) transfers, all  -LM     [place] Level/Cues Needed (Transfer Goal 1, OT) modified independence  -LM     Time Frame (Transfer Goal 1, OT) by discharge  -LM       Row Name 12/19/24 1345          Therapy Assessment/Plan (OT)    Planned Therapy Interventions (OT) activity tolerance training;adaptive equipment training;strengthening exercise;patient/caregiver education/training;BADL retraining  -LM               User Key  (r) = Recorded By, (t) = Taken By, (c) = Cosigned By      Initials Name Provider Type    LM Ragini Christianson, OT Occupational Therapist                   Clinical Impression       Row Name 12/19/24 1344          Plan of Care Review    Plan of Care Reviewed With patient  -LM       Row Name 12/19/24 1344          Therapy Assessment/Plan (OT)    Patient/Family Therapy Goal Statement (OT) return home  -LM     Rehab Potential (OT) good  -LM     Criteria for Skilled Therapeutic Interventions Met (OT) yes;meets criteria;skilled treatment is necessary  -LM     Therapy Frequency (OT) other (see comments)  prn and/or to monitor fxl progress  -LM       Row Name 12/19/24 1344          Therapy Plan Review/Discharge Plan (OT)    Anticipated Discharge Disposition (OT) home with assist;home with 24/7 care  -LM       Row Name 12/19/24 1342          Positioning and Restraints    Post Treatment Position bed  -LM     In Bed call light within reach;encouraged to call for assist;patient within staff view;with family/caregiver;sitting EOB  -LM               User Key  (r) = Recorded By, (t) = Taken By, (c) = Cosigned By      Initials Name Provider Type    Ragini Chavez, OT Occupational Therapist                   Outcome Measures       Row Name 12/19/24 4342          How much help from another person do you currently need...    Turning from your back to your side while in flat bed without using bedrails? 2  -SB     Moving from lying on back to sitting on the  side of a flat bed without bedrails? 2  -SB     Moving to and from a bed to a chair (including a wheelchair)? 2  -SB     Standing up from a chair using your arms (e.g., wheelchair, bedside chair)? 2  -SB     Climbing 3-5 steps with a railing? 2  -SB     To walk in hospital room? 2  -SB     AM-PAC 6 Clicks Score (PT) 12  -SB               User Key  (r) = Recorded By, (t) = Taken By, (c) = Cosigned By      Initials Name Provider Type    SB Orlando Mirza, RN Registered Nurse                    Occupational Therapy Education        No education to display                  OT Recommendation and Plan  Planned Therapy Interventions (OT): activity tolerance training, adaptive equipment training, strengthening exercise, patient/caregiver education/training, BADL retraining  Therapy Frequency (OT): other (see comments) (prn and/or to monitor fxl progress)  Plan of Care Review  Plan of Care Reviewed With: patient     Time Calculation:          Therapy Charges for Today       Code Description Service Date Service Provider Modifiers Qty    [id number]  OT EVAL LOW COMPLEXITY 4 12/19/2024 Ragini Christianson, OT GO 1                 Ragini Christianson OT  12/19/2024

## 2024-12-19 NOTE — PAYOR COMM NOTE
"CONTACT:  DELORES KAPLAN RN  UTILIZATION MANAGEMENT DEPT.   [facility]   [address] [facility], [address]   PHONE:  [phone]   FAX: [phone]           CHANGED TO OBSERVATION PRIOR TO DC 12/19/2024-----NO AUTH NEEDED                  Monique De Leon (84 y.o. Male)       Date of Birth   1940    Social Security Number       Address   [address] [place] [id number]    Home Phone       MRN   [id number]       [employer]   Amish    Marital Status                               Admission Date   12/18/24    Admission Type   Emergency    Admitting Provider   Behbahani, Katayoun, MD    Attending Provider   Behbahani, Katayoun, MD    Department, Room/Bed   [address], 3311/1S       Discharge Date       Discharge Disposition   Home or Self Care    Discharge Destination                                 Attending Provider: Behbahani, Katayoun, MD    Allergies: No Known Allergies    Isolation: None   Infection: None   Code Status: CPR    Ht: 175.3 cm (69\")   Wt: 80.8 kg (178 lb 1.6 oz)    Admission Cmt: None   Principal Problem: Encephalopathy acute [G93.40]                   Active Insurance as of 12/18/2024       Primary Coverage       Payor Plan Insurance Group Employer/Plan Group    HUMANA MEDICARE REPLACEMENT HUMANA MED ADV SNP HMO [id number]       Payor Plan Address Payor Plan Phone Number Payor Plan Fax Number Effective Dates    [address] [address] [phone]  1/1/2024 - None Entered    [facility] [id number]         Subscriber Name Subscriber Birth Date Member ID       MONIQUE DE LEON 1940 [id number]                     Emergency Contacts        (Rel.) Home Phone Work Phone Mobile Phone    OTFCRYSTAL NOEMY (Spouse) [phone] -- [phone]    PRADEEP HONG (Daughter) -- -- [phone]    MNOIQUE D ELEON JR (Son) -- -- [phone]              Orders (last 24 hrs)        Start     Ordered    12/19/24 1222  Discontinue IV  Once         " 12/19/24 1222    12/19/24 1214  Initiate Observation Status  Once        Comments: From time of admission    12/19/24 1214    12/19/24 1211  Discontinue IV  Once         12/19/24 1216    12/19/24 1210  Discharge patient  Once         12/19/24 1216    12/19/24 1119  POC Glucose Once  PROCEDURE ONCE        Comments: Complete no more than 45 minutes prior to patient eating      12/19/24 1112    12/19/24 1004  SLP Consult: Eval & Treat Other, Communication Disorder, Swallow Disorder; cognition  Once         12/19/24 1003    12/19/24 0942  MRI Brain Without Contrast  1 Time Imaging,   Status:  Canceled        Comments: Has AICD in place. Awaiting for rep and cardiology to assess and clear for MRI    12/19/24 0942    12/19/24 0905  US Carotid Bilateral  Once         12/18/24 1713    12/19/24 0847  Inpatient Cardiology Consult  Once        Specialty:  Cardiology  Provider:  Eugenio Webb MD    12/19/24 0846    12/19/24 0800  Oral Care  2 Times Daily       12/18/24 1944    12/19/24 0729  POC Glucose Once  PROCEDURE ONCE        Comments: Complete no more than 45 minutes prior to patient eating      12/19/24 0722    12/19/24 0700  bicalutamide (CASODEX) tablet 50 mg  Every Morning         12/18/24 1944 12/19/24 0600  Lipid Panel  Morning Draw         12/18/24 1715    12/19/24 0600  Sedimentation Rate  Morning Draw         12/18/24 1715    12/19/24 0600  Vitamin B12  Morning Draw         12/18/24 1715    12/19/24 0600  Folate  Morning Draw         12/18/24 1715    12/19/24 0600  PSA DIAGNOSTIC  Morning Draw         12/18/24 1715    12/19/24 0600  Basic Metabolic Panel  Morning Draw         12/18/24 1944    12/19/24 0600  pantoprazole (PROTONIX) EC tablet 40 mg  Every Early Morning         12/18/24 1944 12/19/24 0000  Adult Transthoracic Echo Complete W/ Cont if Necessary Per Protocol  Once        Comments: With bubble study    12/18/24 1713    12/19/24 0000  Duplex Carotid Ultrasound CAR  Once,   Status:  Canceled          12/18/24 1713    12/19/24 0000  EEG  Once        Comments: May perform bedside photic stimulation and hyperventilation    12/18/24 1716    12/19/24 0000  Discharge Follow-up with Specified Provider: neurology to f/u on TTE w bubble and carotid US results and reassess regarding encephalopathy; 1 Month         12/19/24 1216    12/19/24 0000  Discharge Follow-up with PCP         12/19/24 1216    12/19/24 0000  Activity as Tolerated         12/19/24 1222    12/19/24 0000  Diet: Diabetic Diets; Consistent Carbohydrate; Soft to Chew (NDD 3); Chopped Meat; Thin (IDDSI 0)         12/19/24 1222    12/18/24 2200  POC Glucose 4x Daily Before Meals & at Bedtime  4 Times Daily Before Meals & at Bedtime      Comments: Complete no more than 45 minutes prior to patient eating      12/18/24 1944 12/18/24 2138  POC Glucose Once  PROCEDURE ONCE        Comments: Complete no more than 45 minutes prior to patient eating      12/18/24 2131 12/18/24 2100  sodium chloride 0.9 % flush 10 mL  Every 12 Hours Scheduled         12/18/24 1944 12/18/24 2100  insulin glargine (LANTUS, SEMGLEE) injection 10 Units  Nightly         12/18/24 1944 12/18/24 2100  Insulin Lispro (humaLOG) injection 2-7 Units  4 Times Daily Before Meals & Nightly         12/18/24 1944 12/18/24 2100  Enoxaparin Sodium (LOVENOX) syringe 40 mg  Nightly         12/18/24 1944 12/18/24 2100  atorvastatin (LIPITOR) tablet 40 mg  Nightly         12/18/24 1944 12/18/24 2100  tamsulosin (FLOMAX) 24 hr capsule 0.4 mg  Nightly         12/18/24 1944 12/18/24 2030  aspirin EC tablet 81 mg  Daily         12/18/24 1944 12/18/24 2030  carvedilol (COREG) tablet 6.25 mg  2 Times Daily With Meals         12/18/24 1944 12/18/24 2030  naproxen (NAPROSYN) tablet 250 mg  2 Times Daily With Meals         12/18/24 1944 12/18/24 2000  Vital Signs  Every 4 Hours       12/18/24 1944 12/18/24 2000  Neuro Checks  Every 4 Hours       12/18/24 1944    12/18/24  "2000  Strict Intake & Output  Every Hour       12/18/24 1944 12/18/24 1945  Intake & Output  Every Shift       12/18/24 1944 12/18/24 1945  Weigh Patient  Once         12/18/24 1944 12/18/24 1945  Insert Peripheral IV  Once         12/18/24 1944 12/18/24 1945  Saline Lock & Maintain IV Access  Continuous,   Status:  Canceled         12/18/24 1944 12/18/24 1945  Continuous Cardiac Monitoring  Continuous        Comments: Follow Standing Orders As Outlined in Process Instructions (Open Order Report to View Full Instructions)    12/18/24 1944 12/18/24 1945  Maintain IV Access  Continuous         12/18/24 1944 12/18/24 1945  Telemetry - Place Orders & Notify Provider of Results When Patient Experiences Acute Chest Pain, Dysrhythmia or Respiratory Distress  Continuous        Comments: Open Order Report to View Parameters Requiring Provider Notification    12/18/24 1944 12/18/24 1945  May Be Off Telemetry for Tests  Continuous         12/18/24 1944 12/18/24 1945  Bowel Regimen Not Indicated  Once         12/18/24 1944 12/18/24 1945  Diet: Regular/House, Diabetic; Consistent Carbohydrate; Feeding Assistance - Nursing; Texture: Soft to Chew (NDD 3); Soft to Chew: Chopped Meat; Fluid Consistency: Thin (IDDSI 0)  Diet Effective Now         12/18/24 1944 12/18/24 1945  Seizure Precautions  Continuous         12/18/24 1944 12/18/24 1945  Ambulate Patient  Every Shift       12/18/24 1944 12/18/24 1945  Fall Precautions  Continuous         12/18/24 1944 12/18/24 1945  EEG  Once,   Status:  Canceled         12/18/24 1944 12/18/24 1944  acetaminophen (TYLENOL) tablet 650 mg  Every 4 Hours PRN        Placed in \"Or\" Linked Group    12/18/24 1944 12/18/24 1944  acetaminophen (TYLENOL) 160 MG/5ML oral solution 650 mg  Every 4 Hours PRN        Placed in \"Or\" Linked Group    12/18/24 1944 12/18/24 1944  acetaminophen (TYLENOL) suppository 650 mg  Every 4 Hours PRN        Placed in \"Or\" " Linked Group    12/18/24 1944 12/18/24 1944  dextrose (GLUTOSE) oral gel 15 g  Every 15 Minutes PRN         12/18/24 1944 12/18/24 1944  dextrose (D50W) (25 g/50 mL) IV injection 25 g  Every 15 Minutes PRN         12/18/24 1944    12/18/24 1944  glucagon HCl (Diagnostic) injection 1 mg  Every 15 Minutes PRN         12/18/24 1944 12/18/24 1944  traMADol (ULTRAM) tablet 50 mg  4 Times Daily PRN         12/18/24 1944 12/18/24 1944  sodium chloride 0.9 % flush 10 mL  As Needed         12/18/24 1944 12/18/24 1944  sodium chloride 0.9 % infusion 40 mL  As Needed         12/18/24 1944 12/18/24 1944  nitroglycerin (NITROSTAT) SL tablet 0.4 mg  Every 5 Minutes PRN         12/18/24 1944 12/18/24 1944  ondansetron (ZOFRAN) injection 4 mg  Every 6 Hours PRN         12/18/24 1944 12/18/24 1931  Inpatient Case Management  Consult  Once        Provider:  (Not yet assigned)    12/18/24 1930    12/18/24 1841  Code Status and Medical Interventions: CPR (Attempt to Resuscitate); Full Support  Continuous         12/18/24 1841    12/18/24 1805  PT Consult: Eval & Treat Discharge Placement Assessment, Functional Mobility Below Baseline  Once         12/18/24 1805    12/18/24 1805  OT Consult: Eval & Treat ADL Performance Below Baseline, Discharge Placement Assessment  Once         12/18/24 1805    12/18/24 1805  SLP Consult: Eval & Treat Other, Communication Disorder; cognition  Once,   Status:  Canceled         12/18/24 1805    12/18/24 1757  Call [employer] and have rep present tomorrow for MRI-AICD  Nursing Communication  Continuous        Comments: Call [employer] and have rep present tomorrow for MRI-AICD    12/18/24 1757 12/18/24 1756  ABO RH Specimen Verification  STAT         12/18/24 1755    12/18/24 1755  Inpatient Admission  Once         12/18/24 1804 12/18/24 1721  Diet: Cardiac, Diabetic; Healthy Heart (2-3 Na+); Consistent Carbohydrate; Fluid Consistency: Thin  (IDDSI 0)  Diet Effective Now,   Status:  Canceled         12/18/24 1720    12/18/24 1718  Keep the systolic blood pressure between 1 20-1 40 and the diastolic between 80-90  Nursing Communication  Once        Comments: Keep the systolic blood pressure between 1 20-1 40 and the diastolic between 80-90    12/18/24 1717    12/18/24 1655  Fentanyl, Urine - Urine, Clean Catch  Once         12/18/24 1654    12/18/24 1609  iopamidol (ISOVUE-370) 76 % injection 100 mL  Once in Imaging         12/18/24 1553    12/18/24 1607  POC Creatinine  PROCEDURE ONCE         12/18/24 1534    12/18/24 1600  Neuro Checks  Every 2 Hours      Comments: On arrival and handoff, increase frequency as clinically indicated or for thrombolytic administration, otherwise Q2 hours. Documentation of arrival assessment and any neuro change required.    12/18/24 1517    12/18/24 1554  Type & Screen  Once         12/18/24 1517    12/18/24 1547  aPTT  Once         12/18/24 1517    12/18/24 1524  Blood Gas, Arterial With Co-Ox  PROCEDURE ONCE         12/18/24 1521    12/18/24 1518  Initiate Department's Acute Stroke Process (Team D, Code 19, etc.)  Once         12/18/24 1517    12/18/24 1518  Inpatient Neurology Consult Stroke  Once        Specialty:  Neurology  Provider:  Yan Haney MD    12/18/24 1517    12/18/24 1518  Inpatient Neurology Consult Stroke  Once        Specialty:  Neurology  Provider:  Aldair Madsen APRN    12/18/24 1517    12/18/24 1518  Perform NIH Stroke Scale  Once        Comments: Perform NIH Stroke Scale frequency: Stat, prior to thrombolytic/intervention, repeat as needed per protocol.    12/18/24 1517    12/18/24 1518  Measure Actual Weight  Once         12/18/24 1517    12/18/24 1518  Notify Provider  Continuous        Comments: Open Order Report to View Parameters Requiring Provider Notification    12/18/24 1517    12/18/24 1518  Notify Provider for SBP Greater Than 140 for Hemorrhagic Stroke Patient  Continuous         Comments: Open Order Report to View Parameters Requiring Provider Notification    12/18/24 1517    12/18/24 1518  Head of Bed 30 Degrees or Less  Until Discontinued         12/18/24 1517    12/18/24 1518  Cardiac Monitoring  Continuous,   Status:  Canceled        Comments: Follow Standing Orders As Outlined in Process Instructions (Open Order Report to View Full Instructions)    12/18/24 1517    12/18/24 1518  Undress and Gown  Once         12/18/24 1517    12/18/24 1518  NPO Diet NPO Type: Strict NPO  Diet Effective Now,   Status:  Canceled         12/18/24 1517    12/18/24 1518  Cardiac Monitoring  Continuous,   Status:  Canceled        Comments: Follow Standing Orders As Outlined in Process Instructions (Open Order Report to View Full Instructions)    12/18/24 1517    12/18/24 1518  Continuous Pulse Oximetry  Continuous         12/18/24 1517    12/18/24 1518  Vital Signs  Per Hospital Policy        Comments: Every 30 minutes - Increase frequency as clinically indicated or for thrombolytic administration.    12/18/24 1517    12/18/24 1518  No Hypotonic Fluids  Until Discontinued         12/18/24 1517    12/18/24 1518  Nursing Dysphagia Screening (Complete Prior to Giving anything PO)  Once         12/18/24 1517    12/18/24 1518  RN to Place Order SLP Consult (IF swallow screen failed) - Eval & Treat Choosing Reason of RN Dysphagia Screen Failed  Once         12/18/24 1517    12/18/24 1518  CT Head Without Contrast Stroke Protocol  1 Time Imaging         12/18/24 1517    12/18/24 1518  CT Angiogram Head w AI Analysis of LVO  1 Time Imaging        Comments: Neuro deficit, acute, stroke suspected    12/18/24 1517    12/18/24 1518  CT Angiogram Neck  1 Time Imaging        Comments: Neuro deficit, acute, stroke suspected    12/18/24 1517    12/18/24 1518  CT CEREBRAL PERFUSION WITH & WITHOUT CONTRAST  1 Time Imaging        Comments: Neuro deficit, acute, stroke suspected    12/18/24 1517    12/18/24 1518  XR  Chest 1 View  1 Time Imaging        Comments: Do not delay CT Head to obtain    12/18/24 1517    12/18/24 1518  ECG 12 Lead Stroke Evaluation  Once        Comments: Do not delay CT Head to obtain    12/18/24 1517    12/18/24 1518  Insert Large-Bore Peripheral IV - RIGHT AC Preferred  Once         12/18/24 1517    12/18/24 1518  [place] Draw  Once,   Status:  Canceled         12/18/24 1517    12/18/24 1518  POC Glucose Once  Once,   Status:  Canceled        Comments: Complete no more than 45 minutes prior to patient eating      12/18/24 1517    12/18/24 1518  CBC & Differential  Once,   Status:  Canceled         12/18/24 1517    12/18/24 1518  Comprehensive Metabolic Panel  Once,   Status:  Canceled         12/18/24 1517    12/18/24 1518  Protime-INR  Once,   Status:  Canceled         12/18/24 1517    12/18/24 1518  Green Top (Gel)  PROCEDURE ONCE,   Status:  Canceled         12/18/24 1517    12/18/24 1518  Lavender Top  PROCEDURE ONCE,   Status:  Canceled         12/18/24 1517    12/18/24 1518  Gold Top - SST  PROCEDURE ONCE,   Status:  Canceled         12/18/24 1517    12/18/24 1518  Light Blue Top  PROCEDURE ONCE,   Status:  Canceled         12/18/24 1517    12/18/24 1518  CBC Auto Differential  PROCEDURE ONCE,   Status:  Canceled         12/18/24 1517    12/18/24 1517  sodium chloride 0.9 % flush 10 mL  As Needed         12/18/24 1517    12/18/24 1500  Blood Gas, Arterial -With Co-Ox Panel: Yes  Once         12/18/24 1459    12/18/24 1459  Cardiac Monitoring  Continuous,   Status:  Canceled        Comments: Follow Standing Orders As Outlined in Process Instructions (Open Order Report to View Full Instructions)    12/18/24 1459    12/18/24 1459  Continuous Pulse Oximetry  Continuous,   Status:  Canceled         12/18/24 1459    12/18/24 1459  Vital Signs  Per Hospital Policy         12/18/24 1459    12/18/24 1459  Insert Peripheral IV  Once         12/18/24 1459    12/18/24 1459  Fall Precautions  Continuous          12/18/24 1459    12/18/24 1459  XR Chest 1 View  1 Time Imaging,   Status:  Canceled         12/18/24 1459    12/18/24 1459  POC Glucose Once  Once        Comments: Complete no more than 45 minutes prior to patient eating      12/18/24 1459    12/18/24 1459  [place] Draw  Once         12/18/24 1459    12/18/24 1459  CBC & Differential  Once         12/18/24 1459    12/18/24 1459  Comprehensive Metabolic Panel  Once         12/18/24 1459    12/18/24 1459  Protime-INR  Once         12/18/24 1459    12/18/24 1459  Urinalysis With Culture If Indicated - Urine, Catheter  Once         12/18/24 1459    12/18/24 1459  C-reactive Protein  STAT         12/18/24 1459    12/18/24 1459  Procalcitonin  Once         12/18/24 1459    12/18/24 1459  Urine Drug Screen - Urine, Clean Catch  STAT         12/18/24 1459    12/18/24 1459  TSH  Once         12/18/24 1459    12/18/24 1459  Blood Culture - Blood, Arm, Left  STAT         12/18/24 1459    12/18/24 1459  Blood Culture - Blood, Arm, Right  STAT         12/18/24 1459    12/18/24 1459  Green Top (Gel)  PROCEDURE ONCE         12/18/24 1459    12/18/24 1459  Lavender Top  PROCEDURE ONCE         12/18/24 1459    12/18/24 1459  Gold Top - SST  PROCEDURE ONCE         12/18/24 1459    12/18/24 1459  Light Blue Top  PROCEDURE ONCE         12/18/24 1459    12/18/24 1459  CBC Auto Differential  PROCEDURE ONCE         12/18/24 1459    12/18/24 1458  Oxygen Therapy- Nasal Cannula; Titrate 1-6 LPM Per SpO2; 90 - 95%  Continuous PRN,   Status:  Canceled       12/18/24 1459    12/18/24 1458  sodium chloride 0.9 % flush 10 mL  As Needed,   Status:  Discontinued         12/18/24 1459    Unscheduled  Oxygen Therapy- Nasal Cannula; Titrate 1-6 LPM Per SpO2; 90 - 95%  Continuous PRN       12/18/24 1517    Unscheduled  Follow Hypoglycemia Standing Orders For Blood Glucose <70 & Notify Provider of Treatment  As Needed      Comments: Follow Hypoglycemia Orders As Outlined in Process Instructions  (Open Order Report to View Full Instructions)  Notify Provider Any Time Hypoglycemia Treatment is Administered    24    --  carvedilol (COREG) 6.25 MG tablet  2 Times Daily With Meals         24    --  nabumetone (RELAFEN) 500 MG tablet  2 Times Daily         24                  Physician Progress Notes (last 24 hours)  Notes from 24 1339 through 24 1339   No notes of this type exist for this encounter.          Consult Notes (last 24 hours)        Yan Haney MD at 24 1720        Consult Orders    1. Inpatient Neurology Consult Stroke [[id number]] ordered by Richmond Howe DO at 24 1517    2. Inpatient Neurology Consult Stroke [[id number]] ordered by Richmond Howe DO at 24 1517                 DOS: 2024  NAME: Teddy De Leon   : 1940  PCP: Roman Lebron DO  CC: Altered mentation since 9 AM  Referring MD: Richmond Howe DO    Neurological Problem and Interval History:  84 y.o. right-handed white male with a Hx of prostate cancer for which she had received radiation therapy in the last  and  and has been declared cancer free was found around 9 AM to be staring at his wife and not verbally communicating.  She then woke him up and he got up and he wanted to use the bathroom and at that point it was noticeable he just urinated on the floor without being able to reach the bathroom.  There was no tonic-clonic convulsions noted or any kind of tongue bite but he seemed to be staring off into space.  He has been on tramadol for quite some time which can lower seizure threshold but he has not had any kind of seizures in the past.  He usually takes a tramadol at bedtime.  When I saw him through the telemedicine device and noticed he is awake and alert and oriented to himself and knows his date of birth and knows his wife at the bedside and knows where he is and upon cueing he was able to tell me the correct  month but he could not tell me the correct year.  He was able to follow one-step and two-step commands well.  He was able to read aloud most of the words and phrases presented to him and also recognize most of the pictures and a complex picture.  There is no facial droop noticeable and his speech is clear and intact.  There is no visual loss noted but he usually uses corrective lenses.  He is hard of hearing.  There is no drift noticeable in the upper or lower extremities and the finger-to-nose coordination and heel-to-shin testing are all intact.  There is no loss of sensations on either side of the face or the upper and lower extremities and no extinction on double simultaneous presentation of noxious stimulation.  He was able to sit up by the side of the bed independently and stood up with some assistance as he usually walks with a cane or a walker because of back injury.  He passed bedside swallow.    Past Medical/Surgical Hx:  Past Medical History:   Diagnosis Date    Arthritis     sites varies     Coronary artery disease 1998    stent inserted by Dr Palencia     Diabetes mellitus     insulin and po meds     GERD (gastroesophageal reflux disease)     Hyperlipidemia     Injury of back     Iron deficiency anemia 10/31/2016    Myocardial infarction 1998    Prostate cancer     radiation treatment -no surgery     Wears glasses      Past Surgical History:   Procedure Laterality Date    CARDIAC CATHETERIZATION      CARDIAC ELECTROPHYSIOLOGY PROCEDURE N/A 09/20/2018    Procedure: Implant ICD - bi ventricular;  Surgeon: Nas Quevedo DO;  Location: [facility] INVASIVE LOCATION;  Service: Cardiology    CATARACT EXTRACTION, BILATERAL      April 2024 and May 2024    COLONOSCOPY      CORONARY STENT PLACEMENT      FRACTURE SURGERY Right     rt arm repair     SKIN BIOPSY  1993    nose -small area       Review of Systems:    Constitutional: Pleasant gentleman currently laying in the gurney in no distress  Cardiovascular: No  chest pain or palpitations noted.  Respiratory: No shortness of breath noted.  Gastrointestinal: No nausea and vomiting noted.  Genitourinary: No bladder incontinence noted.  However he usually uses adult diapers.  Musculoskeletal: No aches and pains in the muscles or joints noted.  Dermatological: No skin breakdown noted.  Neurological: NIH stroke scale of 0  Psychiatric: No major anxiety or depression noted.  Ophthalmological: No visual changes noted.          Medications On Admission  (Not in a hospital admission)      Prior to Admission medications    Medication Sig Start Date End Date Taking? Authorizing Provider   aspirin 81 MG EC tablet Take 1 tablet by mouth Daily. 11/2/16   Raman Rae MD   atorvastatin (LIPITOR) 40 MG tablet Take 1 tablet by mouth Every Night.    ProviderRosalina MD   bicalutamide (CASODEX) 50 MG tablet Take 1 tablet by mouth Every Morning. 12/10/24   Roman Lebron DO   carvedilol (COREG) 12.5 MG tablet Take 1 tablet by mouth 2 (Two) Times a Day.  Patient taking differently: Take 1 tablet by mouth Daily. 11/17/20   Faviola Bonner PA   Continuous Blood Gluc Sensor (Dexcom G7 Sensor) misc 1 each Every 10 (Ten) Days.    ProviderRosalina MD   glimepiride (AMARYL) 2 MG tablet Take 1 tablet by mouth 2 (Two) Times a Day. 12/6/23   Carlotta Moncada APRN   Glucagon HCl (Glucagon Emergency) 1 MG/ML reconstituted solution Inject 1 mg as directed As Needed (for severe low blood sugar). 2/15/23   Carlotta Moncada APRN   Insulin Glargine (Lantus SoloStar) 100 UNIT/ML injection pen Inject 20-45 Units under the skin into the appropriate area as directed Daily. 20-25 units twice daily    Rosalina Adhikari MD   lisinopril (PRINIVIL,ZESTRIL) 10 MG tablet Take 1 tablet by mouth Daily. 6/7/18   Clayton Dickinson MD   metFORMIN (GLUCOPHAGE) 500 MG tablet Take 2 tablets by mouth 2 (Two) Times a Day.    Rosalina Adhikari MD   omeprazole (priLOSEC) 20 MG capsule Take 1 capsule by  mouth Every Morning. 12/10/24   Roman Lebron DO   tamsulosin (FLOMAX) 0.4 MG capsule 24 hr capsule Take 1 capsule by mouth Every Night.    Provider, MD Rosalina   traMADol (ULTRAM) 50 MG tablet Take 1 tablet by mouth 4 (Four) Times a Day As Needed for Moderate Pain.    Provider, MD Rosalina        Allergies:  No Known Allergies    Social Hx:  Social History     Socioeconomic History    Marital status:    Tobacco Use    Smoking status: Former     Current packs/day: 0.00     Average packs/day: 2.0 packs/day for 19.0 years (38.0 ttl pk-yrs)     Types: Cigarettes     Start date:      Quit date:      Years since quittin.9    Smokeless tobacco: Former     Types: Chew     Quit date:    Vaping Use    Vaping status: Never Used   Substance and Sexual Activity    Alcohol use: No    Drug use: No    Sexual activity: Defer       Family Hx:  Family History   Problem Relation Age of Onset    Cancer Mother         breast    Heart attack Mother     Hypertension Father     Breast cancer Sister     Breast cancer Sister     Breast cancer Sister     No Known Problems Sister     No Known Problems Brother        Review of Imaging (Interpretation of images not reports): The CT angiogram of the head and neck with contrast was reviewed in details as follows:    CT Angiography Head With Intravenous Contrast     EXAM DATE:    2024 3:51 PM     CLINICAL HISTORY:    Stroke, follow up     TECHNIQUE:    Axial computed tomographic angiography images of the head with  intravenous contrast. LVO analysis was performed with RAPID.AI software.   This CT exam was performed using one or more of the following dose  reduction techniques:  automated exposure control, adjustment of the mA  and/or kV according to patient size, and/or use of iterative  reconstruction technique.    MIP reconstructed images were created and reviewed.     COMPARISON:    No relevant prior studies available.     FINDINGS:    RIGHT INTERNAL  CAROTID ARTERY:  No acute findings.  Intracranial  segment is patent with no significant stenosis.  No aneurysm.    RIGHT ANTERIOR CEREBRAL ARTERY:  Unremarkable.  No occlusion or  significant stenosis.  No aneurysm.    RIGHT MIDDLE CEREBRAL ARTERY:  Unremarkable.  No occlusion or  significant stenosis.  No aneurysm.    RIGHT POSTERIOR CEREBRAL ARTERY:  Unremarkable.  No occlusion or  significant stenosis.  No aneurysm.    RIGHT VERTEBRAL ARTERY:  Unremarkable as visualized.       LEFT INTERNAL CAROTID ARTERY:  No acute findings.  Intracranial  segment is patent with no significant stenosis.  No aneurysm.    LEFT ANTERIOR CEREBRAL ARTERY:  Unremarkable.  No occlusion or  significant stenosis.  No aneurysm.    LEFT MIDDLE CEREBRAL ARTERY:  Unremarkable.  No occlusion or  significant stenosis.  No aneurysm.    LEFT POSTERIOR CEREBRAL ARTERY:  Unremarkable.  No occlusion or  significant stenosis.  No aneurysm.    LEFT VERTEBRAL ARTERY:  Unremarkable as visualized.       BASILAR ARTERY:  Unremarkable.  No occlusion or significant stenosis.   No aneurysm.    OTHER FINDINGS:  No hemodynamically significant stenosis or aneurysm  identified.     IMPRESSION:    No hemodynamically significant stenosis or aneurysm identified.    CT Head Without Contrast Stroke Protocol    Result Date: 12/18/2024  CT HEAD WO CONTRAST STROKE PROTOCOL-  CLINICAL INDICATION: Neuro deficit, acute, stroke suspected   COMPARISON: 10/28/2016  TECHNIQUE: Axial images of the brain were obtained with out intravenous contrast.  Reformatted images were created in the sagittal and coronal planes.  DOSE:  Radiation dose reduction techniques were utilized per ALARA protocol. Automated exposure control was initiated through either or [employer] or DoseRight software packages by  protocol.    FINDINGS:   BRAIN:  Unremarkable.  No hemorrhage.  No significant white matter disease.  No edema.    VENTRICLES:  Unremarkable.  No ventriculomegaly.     BONES/JOINTS:  Unremarkable.  No acute fracture.    SOFT TISSUES:  Unremarkable.    SINUSES:  no air fluid levels    MASTOID AIR CELLS:  Unremarkable as visualized.  No mastoid effusion.       Impression:  1. No evidence of an acute ischemic event 2. No parenchymal mass, hemorrhage, or midline shift 3. Results were relayed to the emergency department at 3:29 p.m.  This report was finalized on 12/18/2024 3:30 PM by Dr. Milad Wong MD.              Additional Tests Performed: Reviewed the CT perfusion which is not showing any mismatch.    Results for orders placed during the hospital encounter of 09/20/18    Adult Transthoracic Echo Limited W/ Cont if Necessary Per Protocol    Interpretation Summary  · Mild mitral valve regurgitation is present.  · Mild aortic valve regurgitation is present.  · The following left ventricular wall segments are hypokinetic: mid anterior, apical anterior, basal anterolateral, mid anterolateral, apical lateral, basal inferolateral, mid inferolateral, apical inferior, mid inferior, basal inferoseptal, basal anterior, basal inferior and basal inferoseptal. The following left ventricular wall segments are dyskinetic: apical septal, mid inferoseptal, mid anteroseptal and apex.  · The left ventricular cavity is mild-to-moderately dilated.  · Left ventricular systolic function is severely decreased. Estimated EF = 25%.  · Left atrial cavity size is mild-to-moderately dilated.  · There is no evidence of pericardial effusion.  · No evidence of pulmonary hypertension is present.  · Mild MAC is present.  · The aortic valve exhibits sclerosis.  · There is no evidence of a left ventricular mass or thrombus present.  · Saline test for shunting not performed.            Laboratory Results:   Lab Results   Component Value Date    GLUCOSE 251 (H) 12/18/2024    CALCIUM 9.3 12/18/2024     (L) 12/18/2024    K 4.3 12/18/2024    CO2 25.5 12/18/2024    CL 96 (L) 12/18/2024    BUN 17 12/18/2024     "CREATININE 0.97 12/18/2024    EGFRIFNONA 73 09/16/2018    BCR 17.5 12/18/2024    ANIONGAP 11.5 12/18/2024     Lab Results   Component Value Date    WBC 5.69 12/18/2024    HGB 11.9 (L) 12/18/2024    HCT 39.0 12/18/2024    MCV 99.7 (H) 12/18/2024    PLT 94 (L) 12/18/2024     Lab Results   Component Value Date    CHOL 112 10/30/2016     Lab Results   Component Value Date    HDL 37 (L) 11/19/2024    HDL 25 (L) 10/30/2016     Lab Results   Component Value Date     (H) 11/19/2024    LDL 68 10/30/2016     Lab Results   Component Value Date    TRIG 127 11/19/2024    TRIG 95 10/30/2016     Lab Results   Component Value Date    HGBA1C 9.50 (H) 11/19/2024     Lab Results   Component Value Date    INR 0.95 12/18/2024    INR 0.96 09/16/2018    PROTIME 12.7 12/18/2024    PROTIME 10.1 09/16/2018     Lab Results   Component Value Date    NDACNWST35 458 10/30/2016     Lab Results   Component Value Date    FOLATE 18.00 10/30/2016     TSH          12/18/2024    15:36   TSH   TSH 0.569           Physical Examination:  /82   Pulse 80   Temp 98.8 °F (37.1 °C) (Oral)   Resp 16   Ht 177.8 cm (70\")   Wt 82.8 kg (182 lb 9.6 oz)   SpO2 92%   BMI 26.20 kg/m²   General Appearance:   Well developed, well nourished, well groomed, alert, and cooperative.  HEENT: Normocephalic.    Neck and Spine: Normal range of motion.  Normal alignment. No mass or tenderness. No bruits.    Extremities:    No edema or deformities. Normal joint ROM.   Skin:    No rashes or birth marks.    Neurological examination:  Higher Integrative  Function: Oriented to time, place and person.  Although he was not able to tell me the correct year he was able to follow one-step and two-step commands and read aloud common words and phrases and also recognize common pictures and a complex picture without much problems..  CN II:  Pupils are equal, round, and reactive to light. Normal visual acuity and visual fields.    CN III IV VI: Extraocular movements are full " without nystagmus.   CN V:  Normal facial sensation and strength of muscles of mastication.  CN VII:  Facial movements are symmetric. No weakness.  CN VIII: Auditory acuity is normal.  CN IX & X: Symmetric palatal movement.  CN XI:  Sternocleidomastoid and trapezius are normal.  No weakness.  CN XII:  The tongue is midline.  No atrophy or fasciculations.  Motor:  Normal muscle strength, bulk and tone in upper and lower extremities.  No fasciculations, rigidity, spasticity, or abnormal movements.  Sensation: Normal to light touch, pinprick, vibration, temperature, and proprioception in arms and legs. Normal graphesthesia and no extinction on DSS.  Station and Gait: He was able to sit up by the side of the bed independently and stand up with help because he has a chronic low back pain issue for which he usually uses a cane or a walker at home..    Coordination:  Finger to nose test shows no dysmetria. Heel to shin normal.    NIHSS:    Baseline  0-->Alert: keenly responsive  0-->Answers both questions correctly  0-->Performs both tasks correctly  0=normal  0=No visual loss  0=Normal symmetric movement  0-->No drift: limb holds 90 (or 45) degrees for full 10 secs  0-->No drift: limb holds 90 (or 45) degrees for full 10 secs  0-->No drift: limb holds 90 (or 45) degrees for full 10 secs  0-->No drift: limb holds 90 (or 45) degrees for full 10 secs  0=Absent  0=Normal; no sensory loss  0=No aphasia, normal  0=Normal  0=No abnormality    Total score: 0    Diagnoses / Discussion:  84 y.o. who presents with Sx of confusion which could be postictal as he was staring off into space.  Patient has been on tramadol at bedtime for quite some time but this can lower seizure threshold.    Plan:  Continue his home medications as before except for the metformin which has to be held for at least 48 hours after administration of intravenous contrast iodinated dye.  Patient will be unable to get an MRI because he has got an AICD placed in  2018.  Transthoracic echocardiogram with bubble study for tomorrow to look for any PFO as well as ejection fraction as the previous 1 in 2018 was only 25%.  Carotid ultrasound to look for any soft plaques in the internal carotid arteries.  EEG with photic stimulation and hyperventilation to look for any unusual slowing in the left temporal area  Lipid profile, TSH, sed rate, hemoglobin A1c, vitamin B12, PSA and folic acid levels for tomorrow morning.  Have physical therapy/Occupational Therapy/speech pathology evaluate him to make sure he would be safe to go home independently with his family.  Gently hydrate the patient.  Because he passed his bedside swallow he should be on a carbohydrate controlled cardiac diet.  Keep the systolic blood pressure between 1 20-1 40 and the diastolic between 70-80..     I have discussed the above with the patient, Dr. Howe the emergency room physician and family.  He will be followed up tomorrow by inpatient teleneurology service along with ZBIGNIEW Daugherty  Time spent with patient: 70 minutes in face-to-face evaluation and management of the patient using the dedicated telemedicine device without any interruption with the help of the emergency room nurse with the patient located at the [facility] and myself at a remote location.    Electronically signed by Yan Haney MD, 24, 5:20 PM EST.    Dictated using Dragon dictation.                Electronically signed by Yan Haney MD at [id number]          Discharge Summary        Behbahani, Katayoun, MD at 24 1216              [facility]  DISCHARGE SUMMARY    Patient Identification:  Name:  Teddy De Leon  Age:  84 y.o.  Sex:  male  :  1940  MRN:  [id number]  Visit Number:  [id number]    Date of Admission: 2024  Date of Discharge:  2024    PCP: Roman Lebron DO      Admission/Discharge Diagnoses     Discharge  Diagnoses:  Acute neurologic encephalopathy  CVA/TIA ruled out  Possible seizure due to tramadol    Consults/Procedures     Consults:   Consults       Date and Time Order Name Status Description    12/19/2024  8:46 AM Inpatient Cardiology Consult      12/18/2024  3:17 PM Inpatient Neurology Consult Stroke Completed     12/18/2024  3:17 PM Inpatient Neurology Consult Stroke Completed             Procedures Performed:  EEG-generalized slowing, negative for epileptic activity    History of Presenting Illness   Patient is an 84-year-old male with history of ischemic cardiomyopathy status post AICD placement in 2016, CAD with history of PCI who presented to the emergency room this afternoon due to altered mental status.  Wife provides most of the history.  Reports patient went to primary care provider yesterday and was told all of his labs were normal and discussed some additional treatment for management of his diabetes.  This morning when patient woke up he appeared to be confused and was not able to get out of bed and had urinary incontinence which is very unusual for him.  Throughout the day he had difficulty ambulating and following directions and appeared very confused compared to his baseline therefore she brought him to the emergency room.  In the emergency room there were some concern for possible left-sided weakness as well as orientation deficits.  Wife denies any recent illness, fevers, chills, nausea, vomiting or bleeding.     Hospital Course     Patient was admitted due to acute encephalopathy for further workup.  By the time of evaluation in the ER patient had improved some however not back to baseline.  Through the night no events noted on telemetry.  This morning his mentation has improved significantly.  Per neurology exam he was able to ambulate without difficulties and strength of left side had significantly improved.  EEG did not show any epileptiform activities but did show overall slowing.   Neurology recommends stopping tramadol as can decrease seizure threshold.  He can continue home aspirin and statin.  Carotid ultrasound was completed pending final results echocardiogram with bubble studies were completed and pending final results these can be followed up with your neurology in outpatient setting.  Neurology had also ordered PSA and folate acid and B12 levels are currently pending.  These can be followed up either with primary care provider at follow-up with neurology.  Given patient is nearly back to baseline neurology is okay with him being discharged home with above recommendations.    Discharge Vitals/Physical Examination     Vital Signs:  Temp:  [97.2 °F (36.2 °C)-99 °F (37.2 °C)] 97.2 °F (36.2 °C)  Heart Rate:  [70-82] 76  Resp:  [16-20] 20  BP: (102-160)/(46-97) 142/78  Mean Arterial Pressure (Non-Invasive) for the past 24 hrs (Last 3 readings):   Noninvasive MAP (mmHg)   12/19/24 1108 103   12/19/24 0719 92   12/19/24 0300 83     SpO2 Percentage    12/19/24 0300 12/19/24 0719 12/19/24 1108   SpO2: 96% 97% 96%     SpO2:  [91 %-98 %] 96 %  on  Flow (L/min) (Oxygen Therapy):  [2] 2;   Device (Oxygen Therapy): room air    Body mass index is 26.3 kg/m².  Wt Readings from Last 3 Encounters:   12/18/24 80.8 kg (178 lb 1.6 oz)   12/17/24 82.8 kg (182 lb 9.6 oz)   11/19/24 86.1 kg (189 lb 14.4 oz)         Physical Exam:  GEN: NAD  CV: RRR, systolic murmur unchanged  PULM: CTA, no wheeze or crackles, on room air  GI: Normal bowel sounds, soft and nontender  NEURO: No facial droop, left upper extremity lower extremity strength has improved from yesterday and near equal to the right side.  Time to respond to questions have returned to baseline.  SKIN: No peripheral edema, no new rashes  PSYCH: Alert and oriented x 3 with slight confusion regarding place    Pertinent Laboratory/Radiology Results     Pertinent Laboratory Results:        Results from last 7 days   Lab Units 12/19/24  0010   CHOLESTEROL  "mg/dL 145   TRIGLYCERIDES mg/dL 88   HDL CHOL mg/dL 39*   LDL CHOL mg/dL 89     Results from last 7 days   Lab Units 12/18/24  1521   PH, ARTERIAL pH units 7.459*   PO2 ART mm Hg 65.6*   PCO2, ARTERIAL mm Hg 37.2   HCO3 ART mmol/L 26.4*     Results from last 7 days   Lab Units 12/18/24  1536   CRP mg/dL 3.31*   WBC 10*3/mm3 5.69   HEMOGLOBIN g/dL 11.9*   HEMATOCRIT % 39.0   MCV fL 99.7*   MCHC g/dL 30.5*   PLATELETS 10*3/mm3 94*   INR  0.95     Results from last 7 days   Lab Units 12/19/24  0010 12/18/24  1536 12/18/24  1534   SODIUM mmol/L 131* 133*  --    POTASSIUM mmol/L 3.6 4.3  --    CHLORIDE mmol/L 95* 96*  --    CO2 mmol/L 22.9 25.5  --    BUN mg/dL 17 17  --    CREATININE mg/dL 0.92 0.97 1.00   CALCIUM mg/dL 9.2 9.3  --    GLUCOSE mg/dL 196* 251*  --    ALBUMIN g/dL  --  4.2  --    BILIRUBIN mg/dL  --  0.7  --    ALK PHOS U/L  --  65  --    AST (SGOT) U/L  --  11  --    ALT (SGPT) U/L  --  9  --    Estimated Creatinine Clearance: 70.7 mL/min (by C-G formula based on SCr of 0.92 mg/dL).  No results found for: \"AMMONIA\"    Glucose   Date/Time Value Ref Range Status   12/19/2024 1112 356 (H) 70 - 130 mg/dL Final   12/19/2024 0722 181 (H) 70 - 130 mg/dL Final   12/18/2024 2131 291 (H) 70 - 130 mg/dL Final     Lab Results   Component Value Date    HGBA1C 9.50 (H) 11/19/2024     Lab Results   Component Value Date    TSH 0.569 12/18/2024       Pain Management Panel  More data exists         Latest Ref Rng & Units 12/18/2024 8/17/2023   Pain Management Panel   Creatinine, Urine mg/dL - 156.4    Amphetamine, Urine Qual Negative Negative  -   Barbiturates Screen, Urine Negative Negative  -   Benzodiazepine Screen, Urine Negative Negative  -   Buprenorphine, Screen, Urine Negative Negative  -   Cocaine Screen, Urine Negative Negative  -   Fentanyl, Urine Negative Negative  -   Methadone Screen , Urine Negative Negative  -   Methamphetamine, Ur Negative Negative  -      Details                   Pertinent Radiology " Results:  Imaging Results (All)       Procedure Component Value Units Date/Time    US Carotid Bilateral [[id number]] Resulted: 12/19/24 1204     Updated: 12/19/24 1204    XR Chest 1 View [[id number]] Collected: 12/18/24 1602     Updated: 12/18/24 1605    Narrative:      XR CHEST 1 VW-     CLINICAL INDICATION: Acute Stroke Protocol (onset < 12 hrs)        COMPARISON: 11/28/2020     TECHNIQUE: Single frontal view of the chest.     FINDINGS:     LUNGS: Lungs are adequately aerated.      HEART AND MEDIASTINUM: Heart and mediastinal contours are unremarkable        SKELETON: Bony and soft tissue structures are unremarkable.             Impression:      No radiographic evidence of acute cardiac or pulmonary disease.           This report was finalized on 12/18/2024 4:03 PM by Dr. Milad Wong MD.       CT Angiogram Head w AI Analysis of LVO [[id number]] Collected: 12/18/24 1601     Updated: 12/18/24 1604    Narrative:      EXAM:    CT Angiography Head With Intravenous Contrast     EXAM DATE:    12/18/2024 3:51 PM     CLINICAL HISTORY:    Stroke, follow up     TECHNIQUE:    Axial computed tomographic angiography images of the head with  intravenous contrast. LVO analysis was performed with RAPID.AI software.   This CT exam was performed using one or more of the following dose  reduction techniques:  automated exposure control, adjustment of the mA  and/or kV according to patient size, and/or use of iterative  reconstruction technique.    MIP reconstructed images were created and reviewed.     COMPARISON:    No relevant prior studies available.     FINDINGS:    RIGHT INTERNAL CAROTID ARTERY:  No acute findings.  Intracranial  segment is patent with no significant stenosis.  No aneurysm.    RIGHT ANTERIOR CEREBRAL ARTERY:  Unremarkable.  No occlusion or  significant stenosis.  No aneurysm.    RIGHT MIDDLE CEREBRAL ARTERY:  Unremarkable.  No occlusion or  significant stenosis.  No aneurysm.    RIGHT POSTERIOR CEREBRAL ARTERY:   Unremarkable.  No occlusion or  significant stenosis.  No aneurysm.    RIGHT VERTEBRAL ARTERY:  Unremarkable as visualized.       LEFT INTERNAL CAROTID ARTERY:  No acute findings.  Intracranial  segment is patent with no significant stenosis.  No aneurysm.    LEFT ANTERIOR CEREBRAL ARTERY:  Unremarkable.  No occlusion or  significant stenosis.  No aneurysm.    LEFT MIDDLE CEREBRAL ARTERY:  Unremarkable.  No occlusion or  significant stenosis.  No aneurysm.    LEFT POSTERIOR CEREBRAL ARTERY:  Unremarkable.  No occlusion or  significant stenosis.  No aneurysm.    LEFT VERTEBRAL ARTERY:  Unremarkable as visualized.       BASILAR ARTERY:  Unremarkable.  No occlusion or significant stenosis.   No aneurysm.    OTHER FINDINGS:  No hemodynamically significant stenosis or aneurysm  identified.       Impression:        No hemodynamically significant stenosis or aneurysm identified.     This report was finalized on 12/18/2024 4:02 PM by Dr. Milad Wong MD.       CT Angiogram Neck [[id number]] Collected: 12/18/24 1600     Updated: 12/18/24 1603    Narrative:      EXAM:    CT Angiography Neck With Intravenous Contrast     EXAM DATE:    12/18/2024 3:52 PM     CLINICAL HISTORY:    Stroke, follow up     TECHNIQUE:    Axial computed tomographic angiography images of the neck with  intravenous contrast.  This CT exam was performed using one or more of  the following dose reduction techniques:  automated exposure control,  adjustment of the mA and/or kV according to patient size, and/or use of  iterative reconstruction technique.    MIP reconstructed images were created and reviewed.     COMPARISON:    None.     FINDINGS:      VASCULATURE:    RIGHT COMMON CAROTID ARTERY:  Unremarkable.  No occlusion or  significant stenosis.  No dissection.    RIGHT INTERNAL CAROTID ARTERY:  Unremarkable.  Extracranial segment is  patent with no occlusion or significant stenosis.  No dissection.    RIGHT EXTERNAL CAROTID ARTERY:  Unremarkable.  No  occlusion.    RIGHT VERTEBRAL ARTERY:  Unremarkable.  No occlusion or significant  stenosis.  No dissection.       LEFT COMMON CAROTID ARTERY:  Unremarkable.  No occlusion or  significant stenosis.  No dissection.    LEFT INTERNAL CAROTID ARTERY:  Unremarkable.  Extracranial segment is  patent with no occlusion or significant stenosis.  No dissection.    LEFT EXTERNAL CAROTID ARTERY:  Unremarkable.  No occlusion.    LEFT VERTEBRAL ARTERY:  Unremarkable.  No occlusion or significant  stenosis.  No dissection.      NECK:    BONES/JOINTS:  Unremarkable.  No acute fracture.    SOFT TISSUES:  Unremarkable.    LUNG APICES:  Clear.    OTHER FINDINGS:  No hemodynamically significant stenosis detected.      CAROTID STENOSIS REFERENCE USING NASCET CRITERIA:    % ICA stenosis = (1 - narrowest ICA diameter/diameter of distal  cervical ICA) x 100.    Mild - <50% stenosis.    Moderate - 50-69% stenosis.    Severe - 70-94% stenosis.    Near occlusion - 95-99% stenosis.    Occluded - 100% stenosis.       Impression:        No hemodynamically significant stenosis detected.b     This report was finalized on 12/18/2024 4:01 PM by Dr. Milad Wong MD.       CT CEREBRAL PERFUSION WITH & WITHOUT CONTRAST [[id number]] Resulted: 12/18/24 1552     Updated: 12/18/24 1554    CT Head Without Contrast Stroke Protocol [[id number]] Collected: 12/18/24 1530     Updated: 12/18/24 1532    Narrative:      CT HEAD WO CONTRAST STROKE PROTOCOL-     CLINICAL INDICATION: Neuro deficit, acute, stroke suspected        COMPARISON: 10/28/2016     TECHNIQUE: Axial images of the brain were obtained with out intravenous  contrast.  Reformatted images were created in the sagittal and coronal  planes.     DOSE:     Radiation dose reduction techniques were utilized per ALARA protocol.  Automated exposure control was initiated through either or [employer] or  [employer] software packages by  protocol.        FINDINGS:    BRAIN:  Unremarkable.  No hemorrhage.   No significant white matter  disease.  No edema.       VENTRICLES:  Unremarkable.  No ventriculomegaly.       BONES/JOINTS:  Unremarkable.  No acute fracture.       SOFT TISSUES:  Unremarkable.       SINUSES:  no air fluid levels       MASTOID AIR CELLS:  Unremarkable as visualized.  No mastoid effusion.          Impression:         1. No evidence of an acute ischemic event  2. No parenchymal mass, hemorrhage, or midline shift  3. Results were relayed to the emergency department at 3:29 p.m.     This report was finalized on 12/18/2024 3:30 PM by Dr. Milad Wong MD.               Test Results Pending at Discharge:  Pending Labs       Order Current Status    Blood Culture - Blood, Arm, Left In process    Blood Culture - Blood, Arm, Right In process    Folate In process    PSA DIAGNOSTIC In process    Vitamin B12 In process            Discharge Disposition/Discharge Medications/Discharge Appointments     Discharge Disposition:   Home or Self Care    Condition at Discharge:  Stable     DME Prescribed at Discharge:  none    Discharge Diet:  Diet Instructions       Diet: Diabetic Diets; Consistent Carbohydrate; Soft to Chew (NDD 3); Chopped Meat; Thin (IDDSI 0)      Discharge Diet: Diabetic Diets    Diabetic Diet: Consistent Carbohydrate    Texture: Soft to Chew (NDD 3)    Soft to Chew: Chopped Meat    Fluid Consistency: Thin (IDDSI 0)            Discharge Activity:  Activity Instructions       Activity as Tolerated              Code Status While Inpatient:  Code Status and Medical Interventions: CPR (Attempt to Resuscitate); Full Support   Ordered at: 12/18/24 1841     Level Of Support Discussed With:    Health Care Surrogate     Code Status (Patient has no pulse and is not breathing):    CPR (Attempt to Resuscitate)     Medical Interventions (Patient has pulse or is breathing):    Full Support       Discharge Medications:     Discharge Medications        Continue These Medications        Instructions Start Date    aspirin 81 MG EC tablet   81 mg, Oral, Daily      atorvastatin 40 MG tablet  Commonly known as: LIPITOR   40 mg, Nightly      bicalutamide 50 MG tablet  Commonly known as: CASODEX   50 mg, Oral, Every Morning      carvedilol 6.25 MG tablet  Commonly known as: COREG   6.25 mg, 2 Times Daily With Meals      glimepiride 2 MG tablet  Commonly known as: AMARYL   2 mg, Oral, 2 Times Daily      Lantus SoloStar 100 UNIT/ML injection pen  Generic drug: Insulin Glargine   40 Units, Daily      metFORMIN 500 MG tablet  Commonly known as: GLUCOPHAGE   1,000 mg, 2 Times Daily      nabumetone 500 MG tablet  Commonly known as: RELAFEN   500 mg, 2 Times Daily      omeprazole 20 MG capsule  Commonly known as: priLOSEC   20 mg, Oral, Every Morning      tamsulosin 0.4 MG capsule 24 hr capsule  Commonly known as: FLOMAX   1 capsule, Nightly             Stop These Medications      traMADol 50 MG tablet  Commonly known as: ULTRAM              Discharge Appointments:  Your Scheduled Appointments      Feb 18, 2025 2:30 PM  Office Visit with Roman Lebron DO  [facility] ([place]) [address] [place] [address] [phone]   Arrive 15 minutes prior to appointment.  If you are in need of a language or hearing  please call the Department.         Sep 05, 2025 1:45 PM  Follow Up with Jimbo Bentley MD  [facility]OLOG[facility] ([place]) [address] [place] [address] [place] [id number]  [phone]   -Bring photo ID, insurance card, and list of medications to appointment  -If testing was completed outside of [facility] then patient must bring images on a disc  -Copay will be collected at time of appointment  -Established patients should arrive 15 minutes prior to appointment                Additional Instructions for the Follow-ups that You Need to Schedule       Discharge Follow-up with PCP   As directed       Currently Documented PCP:    Roman Lebron DO    PCP Phone  Number:    [phone]     Follow Up Details: 1 week to follow up on folate, B12 and PSA levels        Discharge Follow-up with Specified Provider: neurology to f/u on TTE w bubble and carotid US results and reassess regarding encephalopathy; 1 Month   As directed      To: neurology to f/u on TTE w bubble and carotid US results and reassess regarding encephalopathy   Follow Up: 1 Month                .Pending Labs at Discharge:  Pending Labs       Order Current Status    Blood Culture - Blood, Arm, Left In process    Blood Culture - Blood, Arm, Right In process    Folate In process    PSA DIAGNOSTIC In process    Vitamin B12 In process           Katayoun Behbahani, MD  Hospitalist Service -- [facility]       12/19/24  12:22 EST    Discharge Time: >30 minutes to coordinate care with specialist, educate and arrange for follow-up and discharge instructions.        Electronically signed by Behbahani, Katayoun, MD at 12/19/24 1223       Discharge Order (From admission, onward)       Start     Ordered    12/19/24 1210  Discharge patient  Once        Expected Discharge Date: 12/19/24   Expected Discharge Time: Afternoon   Discharge Disposition: Home or Self Care   Physician of Record for Attribution - Please select from Treatment Team: BEHBAHANI, KATAYOUN [[id number]]   Review needed by CMO to determine Physician of Record: No   Please choose which facility the patient is currently admitted if they are being discharged to another facility or unit.:  [facility]   Mode: Family      Question Answer Comment   Physician of Record for Attribution - Please select from Treatment Team BEHBAHANI, KATAYOUN    Review needed by CMO to determine Physician of Record No    Please choose which facility the patient is currently admitted if they are being discharged to another facility or unit.  [facility]    Mode: Family        12/19/24 121

## 2024-12-19 NOTE — PROGRESS NOTES
"DOS: 2024  NAME: Teddy De Leon   : 1940  PCP: Roman Lebron DO  Chief Complaint   Patient presents with    Altered Mental Status       Chief complaint: He is completely back to baseline.  Subjective: He was able to tell me his life history and how he met his current wife.    Objective:  Vital signs: /70 (BP Location: Left arm, Patient Position: Lying)   Pulse 73   Temp 97.7 °F (36.5 °C) (Oral)   Resp 18   Ht 175.3 cm (69\")   Wt 80.8 kg (178 lb 1.6 oz)   SpO2 97%   BMI 26.30 kg/m²    Gen: NAD, vitals reviewed  MS: Back to baseline.  CN: Cranials 2-12: No focal deficits noted.  Motor: Muscles of both upper and lower extremities show good bulk power and tone.  Sensory: No sensory loss noted.  Coordination: Normal finger-to-nose coordination noted.  Gait: He was able to stand up and get up and walk around independently but he is usually steadier with a cane or a walker after he had injured his back approximately 15 years ago.    ROS:  General: Pleasant gentleman currently back to baseline.  He has a cardiac AICD which is MRI compatible but the MRI of the brain cannot be performed in our center as we do not have an electrophysiologist cardiologist here.  Neurological: The NIH stroke scale is 0.    Laboratory results:  Lab Results   Component Value Date    GLUCOSE 196 (H) 2024    CALCIUM 9.2 2024     (L) 2024    K 3.6 2024    CO2 22.9 2024    CL 95 (L) 2024    BUN 17 2024    CREATININE 0.92 2024    EGFRIFNONA 73 2018    BCR 18.5 2024    ANIONGAP 13.1 2024     Lab Results   Component Value Date    WBC 5.69 2024    HGB 11.9 (L) 2024    HCT 39.0 2024    MCV 99.7 (H) 2024    PLT 94 (L) 2024     Lab Results   Component Value Date    LDL 89 2024     (H) 2024    LDL 68 10/30/2016            Review of labs: The LDL is 89.  The fasting blood glucose is 196.  The hemoglobin A1c " is 9.5.     CMP:        Lab 12/19/24  0010 12/18/24  1536 12/18/24  1534   SODIUM 131* 133*  --    POTASSIUM 3.6 4.3  --    CHLORIDE 95* 96*  --    CO2 22.9 25.5  --    ANION GAP 13.1 11.5  --    BUN 17 17  --    CREATININE 0.92 0.97 1.00   EGFR 83.1 77.9  --    GLUCOSE 196* 251*  --    CALCIUM 9.2 9.3  --    TOTAL PROTEIN  --  6.6  --    ALBUMIN  --  4.2  --    GLOBULIN  --  2.4  --    ALT (SGPT)  --  9  --    AST (SGOT)  --  11  --    BILIRUBIN  --  0.7  --    ALK PHOS  --  65  --         TSH          12/18/2024    15:36   TSH   TSH 0.569         Lipid Panel          11/19/2024    11:30 12/19/2024    00:10   Lipid Panel   Total Cholesterol  145    Total Cholesterol 172     Triglycerides 127  88    HDL Cholesterol 37  39    VLDL Cholesterol 23  17    LDL Cholesterol  112  89    LDL/HDL Ratio  2.27         Lab Results   Component Value Date    [id number] 354 12/19/2024       Lab Results   Component Value Date    FOLATE 10.70 12/19/2024       Lab Results   Component Value Date    HGBA1C 9.50 (H) 11/19/2024           Review and interpretation of imaging:  The CT angiogram of the head and neck with contrast was reviewed in details as follows:     CT Angiography Head With Intravenous Contrast     EXAM DATE:    12/18/2024 3:51 PM     CLINICAL HISTORY:    Stroke, follow up     TECHNIQUE:    Axial computed tomographic angiography images of the head with  intravenous contrast. LVO analysis was performed with RAPID.[employer] software.   This CT exam was performed using one or more of the following dose  reduction techniques:  automated exposure control, adjustment of the mA  and/or kV according to patient size, and/or use of iterative  reconstruction technique.    MIP reconstructed images were created and reviewed.     COMPARISON:    No relevant prior studies available.     FINDINGS:    RIGHT INTERNAL CAROTID ARTERY:  No acute findings.  Intracranial  segment is patent with no significant stenosis.  No aneurysm.    RIGHT ANTERIOR  CEREBRAL ARTERY:  Unremarkable.  No occlusion or  significant stenosis.  No aneurysm.    RIGHT MIDDLE CEREBRAL ARTERY:  Unremarkable.  No occlusion or  significant stenosis.  No aneurysm.    RIGHT POSTERIOR CEREBRAL ARTERY:  Unremarkable.  No occlusion or  significant stenosis.  No aneurysm.    RIGHT VERTEBRAL ARTERY:  Unremarkable as visualized.       LEFT INTERNAL CAROTID ARTERY:  No acute findings.  Intracranial  segment is patent with no significant stenosis.  No aneurysm.    LEFT ANTERIOR CEREBRAL ARTERY:  Unremarkable.  No occlusion or  significant stenosis.  No aneurysm.    LEFT MIDDLE CEREBRAL ARTERY:  Unremarkable.  No occlusion or  significant stenosis.  No aneurysm.    LEFT POSTERIOR CEREBRAL ARTERY:  Unremarkable.  No occlusion or  significant stenosis.  No aneurysm.    LEFT VERTEBRAL ARTERY:  Unremarkable as visualized.       BASILAR ARTERY:  Unremarkable.  No occlusion or significant stenosis.   No aneurysm.    OTHER FINDINGS:  No hemodynamically significant stenosis or aneurysm  identified.     IMPRESSION:    No hemodynamically significant stenosis or aneurysm identified.    CT Head Without Contrast Stroke Protocol    Result Date: 12/18/2024  CT HEAD WO CONTRAST STROKE PROTOCOL-  CLINICAL INDICATION: Neuro deficit, acute, stroke suspected   COMPARISON: 10/28/2016  TECHNIQUE: Axial images of the brain were obtained with out intravenous contrast.  Reformatted images were created in the sagittal and coronal planes.  DOSE:  Radiation dose reduction techniques were utilized per ALARA protocol. Automated exposure control was initiated through either or CareDose or DoseRigBestcake software packages by  protocol.    FINDINGS:   BRAIN:  Unremarkable.  No hemorrhage.  No significant white matter disease.  No edema.    VENTRICLES:  Unremarkable.  No ventriculomegaly.    BONES/JOINTS:  Unremarkable.  No acute fracture.    SOFT TISSUES:  Unremarkable.    SINUSES:  no air fluid levels    MASTOID AIR CELLS:   Unremarkable as visualized.  No mastoid effusion.       Impression:  1. No evidence of an acute ischemic event 2. No parenchymal mass, hemorrhage, or midline shift 3. Results were relayed to the emergency department at 3:29 p.m.  This report was finalized on 12/18/2024 3:30 PM by Dr. Milad Wong MD.              Workup to date: The EEG as interpreted by my colleague Dr. Francisco Leal is as follows:    Findings:     The patient is resting quietly in bed and appears asleep.  Diffuse low to medium amplitude 3-6 Hz intermixed delta and theta activity is present symmetrically over both hemispheres.  Rare vertex waves are present.  Later in the study when the patient is more awake there is an increase in faster 6-8 Hz theta frequencies although the patient then rapidly falls back into sleep.  Over the course of the study, no focal features or epileptiform activity are present.  Hyperventilation does not change the background.  Photic stimulation does not elicit abnormality.  No focal features or epileptiform activity are present.       Video: Available     Technical quality: Good     EKG: Regular, 70 bpm     SUMMARY:     Excessive drowsiness     Mild generalized slow     No focal features or epileptiform activity are seen      IMPRESSION:     Diffuse cerebral dysfunction mild degree, nonspecific.  This is most commonly seen due to toxic/metabolic cause     No evidence for epilepsy is present    Results for orders placed during the hospital encounter of 12/18/24    Adult Transthoracic Echo Complete W/ Cont if Necessary Per Protocol    Interpretation Summary    Left ventricular systolic function is moderately decreased. Left ventricular ejection fraction appears to be 36 - 40%.    Left ventricular wall thickness is consistent with concentric hypertrophy.    Left ventricular diastolic function was indeterminate.    Mildly reduced right ventricular systolic function noted.    Saline test results are negative for right to left  atrial level shunt.    Estimated right ventricular systolic pressure from tricuspid regurgitation is normal (<35 mmHg).    Mild dilation of the aortic root is present.    Mild aortic valve regurgitation is present.         Diagnoses: Patient with altered mentation currently back to baseline      Comment: Discussed about not to use any tramadol which can lower seizure threshold.    Plan:  1.  As the patient is medically stable he can be discharged home.  2.  Follow-up as outpatient in the neurology clinic with Dr. Mendy Dsouza or Dr. Jameson or his nurse practitioner Ms. Elodia Rivers in 2 to 4 weeks time at the [facility] in [place].  3.  No further stroke workup is needed at this time as it is not a stroke.  4.  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  5.  He has been instructed not to drive or use any hazardous equipment or engage in any hazardous activities until he is spell free for the next 90 days.    Discussed with the patient and his care team and at this point I do not have any further suggestions and will be signing off.    Spent a total of 30 minutes in face-to-face evaluation and management of the patient using the dedicated telemedicine device without any interruption with the help of ZBIGNIEW Daugherty with the patient located at the [facility] and myself at a remote location.    Electronically signed by Yan Haney MD, 12/19/24, 5:43 PM EST.

## 2024-12-19 NOTE — CASE MANAGEMENT/SOCIAL WORK
Discharge Planning Assessment  [facility]     Patient Name: Teddy De Leon  MRN: [id number]  Today's Date: 12/19/2024    Admit Date: 12/18/2024    Plan: SS received consult per nsg for discharge planning.  SS noted ED Case Management completed initial referral.  Pt resides at home with spouse, Miri, and plans to return home at discharge.  Pt currently does not utilize home health services.  Pt has straight cane via family.  Pt's PCP is Roman Lebron.  Pt utilizes [facility] in [facility].  Pt family to transport at discharge via private auto.  SS will follow and assist with discharge needs.     Discharge Plan       Row Name 12/19/24 1236       Plan    Final Discharge Disposition Code 01 - home or self-care    Final Note Pt to be discharged home on this date.      Row Name 12/19/24 1221                  Row Name 12/19/24 1142       Plan    Plan SS received consult per ns for discharge planning.  SS noted ED Case Management completed initial referral.  Pt resides at home with spouse, Miri, and plans to return home at discharge.  Pt currently does not utilize home health services.  Pt has straight cane via family.  Pt's PCP is Roman Lebron.  Pt utilizes [facility] in [facility].  Pt family to transport at discharge via private auto.  SS will follow and assist with discharge needs.                  Continued Care and Services - Admitted Since 12/18/2024    No active coordination exists for this encounter.       Expected Discharge Date and Time       Expected Discharge Date Expected Discharge Time    Dec 19, 2024           MERON Gill

## 2024-12-19 NOTE — PLAN OF CARE
Pt resting in bed at this time, pt is A/O X3 and on RA. No complaints or distress noted. VSS afebrile. Plan of care ongoing.

## 2024-12-19 NOTE — THERAPY EVALUATION
Acute Care - Physical Therapy Initial Evaluation   [facility]     Patient Name: Teddy De Leon  : 1940  MRN: [id number]  Today's Date: 2024      Visit Dx:     ICD-10-CM ICD-9-CM   1. Altered mental status, unspecified altered mental status type  R41.82 780.97   2. 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    Ischemic cardiomyopathy, with LV ejection fraction of about 25% on recent echo Doppler study in 2017, compensated.    Type 2 diabetes mellitus with hypoglycemia, with long-term current use of insulin    Mixed hyperlipidemia    Essential hypertension    Back pain    Arrhythmia    Prostate cancer, status post radiation therapy and is currently on long-term chemotherapy.    Iron deficiency anemia    Sternal fracture    LBBB (left bundle branch block)    Cardiac resynchronization therapy defibrillator (CRT-D) in place    Chronic systolic congestive heart failure    Encephalopathy acute     Past Medical History:   Diagnosis Date    Arthritis     sites varies     Coronary artery disease     stent inserted by Dr Palencia     Diabetes mellitus     insulin and po meds     GERD (gastroesophageal reflux disease)     Hyperlipidemia     Injury of back     Iron deficiency anemia 10/31/2016    Myocardial infarction 1998    Prostate cancer     radiation treatment -no surgery     Wears glasses      Past Surgical History:   Procedure Laterality Date    CARDIAC CATHETERIZATION      CARDIAC ELECTROPHYSIOLOGY PROCEDURE N/A 2018    Procedure: Implant ICD - bi ventricular;  Surgeon: Nas Quevedo DO;  Location:  [facility];  Service: Cardiology    CATARACT EXTRACTION, BILATERAL      2024 and May 2024    COLONOSCOPY      CORONARY STENT PLACEMENT      FRACTURE  SURGERY Right     rt arm repair     SKIN BIOPSY  1993    nose -small area     PT Assessment (Last 12 Hours)       PT Evaluation and Treatment       Row Name 12/19/24 1419          Physical Therapy Time and Intention    Subjective Information no complaints  -KM     Document Type evaluation  -KM     Mode of Treatment physical therapy  -KM     Patient Effort good  -KM       Row Name 12/19/24 1419          General Information    Patient Profile Reviewed yes  -KM     Patient Observations alert;cooperative;agree to therapy  -KM     Prior Level of Function independent:;all household mobility;ADL's  -KM     Existing Precautions/Restrictions fall  -KM     Risks Reviewed patient:;LOB;nausea/vomiting;dizziness;increased discomfort  -KM     Benefits Reviewed patient:;improve function;increase independence;increase strength;increase balance  -KM     Barriers to Rehab medically complex  -KM       Row Name 12/19/24 1419          Living Environment    Current Living Arrangements home  -KM     People in Home spouse  -KM     Primary Care Provided by self  -KM       Row Name 12/19/24 1419          Home Use of Assistive/Adaptive Equipment    Equipment Currently Used at Home cane, straight  -KM       Row Name 12/19/24 1419          Cognition    Affect/Mental Status (Cognition) WFL  -KM     Orientation Status (Cognition) oriented x 3  -KM     Follows Commands (Cognition) WFL  -KM     Cognitive Function (Cognition) safety deficit  -KM       Row Name 12/19/24 1419          Range of Motion (ROM)    Range of Motion bilateral lower extremities;ROM is WFL  -KM       [facility] Name 12/19/24 1419          Strength (Manual Muscle Testing)    Strength (Manual Muscle Testing) bilateral lower extremities;strength is WFL  -KM       Row Name 12/19/24 1419          Bed Mobility    Bed Mobility bed mobility (all) activities  -KM     All Activities, Grayson (Bed Mobility) supervision  -KM       Row Name 12/19/24 1419          Transfers    Transfers  sit-stand transfer;stand-sit transfer  -KM       Row Name 12/19/24 1419          Sit-Stand Transfer    Sit-Stand Cochran (Transfers) standby assist  -KM     Assistive Device (Sit-Stand Transfers) cane, straight  -KM       Row Name 12/19/24 1419          Stand-Sit Transfer    Stand-Sit Cochran (Transfers) standby assist  -KM     Assistive Device (Stand-Sit Transfers) cane, straight  -KM       Row Name 12/19/24 1419          Gait/Stairs (Locomotion)    Gait/Stairs Locomotion gait/ambulation independence;gait/ambulation assistive device;distance ambulated  -KM     Cochran Level (Gait) contact guard  -KM     Assistive Device (Gait) cane, straight  -KM     Patient was able to Ambulate yes  -KM     Distance in Feet (Gait) 150  -KM     Pattern (Gait) step-through  -KM     Deviations/Abnormal Patterns (Gait) ataxic;gait speed decreased  -KM       Row Name 12/19/24 1419          Safety Issues/Impairments Affecting Functional Mobility    Impairments Affecting Function (Mobility) balance;endurance/activity tolerance;range of motion (ROM);strength  -KM       Row Name 12/19/24 1419          Balance    Balance Assessment sitting static balance;standing dynamic balance  -KM     Static Sitting Balance supervision  -KM     Position, Sitting Balance sitting edge of bed  -KM     Dynamic Standing Balance contact guard  -KM     Position/Device Used, Standing Balance cane, straight  -KM       Row Name 12/19/24 1419          Plan of Care Review    Plan of Care Reviewed With patient;grandchild(matthew)  -     Outcome Evaluation Pt. evaluation completed during PT session. He was able to perform functional mobility skills w/ SBA. He ambulated increased distance w/ SPC. He tolerated session well w/ noted balance impairment while ambulating. Pt. to be discharged from facility on same day as evaluation.  -KM       Row Name 12/19/24 1419          Therapy Assessment/Plan (PT)    Functional Level at Time of Evaluation (PT) [facility]      Criteria for Skilled Interventions Met (PT) yes;skilled treatment is necessary  d/c from facility on same day as evaluation  -     Therapy Frequency (PT) evaluation only  -       Row Name 12/19/24 1419          Therapy Plan Review/Discharge Plan (PT)    Therapy Plan Review (PT) evaluation/treatment results reviewed;patient  -               User Key  (r) = Recorded By, (t) = Taken By, (c) = Cosigned By      Initials Name Provider Type    Garrett Qiu, PT Physical Therapist                    Physical Therapy Education       Title: PT OT SLP Therapies (Done)       Topic: Physical Therapy (Done)       Point: Mobility training (Done)       Learning Progress Summary            Patient Acceptance, E,TB, VU by  at 12/19/2024 1450                      Point: Home exercise program (Done)       Learning Progress Summary            Patient Acceptance, E,TB, VU by  at 12/19/2024 1450                      Point: Body mechanics (Done)       Learning Progress Summary            Patient Acceptance, E,TB, VU by  at 12/19/2024 1450                      Point: Precautions (Done)       Learning Progress Summary            Patient Acceptance, E,TB, VU by  at 12/19/2024 1450                                      User Key       Initials Effective Dates Name Provider Type Discipline     05/24/22 -  Garrett Navarro, PT Physical Therapist PT                  PT Recommendation and Plan  Anticipated Discharge Disposition (PT): home with assist, home with home health  Therapy Frequency (PT): evaluation only  Plan of Care Reviewed With: patient, grandchild(matthew)  Outcome Evaluation: Pt. evaluation completed during PT session. He was able to perform functional mobility skills w/ SBA. He ambulated increased distance w/ SPC. He tolerated session well w/ noted balance impairment while ambulating. Pt. to be discharged from facility on same day as evaluation.       Time Calculation:    PT Charges       Row Name 12/19/24 5690              Time Calculation    PT Received On 12/19/24  -GEOVANI                User Key  (r) = Recorded By, (t) = Taken By, (c) = Cosigned By      Initials Name Provider Type    Garrett Qiu, PT Physical Therapist                  Therapy Charges for Today       Code Description Service Date Service Provider Modifiers Qty    [id number] HC PT EVAL MOD COMPLEXITY 4 12/19/2024 Garrett Navarro, PT GP 1            PT G-Codes  AM-PAC 6 Clicks Score (PT): 12    Garrett Navarro, PT  12/19/2024

## 2024-12-19 NOTE — CASE MANAGEMENT/SOCIAL WORK
Discharge Planning Assessment  [facility]     Patient Name: Teddy De Leon  MRN: [id number]  Today's Date: 12/19/2024    Admit Date: 12/18/2024    Plan: SS received consult per nsg for discharge planning.  SS noted ED Case Management completed initial referral.  Pt resides at home with spouse, Miri, and plans to return home at discharge.  Pt currently does not utilize home health services.  Pt has straight cane via family.  Pt's PCP is Roman Lebron.  Pt utilizes [facility] in [facility].  Pt family to transport at discharge via private auto.  SS will follow and assist with discharge needs.       Discharge Plan       Row Name 12/19/24 1142       Plan    Plan SS received consult per ns for discharge planning.  SS noted ED Case Management completed initial referral.  Pt resides at home with spouse, Miri, and plans to return home at discharge.  Pt currently does not utilize home health services.  Pt has straight cane via family.  Pt's PCP is Roman Lebron.  Pt utilizes [facility] in [facility].  Pt family to transport at discharge via private auto.  SS will follow and assist with discharge needs.                  Continued Care and Services - Admitted Since 12/18/2024    No active coordination exists for this encounter.       Expected Discharge Date and Time       Expected Discharge Date Expected Discharge Time    Dec 21, 2024             MERON Gill

## 2024-12-19 NOTE — PAYOR COMM NOTE
"CONTACT:  DELORES KAPLAN RN  UTILIZATION MANAGEMENT DEPT.   [facility]   [facility], [address]   PHONE:  [phone]   FAX: [phone]         INPATIENT AUTH REQUEST            Monique De Leon (84 y.o. Male)       Date of Birth   1940    Social Security Number       Address   [address] [place] [address]    Home Phone       MRN   [id number]       [employer]into   Congregational    Marital Status                               Admission Date   12/18/24    Admission Type   Emergency    Admitting Provider   Behbahani, Katayoun, MD    Attending Provider   Behbahani, Katayoun, MD    Department, Room/Bed   [facility] [address], [address]       Discharge Date       Discharge Disposition       Discharge Destination                                 Attending Provider: Behbahani, Katayoun, MD    Allergies: No Known Allergies    Isolation: None   Infection: None   Code Status: CPR    Ht: 175.3 cm (69\")   Wt: 80.8 kg (178 lb 1.6 oz)    Admission Cmt: None   Principal Problem: Encephalopathy acute [G93.40]                   Active Insurance as of 12/18/2024       Primary Coverage       Payor Plan Insurance Group Employer/Plan Group    HUMANA MEDICARE REPLACEMENT HUMANA MED ADV SNP HMO [id number]       Payor Plan Address Payor Plan Phone Number Payor Plan Fax Number Effective Dates    [address] [address] [phone]  1/1/2024 - None Entered    [facility] [id number]         Subscriber Name Subscriber Birth Date Member ID       MONIQUE DE LEON 1940 [id number]                     Emergency Contacts        (Rel.) Home Phone Work Phone Mobile Phone    CRYSTAL DE LEON (Spouse) [phone] -- [phone]    HAL,PRADEEP (Daughter) -- -- [phone]    MONIQUE DE LEON JR (Son) -- -- [phone]                 History & Physical        Behbahani, Katayoun, MD at 12/18/24 1839               [facility] Medicine Services  HISTORY & " PHYSICAL    Patient Identification:  Name:  Teddy De Leon  Age:  84 y.o.  Sex:  male  :  1940  MRN:  [id number]   Visit Number:  [id number]  Admit Date: 2024   Primary Care Physician:  Roman Lebron DO     Subjective     Chief complaint:   Chief Complaint   Patient presents with    Altered Mental Status     History of presenting illness:   Patient is an 84-year-old male with history of ischemic cardiomyopathy status post AICD placement in 2016, CAD with history of PCI who presented to the emergency room this afternoon due to altered mental status.  Wife provides most of the history.  Reports patient went to primary care provider yesterday and was told all of his labs were normal and discussed some additional treatment for management of his diabetes.  This morning when patient woke up he appeared to be confused and was not able to get out of bed and had urinary incontinence which is very unusual for him.  Throughout the day he had difficulty ambulating and following directions and appeared very confused compared to his baseline therefore she brought him to the emergency room.  In the emergency room there were some concern for possible left-sided weakness as well as orientation deficits.  Wife denies any recent illness, fevers, chills, nausea, vomiting or bleeding.  ---------------------------------------------------------------------------------------------------------------------   Review of Systems : Negative except as per HPI  ---------------------------------------------------------------------------------------------------------------------   Past Medical History:   Diagnosis Date    Arthritis     sites varies     Coronary artery disease     stent inserted by Dr Palencia     Diabetes mellitus     insulin and po meds     GERD (gastroesophageal reflux disease)     Hyperlipidemia     Injury of back     Iron deficiency anemia 10/31/2016    Myocardial infarction     Prostate cancer      radiation treatment -no surgery     Wears glasses      Past Surgical History:   Procedure Laterality Date    CARDIAC CATHETERIZATION      CARDIAC ELECTROPHYSIOLOGY PROCEDURE N/A 2018    Procedure: Implant ICD - bi ventricular;  Surgeon: Nas Quevedo DO;  Location: [facility] INVASIVE LOCATION;  Service: Cardiology    CATARACT EXTRACTION, BILATERAL      2024 and May 2024    COLONOSCOPY      CORONARY STENT PLACEMENT      FRACTURE SURGERY Right     rt arm repair     SKIN BIOPSY      nose -small area     Family History   Problem Relation Age of Onset    Cancer Mother         breast    Heart attack Mother     Hypertension Father     Breast cancer Sister     Breast cancer Sister     Breast cancer Sister     No Known Problems Sister     No Known Problems Brother      Social History     Socioeconomic History    Marital status:    Tobacco Use    Smoking status: Former     Current packs/day: 0.00     Average packs/day: 2.0 packs/day for 19.0 years (38.0 ttl pk-yrs)     Types: Cigarettes     Start date:      Quit date:      Years since quittin.9    Smokeless tobacco: Former     Types: Chew     Quit date:    Vaping Use    Vaping status: Never Used   Substance and Sexual Activity    Alcohol use: No    Drug use: No    Sexual activity: Defer     ---------------------------------------------------------------------------------------------------------------------   Allergies:  Patient has no known allergies.  ---------------------------------------------------------------------------------------------------------------------   Medications below are reported home medications pulling from within the system; at this time, these medications have not been reconciled unless otherwise specified and are in the verification process for further verifcation as current home medications.    Prior to Admission Medications       Prescriptions Last Dose Informant Patient Reported? Taking?    atorvastatin  (LIPITOR) 40 MG tablet 12/17/2024 Self Yes Yes    Take 1 tablet by mouth Every Night.    bicalutamide (CASODEX) 50 MG tablet 12/18/2024  No Yes    Take 1 tablet by mouth Every Morning.    carvedilol (COREG) 6.25 MG tablet 12/18/2024  Yes Yes    Take 1 tablet by mouth 2 (Two) Times a Day With Meals.    glimepiride (AMARYL) 2 MG tablet 12/18/2024  No Yes    Take 1 tablet by mouth 2 (Two) Times a Day.    Insulin Glargine (Lantus SoloStar) 100 UNIT/ML injection pen 12/18/2024  Yes Yes    Inject 40 Units under the skin into the appropriate area as directed Daily.    metFORMIN (GLUCOPHAGE) 500 MG tablet 12/18/2024 Self Yes Yes    Take 2 tablets by mouth 2 (Two) Times a Day.    nabumetone (RELAFEN) 500 MG tablet 12/18/2024  Yes Yes    Take 1 tablet by mouth 2 (Two) Times a Day.    omeprazole (priLOSEC) 20 MG capsule 12/18/2024  No Yes    Take 1 capsule by mouth Every Morning.    tamsulosin (FLOMAX) 0.4 MG capsule 24 hr capsule 12/17/2024 Self Yes Yes    Take 1 capsule by mouth Every Night.    aspirin 81 MG EC tablet Unknown Self No No    Take 1 tablet by mouth Daily.    traMADol (ULTRAM) 50 MG tablet Unknown Self Yes No    Take 1 tablet by mouth 4 (Four) Times a Day As Needed for Moderate Pain.          ---------------------------------------------------------------------------------------------------------------------    Objective     Hospital Scheduled Meds:          Current listed hospital scheduled medications may not yet reflect those currently placed in orders that are signed and held, awaiting patient's arrival to floor/unit.    ---------------------------------------------------------------------------------------------------------------------   Vital Signs:  Temp:  [98.8 °F (37.1 °C)] 98.8 °F (37.1 °C)  Heart Rate:  [73-82] 73  Resp:  [16] 16  BP: (102-160)/(59-97) 135/79  Mean Arterial Pressure (Non-Invasive) for the past 24 hrs (Last 3 readings):   Noninvasive MAP (mmHg)   12/18/24 1815 100   12/18/24 1800 96    12/18/24 1745 102     SpO2 Percentage    12/18/24 1745 12/18/24 1800 12/18/24 1815   SpO2: 93% 95% 96%     SpO2:  [91 %-98 %] 96 %  on  Flow (L/min) (Oxygen Therapy):  [2] 2;   Device (Oxygen Therapy): room air    Body mass index is 26.2 kg/m².  Wt Readings from Last 3 Encounters:   12/18/24 82.8 kg (182 lb 9.6 oz)   12/17/24 82.8 kg (182 lb 9.6 oz)   11/19/24 86.1 kg (189 lb 14.4 oz)     ---------------------------------------------------------------------------------------------------------------------   Physical Exam:  GEN: NAD  EYES: Pupils are round and equal bilaterally, no scleral icterus  HENT: False dentition in place, moist oral mucosa, no oropharyngeal exudate  CV: RRR with mild systolic murmur  PULM: CTA, no wheeze or crackles on room air  GI: Obese abdomen, normal bowel sounds, soft and nontender  NEURO: No facial droop, normal speech, mild left lower extremity weakness noted compared to right.  Mild left proximal upper extremity weakness also noted compared to right.  Finger-to-nose intact bilaterally.  Some delayed responses to questions.  SKIN: No peripheral edema, no rashes  PSYCH: Alert and oriented to person and place and partially time.  He is able to make some jokes showing his baseline sense of humor.  He is unable to recall earlier events this morning.  ---------------------------------------------------------------------------------------------------------------------  EKG:    Paced rhythm    ---------------------------------------------------------------------------------------------------------------------           Results from last 7 days   Lab Units 12/18/24  1521   PH, ARTERIAL pH units 7.459*   PO2 ART mm Hg 65.6*   PCO2, ARTERIAL mm Hg 37.2   HCO3 ART mmol/L 26.4*     Results from last 7 days   Lab Units 12/18/24  1536   CRP mg/dL 3.31*   WBC 10*3/mm3 5.69   HEMOGLOBIN g/dL 11.9*   HEMATOCRIT % 39.0   MCV fL 99.7*   MCHC g/dL 30.5*   PLATELETS 10*3/mm3 94*   INR  0.95     Results  "from last 7 days   Lab Units 12/18/24  1536 12/18/24  1534   SODIUM mmol/L 133*  --    POTASSIUM mmol/L 4.3  --    CHLORIDE mmol/L 96*  --    CO2 mmol/L 25.5  --    BUN mg/dL 17  --    CREATININE mg/dL 0.97 1.00   CALCIUM mg/dL 9.3  --    GLUCOSE mg/dL 251*  --    ALBUMIN g/dL 4.2  --    BILIRUBIN mg/dL 0.7  --    ALK PHOS U/L 65  --    AST (SGOT) U/L 11  --    ALT (SGPT) U/L 9  --    Estimated Creatinine Clearance: 68.8 mL/min (by C-G formula based on SCr of 0.97 mg/dL).  No results found for: \"AMMONIA\"    No results found for: \"HGBA1C\", \"POCGLU\"  Lab Results   Component Value Date    HGBA1C 9.50 (H) 11/19/2024     Lab Results   Component Value Date    TSH 0.569 12/18/2024     Pain Management Panel  More data exists         Latest Ref Rng & Units 12/18/2024 8/17/2023   Pain Management Panel   Creatinine, Urine mg/dL - 156.4    Amphetamine, Urine Qual Negative Negative  -   Barbiturates Screen, Urine Negative Negative  -   Benzodiazepine Screen, Urine Negative Negative  -   Buprenorphine, Screen, Urine Negative Negative  -   Cocaine Screen, Urine Negative Negative  -   Fentanyl, Urine Negative Negative  -   Methadone Screen , Urine Negative Negative  -   Methamphetamine, Ur Negative Negative  -      Details                 I have personally reviewed the above laboratory results.   ---------------------------------------------------------------------------------------------------------------------  Imaging Results (Last 7 Days)       Procedure Component Value Units Date/Time    XR Chest 1 View [[id number]] Collected: 12/18/24 1602     Updated: 12/18/24 1605    Narrative:      XR CHEST 1 VW-     CLINICAL INDICATION: Acute Stroke Protocol (onset < 12 hrs)        COMPARISON: 11/28/2020     TECHNIQUE: Single frontal view of the chest.     FINDINGS:     LUNGS: Lungs are adequately aerated.      HEART AND MEDIASTINUM: Heart and mediastinal contours are unremarkable        SKELETON: Bony and soft tissue structures are " unremarkable.             Impression:      No radiographic evidence of acute cardiac or pulmonary disease.           This report was finalized on 12/18/2024 4:03 PM by Dr. Milad Wong MD.       CT Angiogram Head w AI Analysis of LVO [[id number]] Collected: 12/18/24 1601     Updated: 12/18/24 1604    Narrative:      EXAM:    CT Angiography Head With Intravenous Contrast     EXAM DATE:    12/18/2024 3:51 PM     CLINICAL HISTORY:    Stroke, follow up     TECHNIQUE:    Axial computed tomographic angiography images of the head with  intravenous contrast. LVO analysis was performed with [email or web address] software.   This CT exam was performed using one or more of the following dose  reduction techniques:  automated exposure control, adjustment of the mA  and/or kV according to patient size, and/or use of iterative  reconstruction technique.    MIP reconstructed images were created and reviewed.     COMPARISON:    No relevant prior studies available.     FINDINGS:    RIGHT INTERNAL CAROTID ARTERY:  No acute findings.  Intracranial  segment is patent with no significant stenosis.  No aneurysm.    RIGHT ANTERIOR CEREBRAL ARTERY:  Unremarkable.  No occlusion or  significant stenosis.  No aneurysm.    RIGHT MIDDLE CEREBRAL ARTERY:  Unremarkable.  No occlusion or  significant stenosis.  No aneurysm.    RIGHT POSTERIOR CEREBRAL ARTERY:  Unremarkable.  No occlusion or  significant stenosis.  No aneurysm.    RIGHT VERTEBRAL ARTERY:  Unremarkable as visualized.       LEFT INTERNAL CAROTID ARTERY:  No acute findings.  Intracranial  segment is patent with no significant stenosis.  No aneurysm.    LEFT ANTERIOR CEREBRAL ARTERY:  Unremarkable.  No occlusion or  significant stenosis.  No aneurysm.    LEFT MIDDLE CEREBRAL ARTERY:  Unremarkable.  No occlusion or  significant stenosis.  No aneurysm.    LEFT POSTERIOR CEREBRAL ARTERY:  Unremarkable.  No occlusion or  significant stenosis.  No aneurysm.    LEFT VERTEBRAL ARTERY:  Unremarkable as  visualized.       BASILAR ARTERY:  Unremarkable.  No occlusion or significant stenosis.   No aneurysm.    OTHER FINDINGS:  No hemodynamically significant stenosis or aneurysm  identified.       Impression:        No hemodynamically significant stenosis or aneurysm identified.     This report was finalized on 12/18/2024 4:02 PM by Dr. Milad Wong MD.       CT Angiogram Neck [[id number]] Collected: 12/18/24 1600     Updated: 12/18/24 1603    Narrative:      EXAM:    CT Angiography Neck With Intravenous Contrast     EXAM DATE:    12/18/2024 3:52 PM     CLINICAL HISTORY:    Stroke, follow up     TECHNIQUE:    Axial computed tomographic angiography images of the neck with  intravenous contrast.  This CT exam was performed using one or more of  the following dose reduction techniques:  automated exposure control,  adjustment of the mA and/or kV according to patient size, and/or use of  iterative reconstruction technique.    MIP reconstructed images were created and reviewed.     COMPARISON:    None.     FINDINGS:      VASCULATURE:    RIGHT COMMON CAROTID ARTERY:  Unremarkable.  No occlusion or  significant stenosis.  No dissection.    RIGHT INTERNAL CAROTID ARTERY:  Unremarkable.  Extracranial segment is  patent with no occlusion or significant stenosis.  No dissection.    RIGHT EXTERNAL CAROTID ARTERY:  Unremarkable.  No occlusion.    RIGHT VERTEBRAL ARTERY:  Unremarkable.  No occlusion or significant  stenosis.  No dissection.       LEFT COMMON CAROTID ARTERY:  Unremarkable.  No occlusion or  significant stenosis.  No dissection.    LEFT INTERNAL CAROTID ARTERY:  Unremarkable.  Extracranial segment is  patent with no occlusion or significant stenosis.  No dissection.    LEFT EXTERNAL CAROTID ARTERY:  Unremarkable.  No occlusion.    LEFT VERTEBRAL ARTERY:  Unremarkable.  No occlusion or significant  stenosis.  No dissection.      NECK:    BONES/JOINTS:  Unremarkable.  No acute fracture.    SOFT TISSUES:  Unremarkable.     LUNG APICES:  Clear.    OTHER FINDINGS:  No hemodynamically significant stenosis detected.      CAROTID STENOSIS REFERENCE USING NASCET CRITERIA:    % ICA stenosis = (1 - narrowest ICA diameter/diameter of distal  cervical ICA) x 100.    Mild - <50% stenosis.    Moderate - 50-69% stenosis.    Severe - 70-94% stenosis.    Near occlusion - 95-99% stenosis.    Occluded - 100% stenosis.       Impression:        No hemodynamically significant stenosis detected.b     This report was finalized on 12/18/2024 4:01 PM by Dr. Milad Wong MD.       CT CEREBRAL PERFUSION WITH & WITHOUT CONTRAST [[id number]] Resulted: 12/18/24 1552     Updated: 12/18/24 1554    CT Head Without Contrast Stroke Protocol [[id number]] Collected: 12/18/24 1530     Updated: 12/18/24 1532    Narrative:      CT HEAD WO CONTRAST STROKE PROTOCOL-     CLINICAL INDICATION: Neuro deficit, acute, stroke suspected        COMPARISON: 10/28/2016     TECHNIQUE: Axial images of the brain were obtained with out intravenous  contrast.  Reformatted images were created in the sagittal and coronal  planes.     DOSE:     Radiation dose reduction techniques were utilized per ALARA protocol.  Automated exposure control was initiated through either or CareDose or  DoseRight software packages by  protocol.        FINDINGS:    BRAIN:  Unremarkable.  No hemorrhage.  No significant white matter  disease.  No edema.       VENTRICLES:  Unremarkable.  No ventriculomegaly.       BONES/JOINTS:  Unremarkable.  No acute fracture.       SOFT TISSUES:  Unremarkable.       SINUSES:  no air fluid levels       MASTOID AIR CELLS:  Unremarkable as visualized.  No mastoid effusion.          Impression:         1. No evidence of an acute ischemic event  2. No parenchymal mass, hemorrhage, or midline shift  3. Results were relayed to the emergency department at 3:29 p.m.     This report was finalized on 12/18/2024 3:30 PM by Dr. Milad Wong MD.               Last  Echocardiogram:  Results for orders placed during the hospital encounter of 09/20/18    Adult Transthoracic Echo Limited W/ Cont if Necessary Per Protocol    Interpretation Summary  · Mild mitral valve regurgitation is present.  · Mild aortic valve regurgitation is present.  · The following left ventricular wall segments are hypokinetic: mid anterior, apical anterior, basal anterolateral, mid anterolateral, apical lateral, basal inferolateral, mid inferolateral, apical inferior, mid inferior, basal inferoseptal, basal anterior, basal inferior and basal inferoseptal. The following left ventricular wall segments are dyskinetic: apical septal, mid inferoseptal, mid anteroseptal and apex.  · The left ventricular cavity is mild-to-moderately dilated.  · Left ventricular systolic function is severely decreased. Estimated EF = 25%.  · Left atrial cavity size is mild-to-moderately dilated.  · There is no evidence of pericardial effusion.  · No evidence of pulmonary hypertension is present.  · Mild MAC is present.  · The aortic valve exhibits sclerosis.  · There is no evidence of a left ventricular mass or thrombus present.  · Saline test for shunting not performed.    ---------------------------------------------------------------------------------------------------------------------    Assessment & Plan      -Acute neurologic encephalopathy-concerning for possible CVA versus TIA versus seizure.  CT head is negative for intracranial hemorrhage or massive stroke.  CTA head and neck so far negative.  CT perfusion pending.  MRI brain was not able to be completed due to AICD.  Neurology was consulted and recommending admission and obtaining EEG.  Patient and family on unaware of the type of AICD.  Plan to identify type and  to assess compatibility with MRI and if compatible to pursue MRI tomorrow.  Continue seizure precautions, neurochecks and NIH scoring.    CHRONIC MEDICAL PROBLEMS:   -Type 2  diabetes-appears to be uncontrolled with A1c 9.3% recently.  Per primary care provider note plan was to start Ozempic however not started yet.  Jardiance was stopped during this visit.  He is currently on metformin and glipizide which both will be held during this admission especially given IV contrast exposure.  He has been using glargine at nighttime.  Will start a low-dose glargine at 10 units tonight as well as SSI and titrate as needed.  -Hypertension-seems to be well-controlled with carvedilol  -Chronic systolic heart failure-EF 21 to 25%.  Status post AICD.  Continue carvedilol.  Not on ACE/ARB/ARNI/MRA.  Jardiance was recently stopped by primary care provider as DM management was being adjusted. No report of fungal UTI. Per wife he takes lasix every day (will hold for now given IV contrast exposure).  No evidence of hypervolemia  -CAD with history of PCI-on aspirin and statin  -History of nonsustained VT-status post AICD  -History of prostate cancer-status post XRT.  On Casodex   -BPH-Flomax    ---------------------------------------------------  DVT Prophylaxis: Lovenox      The patient is considered to be a high risk patient due to: Age, possible CVA, systolic heart failure and uncontrolled diabetes      Code Status: Full    I have discussed the patient's assessment and plan with patient and family     Disposition: admit to telemetry    Katayoun Behbahani, MD  Hospitalist Service -- [facility]       12/18/24  18:39 EST         Electronically signed by Behbahani, Katayoun, MD at 12/18/24 3384          Emergency Department Notes        Russell Pereyra PCT at 12/18/24 163          Contacted neuro as per Dr. Howe. Is currently on the phone with them at this time.    Electronically signed by Russell Pereyra PCT at 12/18/24 0482       Martha Marcelo, RN at 12/18/24 1510          Patient wife and family now at bedside. Pt wife states that this morning at 0900, she noticed that patient was  pressing against her while laying in bed. She got up to check on him and noticed that he was starring off and not responding to questions. She also states that she noticed that patient had urinated in the bed, which is not like him. Wife reports that patient was unable to follow simple commands and seemed to not understand what she was saying. She noticed that he also was leaning to the right side as he was walking with her walker. She states that he went to bed last night acting his normal self.     Electronically signed by Martha Marcelo RN at 12/18/24 1640       Richmond Howe DO at 12/18/24 0525          Subjective   History of Present Illness  Patient is an 84-year-old male with history significant for CAD with prior PCI, type 2 diabetes mellitus and prostate cancer status post XRT who comes to the ER for altered mental status.  Patient is alert to self and place but not time or president.  He is answers are slow and sluggish.  He is awake and protecting airway.  History obtained by family present at bedside.  His wife states he was at his baseline when he went to bed last night.  He only complained about a sore throat which he said was better this morning.  She states around 9 AM, he was lying in bed and pushing back on her.  She tried to talk to him and he would not respond.  She looked over and he was just staring into the distance and had voided on himself which is unusual.  She states he eventually did respond but only to certain questions and then would not respond.  She helped him out of bed and got her walker in which he did.  He was falling towards the right side.  She stated he continued to look and act confused and not his normal self.  He was able to eat some breakfast and did not get strangled.  Given his persistent altered status, they brought him to the ER for evaluation.  No fevers or chills, no nausea or vomiting or diarrhea.  No known sick contacts.  No recent change in  medications.  He does not take anticoagulation.    Last known normal 9 AM this morning.  Code stroke called.      Review of Systems   Unable to perform ROS: Mental status change       Past Medical History:   Diagnosis Date    Arthritis     sites varies     Coronary artery disease     stent inserted by Dr Palencia     Diabetes mellitus     insulin and po meds     GERD (gastroesophageal reflux disease)     Hyperlipidemia     Injury of back     Iron deficiency anemia 10/31/2016    Myocardial infarction 1998    Prostate cancer     radiation treatment -no surgery     Wears glasses        No Known Allergies    Past Surgical History:   Procedure Laterality Date    CARDIAC CATHETERIZATION      CARDIAC ELECTROPHYSIOLOGY PROCEDURE N/A 2018    Procedure: Implant ICD - bi ventricular;  Surgeon: Nas Quevedo DO;  Location: [facility] EP INVASIVE LOCATION;  Service: Cardiology    CATARACT EXTRACTION, BILATERAL      2024 and May 2024    COLONOSCOPY      CORONARY STENT PLACEMENT      FRACTURE SURGERY Right     rt arm repair     SKIN BIOPSY      nose -small area       Family History   Problem Relation Age of Onset    Cancer Mother         breast    Heart attack Mother     Hypertension Father     Breast cancer Sister     Breast cancer Sister     Breast cancer Sister     No Known Problems Sister     No Known Problems Brother        Social History     Socioeconomic History    Marital status:    Tobacco Use    Smoking status: Former     Current packs/day: 0.00     Average packs/day: 2.0 packs/day for 19.0 years (38.0 ttl pk-yrs)     Types: Cigarettes     Start date:      Quit date:      Years since quittin.9    Smokeless tobacco: Former     Types: Chew     Quit date:    Vaping Use    Vaping status: Never Used   Substance and Sexual Activity    Alcohol use: No    Drug use: No    Sexual activity: Defer           Objective   Physical Exam  Vitals and nursing note reviewed.   Constitutional:        General: He is not in acute distress.     Appearance: He is well-developed and normal weight. He is not ill-appearing.   HENT:      Head: Normocephalic.      Mouth/Throat:      Mouth: Mucous membranes are moist.      Pharynx: Oropharynx is clear.   Eyes:      Extraocular Movements: Extraocular movements intact.      Pupils: Pupils are equal, round, and reactive to light.   Neck:      Vascular: No JVD.   Cardiovascular:      Rate and Rhythm: Normal rate and regular rhythm.      Heart sounds: No murmur heard.     No friction rub. No gallop.   Pulmonary:      Effort: Pulmonary effort is normal. No tachypnea.      Breath sounds: Normal breath sounds. No decreased breath sounds, wheezing, rhonchi or rales.   Abdominal:      General: Bowel sounds are normal.      Palpations: Abdomen is soft.   Musculoskeletal:         General: Normal range of motion.      Cervical back: Normal range of motion and neck supple.      Right lower leg: No edema.      Left lower leg: No edema.   Skin:     General: Skin is warm and dry.      Capillary Refill: Capillary refill takes less than 2 seconds.   Neurological:      General: No focal deficit present.      Mental Status: He is alert. He is disoriented and confused.      Gait: Gait abnormal.      Comments: Full neuroexam difficult to perform as patient has difficulty following commands.  Alert to self, place but not time.  Diminished sensation on the left side of the face.  Decreased  strength in the left hand.  Upper extremity left-sided weaker than right.  Could not follow finger-nose testing.           Procedures          ED Course  ED Course as of 12/18/24 1758   Wed Dec 18, 2024   1521 ECG 12 Lead Stroke Evaluation  Ventricular paced rhythm, rate 78, QTc 474, no acute ST or T wave changes [CW]      ED Course User Index  [CW] Richmond Howe DO                                NIH Stroke Scale: 6                        Medical Decision Making  --On arrival, patient alert to  self, place but sluggish and slow in response.  Outside the window for thrombolytics.  Not a candidate for thrombectomy due to no LVO.  No meningeal signs.  --labs unremarkable  --ABG ok  --CT head neg  --CT angios neg  --CT perfusion study negative  --unable to do MRI d/t AICD  --d/w neuro, recommended overnight observation, EEG tomorrow, stroke scale with neuro evaluated with 0, he did have some improvement from a strength standpoint but remained confused and not back to baseline  --Bladder scan with approximately 250 cc, will need to monitor to ensure urine retention not playing a role as some of the story sounds like overflow incontinence  --will call [facility] for MRI in am  --Discussed with medicine, will admit    Problems Addressed:  Altered mental status, unspecified altered mental status type: complicated acute illness or injury  TIA (transient ischemic attack): complicated acute illness or injury    Amount and/or Complexity of Data Reviewed  Labs: ordered.  Radiology: ordered.  ECG/medicine tests: ordered. Decision-making details documented in ED Course.    Risk  Prescription drug management.        Final diagnoses:   Altered mental status, unspecified altered mental status type   TIA (transient ischemic attack)       ED Disposition  ED Disposition       ED Disposition   Decision to Admit    Condition   --    Comment   --               No follow-up provider specified.       Medication List      No changes were made to your prescriptions during this visit.            Richmond Howe DO  12/18/24 1758      Electronically signed by Richmond Howe DO at 12/18/24 1753       Facility-Administered Medications as of 12/19/2024   Medication Dose Route Frequency Provider Last Rate Last Admin    acetaminophen (TYLENOL) tablet 650 mg  650 mg Oral Q4H PRN Behbahani, Katayoun, MD        Or    acetaminophen (TYLENOL) 160 MG/5ML oral solution 650 mg  650 mg Oral Q4H PRN Behbahani, Katayoun, MD         Or    acetaminophen (TYLENOL) suppository 650 mg  650 mg Rectal Q4H PRN Behbahani, Katayoun, MD        aspirin EC tablet 81 mg  81 mg Oral Daily Behbahani, Katayoun, MD   81 mg at 12/18/24 2104    atorvastatin (LIPITOR) tablet 40 mg  40 mg Oral Nightly Behbahani, Katayoun, MD   40 mg at 12/18/24 2104    bicalutamide (CASODEX) tablet 50 mg  50 mg Oral QAM Behbahani, Katayoun, MD        carvedilol (COREG) tablet 6.25 mg  6.25 mg Oral BID With Meals Behbahani, Katayoun, MD   6.25 mg at 12/18/24 2104    dextrose (D50W) (25 g/50 mL) IV injection 25 g  25 g Intravenous Q15 Min PRN Behbahani, Katayoun, MD        dextrose (GLUTOSE) oral gel 15 g  15 g Oral Q15 Min PRN Behbahani, Katayoun, MD        Enoxaparin Sodium (LOVENOX) syringe 40 mg  40 mg Subcutaneous Nightly Behbahani, Katayoun, MD   40 mg at 12/18/24 2104    glucagon HCl (Diagnostic) injection 1 mg  1 mg Intramuscular Q15 Min PRN Behbahani, Katayoun, MD        insulin glargine (LANTUS, SEMGLEE) injection 10 Units  10 Units Subcutaneous Nightly Behbahani, Katayoun, MD   10 Units at 12/18/24 2157    Insulin Lispro (humaLOG) injection 2-7 Units  2-7 Units Subcutaneous 4x Daily AC & at Bedtime Behbahani, Katayoun, MD   4 Units at 12/18/24 2157    [COMPLETED] iopamidol (ISOVUE-370) 76 % injection 100 mL  100 mL Intravenous Once in imaging Richmond Howe,    140 mL at 12/18/24 1553    naproxen (NAPROSYN) tablet 250 mg  250 mg Oral BID With Meals Behbahani, Katayoun, MD   250 mg at 12/18/24 2157    nitroglycerin (NITROSTAT) SL tablet 0.4 mg  0.4 mg Sublingual Q5 Min PRN Behbahani, Katayoun, MD        ondansetron (ZOFRAN) injection 4 mg  4 mg Intravenous Q6H PRN Behbahani, Katayoun, MD        pantoprazole (PROTONIX) EC tablet 40 mg  40 mg Oral Q AM Behbahani, Katayoun, MD   40 mg at 12/19/24 0624    sodium chloride 0.9 % flush 10 mL  10 mL Intravenous PRN Richmond Howe DO        sodium chloride 0.9 % flush 10 mL  10 mL Intravenous Q12H  Behbahani, Katayoun, MD   10 mL at 12/18/24 2209    sodium chloride 0.9 % flush 10 mL  10 mL Intravenous PRN Behbahani, Katayoun, MD        sodium chloride 0.9 % infusion 40 mL  40 mL Intravenous PRN Behbahani, Katayoun, MD        tamsulosin (FLOMAX) 24 hr capsule 0.4 mg  0.4 mg Oral Nightly Behbahani, Katayoun, MD   0.4 mg at 12/18/24 2104    traMADol (ULTRAM) tablet 50 mg  50 mg Oral 4x Daily PRN Behbahani, Katayoun, MD         Orders (all)        Start     Ordered    12/19/24 0800  Oral Care  2 Times Daily       12/18/24 1944 12/19/24 0729  POC Glucose Once  PROCEDURE ONCE        Comments: Complete no more than 45 minutes prior to patient eating      12/19/24 0722    12/19/24 0700  bicalutamide (CASODEX) tablet 50 mg  Every Morning         12/18/24 1944 12/19/24 0600  Lipid Panel  Morning Draw         12/18/24 1715    12/19/24 0600  Sedimentation Rate  Morning Draw         12/18/24 1715    12/19/24 0600  Vitamin B12  Morning Draw         12/18/24 1715    12/19/24 0600  Folate  Morning Draw         12/18/24 1715    12/19/24 0600  PSA DIAGNOSTIC  Morning Draw         12/18/24 1715    12/19/24 0600  Basic Metabolic Panel  Morning Draw         12/18/24 1944 12/19/24 0600  pantoprazole (PROTONIX) EC tablet 40 mg  Every Early Morning         12/18/24 1944 12/19/24 0000  Adult Transthoracic Echo Complete W/ Cont if Necessary Per Protocol  Once        Comments: With bubble study    12/18/24 1713    12/19/24 0000  Duplex Carotid Ultrasound CAR  Once         12/18/24 1713    12/19/24 0000  EEG  Once        Comments: May perform bedside photic stimulation and hyperventilation    12/18/24 1716 12/18/24 2200  POC Glucose 4x Daily Before Meals & at Bedtime  4 Times Daily Before Meals & at Bedtime      Comments: Complete no more than 45 minutes prior to patient eating      12/18/24 1944    12/18/24 2138  POC Glucose Once  PROCEDURE ONCE        Comments: Complete no more than 45 minutes prior to patient eating       12/18/24 2131 12/18/24 2100  sodium chloride 0.9 % flush 10 mL  Every 12 Hours Scheduled         12/18/24 1944 12/18/24 2100  insulin glargine (LANTUS, SEMGLEE) injection 10 Units  Nightly         12/18/24 1944 12/18/24 2100  Insulin Lispro (humaLOG) injection 2-7 Units  4 Times Daily Before Meals & Nightly         12/18/24 1944 12/18/24 2100  Enoxaparin Sodium (LOVENOX) syringe 40 mg  Nightly         12/18/24 1944 12/18/24 2100  atorvastatin (LIPITOR) tablet 40 mg  Nightly         12/18/24 1944 12/18/24 2100  tamsulosin (FLOMAX) 24 hr capsule 0.4 mg  Nightly         12/18/24 1944 12/18/24 2030  aspirin EC tablet 81 mg  Daily         12/18/24 1944 12/18/24 2030  carvedilol (COREG) tablet 6.25 mg  2 Times Daily With Meals         12/18/24 1944 12/18/24 2030  naproxen (NAPROSYN) tablet 250 mg  2 Times Daily With Meals         12/18/24 1944 12/18/24 2000  Vital Signs  Every 4 Hours       12/18/24 1944 12/18/24 2000  Neuro Checks  Every 4 Hours       12/18/24 1944 12/18/24 2000  Strict Intake & Output  Every Hour       12/18/24 1944 12/18/24 1945  Intake & Output  Every Shift       12/18/24 1944 12/18/24 1945  Weigh Patient  Once         12/18/24 1944 12/18/24 1945  Insert Peripheral IV  Once         12/18/24 1944 12/18/24 1945  Saline Lock & Maintain IV Access  Continuous,   Status:  Canceled         12/18/24 1944 12/18/24 1945  Continuous Cardiac Monitoring  Continuous        Comments: Follow Standing Orders As Outlined in Process Instructions (Open Order Report to View Full Instructions)    12/18/24 1944 12/18/24 1945  Maintain IV Access  Continuous         12/18/24 1944 12/18/24 1945  Telemetry - Place Orders & Notify Provider of Results When Patient Experiences Acute Chest Pain, Dysrhythmia or Respiratory Distress  Continuous        Comments: Open Order Report to View Parameters Requiring Provider Notification    12/18/24 1944 12/18/24 1945  May Be Off  "Telemetry for Tests  Continuous         12/18/24 1944 12/18/24 1945  Bowel Regimen Not Indicated  Once         12/18/24 1944 12/18/24 1945  Diet: Regular/House, Diabetic; Consistent Carbohydrate; Feeding Assistance - Nursing; Texture: Soft to Chew (NDD 3); Soft to Chew: Chopped Meat; Fluid Consistency: Thin (IDDSI 0)  Diet Effective Now         12/18/24 1944 12/18/24 1945  Seizure Precautions  Continuous         12/18/24 1944 12/18/24 1945  Ambulate Patient  Every Shift       12/18/24 1944 12/18/24 1945  Fall Precautions  Continuous         12/18/24 1944 12/18/24 1945  EEG  Once,   Status:  Canceled         12/18/24 1944 12/18/24 1944  acetaminophen (TYLENOL) tablet 650 mg  Every 4 Hours PRN        Placed in \"Or\" Linked Group    12/18/24 1944 12/18/24 1944  acetaminophen (TYLENOL) 160 MG/5ML oral solution 650 mg  Every 4 Hours PRN        Placed in \"Or\" Linked Group    12/18/24 1944 12/18/24 1944  acetaminophen (TYLENOL) suppository 650 mg  Every 4 Hours PRN        Placed in \"Or\" Linked Group    12/18/24 1944 12/18/24 1944  dextrose (GLUTOSE) oral gel 15 g  Every 15 Minutes PRN         12/18/24 1944 12/18/24 1944  dextrose (D50W) (25 g/50 mL) IV injection 25 g  Every 15 Minutes PRN         12/18/24 1944 12/18/24 1944  glucagon HCl (Diagnostic) injection 1 mg  Every 15 Minutes PRN         12/18/24 1944 12/18/24 1944  traMADol (ULTRAM) tablet 50 mg  4 Times Daily PRN         12/18/24 1944 12/18/24 1944  sodium chloride 0.9 % flush 10 mL  As Needed         12/18/24 1944 12/18/24 1944  sodium chloride 0.9 % infusion 40 mL  As Needed         12/18/24 1944 12/18/24 1944  nitroglycerin (NITROSTAT) SL tablet 0.4 mg  Every 5 Minutes PRN         12/18/24 1944 12/18/24 1944  ondansetron (ZOFRAN) injection 4 mg  Every 6 Hours PRN         12/18/24 1944    12/18/24 1931  Inpatient Case Management  Consult  Once        Provider:  (Not yet assigned)    12/18/24 1930 "    12/18/24 1841  Code Status and Medical Interventions: CPR (Attempt to Resuscitate); Full Support  Continuous         12/18/24 1841    12/18/24 1805  PT Consult: Eval & Treat Discharge Placement Assessment, Functional Mobility Below Baseline  Once         12/18/24 1805    12/18/24 1805  OT Consult: Eval & Treat ADL Performance Below Baseline, Discharge Placement Assessment  Once         12/18/24 1805    12/18/24 1805  SLP Consult: Eval & Treat Other, Communication Disorder; cognition  Once         12/18/24 1805    12/18/24 1757  Call [facility] and have rep present tomorrow for MRI-AICD  Nursing Communication  Continuous        Comments: Call [facility] and have rep present tomorrow for MRI-AICD    12/18/24 1757    12/18/24 1756  ABO RH Specimen Verification  STAT         12/18/24 1755    12/18/24 1755  Inpatient Admission  Once         12/18/24 1804    12/18/24 1721  Diet: Cardiac, Diabetic; Healthy Heart (2-3 Na+); Consistent Carbohydrate; Fluid Consistency: Thin (IDDSI 0)  Diet Effective Now,   Status:  Canceled         12/18/24 1720    12/18/24 1718  Keep the systolic blood pressure between 1 20-1 40 and the diastolic between 80-90  Nursing Communication  Once        Comments: Keep the systolic blood pressure between 1 20-1 40 and the diastolic between 80-90    12/18/24 1717    12/18/24 1655  Fentanyl, Urine - Urine, Clean Catch  Once         12/18/24 1654    12/18/24 1609  iopamidol (ISOVUE-370) 76 % injection 100 mL  Once in Imaging         12/18/24 1553    12/18/24 1607  POC Creatinine  PROCEDURE ONCE         12/18/24 1534    12/18/24 1600  Neuro Checks  Every 2 Hours      Comments: On arrival and handoff, increase frequency as clinically indicated or for thrombolytic administration, otherwise Q2 hours. Documentation of arrival assessment and any neuro change required.    12/18/24 1517    12/18/24 1554  Type & Screen  Once         12/18/24 1517    12/18/24 1547  aPTT  Once         12/18/24  1517    12/18/24 1524  Blood Gas, Arterial With Co-Ox  PROCEDURE ONCE         12/18/24 1521    12/18/24 1518  Initiate Department's Acute Stroke Process (Team D, Code 19, etc.)  Once         12/18/24 1517    12/18/24 1518  Inpatient Neurology Consult Stroke  Once        Specialty:  Neurology  Provider:  Yan Haney MD    12/18/24 1517    12/18/24 1518  Inpatient Neurology Consult Stroke  Once        Specialty:  Neurology  Provider:  Aldair Madsen APRN    12/18/24 1517    12/18/24 1518  Perform NIH Stroke Scale  Once        Comments: Perform NIH Stroke Scale frequency: Stat, prior to thrombolytic/intervention, repeat as needed per protocol.    12/18/24 1517    12/18/24 1518  Measure Actual Weight  Once         12/18/24 1517    12/18/24 1518  Notify Provider  Continuous        Comments: Open Order Report to View Parameters Requiring Provider Notification    12/18/24 1517    12/18/24 1518  Notify Provider for SBP Greater Than 140 for Hemorrhagic Stroke Patient  Continuous        Comments: Open Order Report to View Parameters Requiring Provider Notification    12/18/24 1517    12/18/24 1518  Head of Bed 30 Degrees or Less  Until Discontinued         12/18/24 1517    12/18/24 1518  Cardiac Monitoring  Continuous,   Status:  Canceled        Comments: Follow Standing Orders As Outlined in Process Instructions (Open Order Report to View Full Instructions)    12/18/24 1517    12/18/24 1518  Undress and Gown  Once         12/18/24 1517    12/18/24 1518  NPO Diet NPO Type: Strict NPO  Diet Effective Now,   Status:  Canceled         12/18/24 1517    12/18/24 1518  Cardiac Monitoring  Continuous,   Status:  Canceled        Comments: Follow Standing Orders As Outlined in Process Instructions (Open Order Report to View Full Instructions)    12/18/24 1517    12/18/24 1518  Continuous Pulse Oximetry  Continuous         12/18/24 1517    12/18/24 1518  Vital Signs  Per Hospital Policy        Comments: Every 30 minutes -  Increase frequency as clinically indicated or for thrombolytic administration.    12/18/24 1517    12/18/24 1518  No Hypotonic Fluids  Until Discontinued         12/18/24 1517    12/18/24 1518  Nursing Dysphagia Screening (Complete Prior to Giving anything PO)  Once         12/18/24 1517    12/18/24 1518  RN to Place Order SLP Consult (IF swallow screen failed) - Eval & Treat Choosing Reason of RN Dysphagia Screen Failed  Once         12/18/24 1517    12/18/24 1518  CT Head Without Contrast Stroke Protocol  1 Time Imaging         12/18/24 1517    12/18/24 1518  CT Angiogram Head w AI Analysis of LVO  1 Time Imaging        Comments: Neuro deficit, acute, stroke suspected    12/18/24 1517    12/18/24 1518  CT Angiogram Neck  1 Time Imaging        Comments: Neuro deficit, acute, stroke suspected    12/18/24 1517    12/18/24 1518  CT CEREBRAL PERFUSION WITH & WITHOUT CONTRAST  1 Time Imaging        Comments: Neuro deficit, acute, stroke suspected    12/18/24 1517    12/18/24 1518  XR Chest 1 View  1 Time Imaging        Comments: Do not delay CT Head to obtain    12/18/24 1517    12/18/24 1518  ECG 12 Lead Stroke Evaluation  Once        Comments: Do not delay CT Head to obtain    12/18/24 1517    12/18/24 1518  Insert Large-Bore Peripheral IV - RIGHT AC Preferred  Once         12/18/24 1517    12/18/24 1518  [place] Draw  Once,   Status:  Canceled         12/18/24 1517    12/18/24 1518  POC Glucose Once  Once,   Status:  Canceled        Comments: Complete no more than 45 minutes prior to patient eating      12/18/24 1517    12/18/24 1518  CBC & Differential  Once,   Status:  Canceled         12/18/24 1517    12/18/24 1518  Comprehensive Metabolic Panel  Once,   Status:  Canceled         12/18/24 1517    12/18/24 1518  Protime-INR  Once,   Status:  Canceled         12/18/24 1517    12/18/24 1518  Green Top (Gel)  PROCEDURE ONCE,   Status:  Canceled         12/18/24 1517    12/18/24 1518  Lavender Top  PROCEDURE ONCE,    Status:  Canceled         12/18/24 1517    12/18/24 1518  Gold Top - SST  PROCEDURE ONCE,   Status:  Canceled         12/18/24 1517    12/18/24 1518  Light Blue Top  PROCEDURE ONCE,   Status:  Canceled         12/18/24 1517    12/18/24 1518  CBC Auto Differential  PROCEDURE ONCE,   Status:  Canceled         12/18/24 1517    12/18/24 1517  sodium chloride 0.9 % flush 10 mL  As Needed         12/18/24 1517    12/18/24 1500  Blood Gas, Arterial -With Co-Ox Panel: Yes  Once         12/18/24 1459    12/18/24 1459  Cardiac Monitoring  Continuous,   Status:  Canceled        Comments: Follow Standing Orders As Outlined in Process Instructions (Open Order Report to View Full Instructions)    12/18/24 1459    12/18/24 1459  Continuous Pulse Oximetry  Continuous,   Status:  Canceled         12/18/24 1459    12/18/24 1459  Vital Signs  Per Hospital Policy         12/18/24 1459    12/18/24 1459  Insert Peripheral IV  Once         12/18/24 1459    12/18/24 1459  Fall Precautions  Continuous         12/18/24 1459    12/18/24 1459  XR Chest 1 View  1 Time Imaging,   Status:  Canceled         12/18/24 1459    12/18/24 1459  POC Glucose Once  Once        Comments: Complete no more than 45 minutes prior to patient eating      12/18/24 1459    12/18/24 1459  [place] Draw  Once         12/18/24 1459    12/18/24 1459  CBC & Differential  Once         12/18/24 1459    12/18/24 1459  Comprehensive Metabolic Panel  Once         12/18/24 1459    12/18/24 1459  Protime-INR  Once         12/18/24 1459    12/18/24 1459  Urinalysis With Culture If Indicated - Urine, Catheter  Once         12/18/24 1459    12/18/24 1459  C-reactive Protein  STAT         12/18/24 1459    12/18/24 1459  Procalcitonin  Once         12/18/24 1459    12/18/24 1459  Urine Drug Screen - Urine, Clean Catch  STAT         12/18/24 1459    12/18/24 1459  TSH  Once         12/18/24 1459    12/18/24 1459  Blood Culture - Blood, Arm, Left  STAT         12/18/24 0294     24 1459  Blood Culture - Blood, Arm, Right  STAT         24 1459    24 1459  Green Top (Gel)  PROCEDURE ONCE         24 1459    24 1459  Lavender Top  PROCEDURE ONCE         24 1459    24 1459  Gold Top - SST  PROCEDURE ONCE         24 1459    24 1459  Light Blue Top  PROCEDURE ONCE         24 1459    24 1459  CBC Auto Differential  PROCEDURE ONCE         24 1459    24 1458  Oxygen Therapy- Nasal Cannula; Titrate 1-6 LPM Per SpO2; 90 - 95%  Continuous PRN,   Status:  Canceled       24 1459    24 1458  sodium chloride 0.9 % flush 10 mL  As Needed,   Status:  Discontinued         24 1459    24 0000  Telemetry Scan  Once         24 0000    Unscheduled  Oxygen Therapy- Nasal Cannula; Titrate 1-6 LPM Per SpO2; 90 - 95%  Continuous PRN       24 1517    Unscheduled  Follow Hypoglycemia Standing Orders For Blood Glucose <70 & Notify Provider of Treatment  As Needed      Comments: Follow Hypoglycemia Orders As Outlined in Process Instructions (Open Order Report to View Full Instructions)  Notify Provider Any Time Hypoglycemia Treatment is Administered    24    --  carvedilol (COREG) 6.25 MG tablet  2 Times Daily With Meals         24    --  nabumetone (RELAFEN) 500 MG tablet  2 Times Daily         24 183                  Physician Progress Notes (all)    No notes of this type exist for this encounter.          Consult Notes (all)        Yan Haney MD at 24 1720        Consult Orders    1. Inpatient Neurology Consult Stroke [[id number]] ordered by Richmond Howe DO at 24 151    2. Inpatient Neurology Consult Stroke [[id number]] ordered by Richmond Howe DO at 24 1517                 DOS: 2024  NAME: Teddy De Leon   : 1940  PCP: Roman Lebron DO  CC: Altered mentation since 9 AM  Referring MD: Richmond Howe  DO    Neurological Problem and Interval History:  84 y.o. right-handed white male with a Hx of prostate cancer for which she had received radiation therapy in the last 1 and 2009 and has been declared cancer free was found around 9 AM to be staring at his wife and not verbally communicating.  She then woke him up and he got up and he wanted to use the bathroom and at that point it was noticeable he just urinated on the floor without being able to reach the bathroom.  There was no tonic-clonic convulsions noted or any kind of tongue bite but he seemed to be staring off into space.  He has been on tramadol for quite some time which can lower seizure threshold but he has not had any kind of seizures in the past.  He usually takes a tramadol at bedtime.  When I saw him through the telemedicine device and noticed he is awake and alert and oriented to himself and knows his date of birth and knows his wife at the bedside and knows where he is and upon cueing he was able to tell me the correct month but he could not tell me the correct year.  He was able to follow one-step and two-step commands well.  He was able to read aloud most of the words and phrases presented to him and also recognize most of the pictures and a complex picture.  There is no facial droop noticeable and his speech is clear and intact.  There is no visual loss noted but he usually uses corrective lenses.  He is hard of hearing.  There is no drift noticeable in the upper or lower extremities and the finger-to-nose coordination and heel-to-shin testing are all intact.  There is no loss of sensations on either side of the face or the upper and lower extremities and no extinction on double simultaneous presentation of noxious stimulation.  He was able to sit up by the side of the bed independently and stood up with some assistance as he usually walks with a cane or a walker because of back injury.  He passed bedside swallow.    Past Medical/Surgical  Hx:  Past Medical History:   Diagnosis Date    Arthritis     sites varies     Coronary artery disease 1998    stent inserted by Dr Palencia     Diabetes mellitus     insulin and po meds     GERD (gastroesophageal reflux disease)     Hyperlipidemia     Injury of back     Iron deficiency anemia 10/31/2016    Myocardial infarction 1998    Prostate cancer     radiation treatment -no surgery     Wears glasses      Past Surgical History:   Procedure Laterality Date    CARDIAC CATHETERIZATION      CARDIAC ELECTROPHYSIOLOGY PROCEDURE N/A 09/20/2018    Procedure: Implant ICD - bi ventricular;  Surgeon: Nas Quevedo DO;  Location: [facility] INVASIVE LOCATION;  Service: Cardiology    CATARACT EXTRACTION, BILATERAL      April 2024 and May 2024    COLONOSCOPY      CORONARY STENT PLACEMENT      FRACTURE SURGERY Right     rt arm repair     SKIN BIOPSY  1993    nose -small area       Review of Systems:    Constitutional: Pleasant gentleman currently laying in the gurney in no distress  Cardiovascular: No chest pain or palpitations noted.  Respiratory: No shortness of breath noted.  Gastrointestinal: No nausea and vomiting noted.  Genitourinary: No bladder incontinence noted.  However he usually uses adult diapers.  Musculoskeletal: No aches and pains in the muscles or joints noted.  Dermatological: No skin breakdown noted.  Neurological: NIH stroke scale of 0  Psychiatric: No major anxiety or depression noted.  Ophthalmological: No visual changes noted.          Medications On Admission  (Not in a hospital admission)      Prior to Admission medications    Medication Sig Start Date End Date Taking? Authorizing Provider   aspirin 81 MG EC tablet Take 1 tablet by mouth Daily. 11/2/16   Raman Rae MD   atorvastatin (LIPITOR) 40 MG tablet Take 1 tablet by mouth Every Night.    Provider, MD Rosalina   bicalutamide (CASODEX) 50 MG tablet Take 1 tablet by mouth Every Morning. 12/10/24   Roman Lebron DO   carvedilol (COREG)  12.5 MG tablet Take 1 tablet by mouth 2 (Two) Times a Day.  Patient taking differently: Take 1 tablet by mouth Daily. 20   Faviola Bonner PA   Continuous Blood Gluc Sensor (Dexcom G7 Sensor) misc 1 each Every 10 (Ten) Days.    Rosalina Adhikari MD   glimepiride (AMARYL) 2 MG tablet Take 1 tablet by mouth 2 (Two) Times a Day. 23   Carlotta Moncada APRN   Glucagon HCl (Glucagon Emergency) 1 MG/ML reconstituted solution Inject 1 mg as directed As Needed (for severe low blood sugar). 2/15/23   Carlotta Moncada APRN   Insulin Glargine (Lantus SoloStar) 100 UNIT/ML injection pen Inject 20-45 Units under the skin into the appropriate area as directed Daily. 20-25 units twice daily    Rosalina Adhikari MD   lisinopril (PRINIVIL,ZESTRIL) 10 MG tablet Take 1 tablet by mouth Daily. 18   Clayton Dickinson MD   metFORMIN (GLUCOPHAGE) 500 MG tablet Take 2 tablets by mouth 2 (Two) Times a Day.    Rosalina Adhikari MD   omeprazole (priLOSEC) 20 MG capsule Take 1 capsule by mouth Every Morning. 12/10/24   Roman Lebron DO   tamsulosin (FLOMAX) 0.4 MG capsule 24 hr capsule Take 1 capsule by mouth Every Night.    Rosalina Adhikari MD   traMADol (ULTRAM) 50 MG tablet Take 1 tablet by mouth 4 (Four) Times a Day As Needed for Moderate Pain.    Rosalina Adhikari MD        Allergies:  No Known Allergies    Social Hx:  Social History     Socioeconomic History    Marital status:    Tobacco Use    Smoking status: Former     Current packs/day: 0.00     Average packs/day: 2.0 packs/day for 19.0 years (38.0 ttl pk-yrs)     Types: Cigarettes     Start date:      Quit date:      Years since quittin.9    Smokeless tobacco: Former     Types: Chew     Quit date:    Vaping Use    Vaping status: Never Used   Substance and Sexual Activity    Alcohol use: No    Drug use: No    Sexual activity: Defer       Family Hx:  Family History   Problem Relation Age of Onset    Cancer Mother          breast    Heart attack Mother     Hypertension Father     Breast cancer Sister     Breast cancer Sister     Breast cancer Sister     No Known Problems Sister     No Known Problems Brother        Review of Imaging (Interpretation of images not reports): The CT angiogram of the head and neck with contrast was reviewed in details as follows:    CT Angiography Head With Intravenous Contrast     EXAM DATE:    12/18/2024 3:51 PM     CLINICAL HISTORY:    Stroke, follow up     TECHNIQUE:    Axial computed tomographic angiography images of the head with  intravenous contrast. LVO analysis was performed with [employer] software.   This CT exam was performed using one or more of the following dose  reduction techniques:  automated exposure control, adjustment of the mA  and/or kV according to patient size, and/or use of iterative  reconstruction technique.    MIP reconstructed images were created and reviewed.     COMPARISON:    No relevant prior studies available.     FINDINGS:    RIGHT INTERNAL CAROTID ARTERY:  No acute findings.  Intracranial  segment is patent with no significant stenosis.  No aneurysm.    RIGHT ANTERIOR CEREBRAL ARTERY:  Unremarkable.  No occlusion or  significant stenosis.  No aneurysm.    RIGHT MIDDLE CEREBRAL ARTERY:  Unremarkable.  No occlusion or  significant stenosis.  No aneurysm.    RIGHT POSTERIOR CEREBRAL ARTERY:  Unremarkable.  No occlusion or  significant stenosis.  No aneurysm.    RIGHT VERTEBRAL ARTERY:  Unremarkable as visualized.       LEFT INTERNAL CAROTID ARTERY:  No acute findings.  Intracranial  segment is patent with no significant stenosis.  No aneurysm.    LEFT ANTERIOR CEREBRAL ARTERY:  Unremarkable.  No occlusion or  significant stenosis.  No aneurysm.    LEFT MIDDLE CEREBRAL ARTERY:  Unremarkable.  No occlusion or  significant stenosis.  No aneurysm.    LEFT POSTERIOR CEREBRAL ARTERY:  Unremarkable.  No occlusion or  significant stenosis.  No aneurysm.    LEFT VERTEBRAL ARTERY:   Unremarkable as visualized.       BASILAR ARTERY:  Unremarkable.  No occlusion or significant stenosis.   No aneurysm.    OTHER FINDINGS:  No hemodynamically significant stenosis or aneurysm  identified.     IMPRESSION:    No hemodynamically significant stenosis or aneurysm identified.    CT Head Without Contrast Stroke Protocol    Result Date: 12/18/2024  CT HEAD WO CONTRAST STROKE PROTOCOL-  CLINICAL INDICATION: Neuro deficit, acute, stroke suspected   COMPARISON: 10/28/2016  TECHNIQUE: Axial images of the brain were obtained with out intravenous contrast.  Reformatted images were created in the sagittal and coronal planes.  DOSE:  Radiation dose reduction techniques were utilized per ALARA protocol. Automated exposure control was initiated through either or [employer] or [employer] software packages by  protocol.    FINDINGS:   BRAIN:  Unremarkable.  No hemorrhage.  No significant white matter disease.  No edema.    VENTRICLES:  Unremarkable.  No ventriculomegaly.    BONES/JOINTS:  Unremarkable.  No acute fracture.    SOFT TISSUES:  Unremarkable.    SINUSES:  no air fluid levels    MASTOID AIR CELLS:  Unremarkable as visualized.  No mastoid effusion.       Impression:  1. No evidence of an acute ischemic event 2. No parenchymal mass, hemorrhage, or midline shift 3. Results were relayed to the emergency department at 3:29 p.m.  This report was finalized on 12/18/2024 3:30 PM by Dr. Milad Wong MD.              Additional Tests Performed: Reviewed the CT perfusion which is not showing any mismatch.    Results for orders placed during the hospital encounter of 09/20/18    Adult Transthoracic Echo Limited W/ Cont if Necessary Per Protocol    Interpretation Summary  · Mild mitral valve regurgitation is present.  · Mild aortic valve regurgitation is present.  · The following left ventricular wall segments are hypokinetic: mid anterior, apical anterior, basal anterolateral, mid anterolateral, apical lateral,  basal inferolateral, mid inferolateral, apical inferior, mid inferior, basal inferoseptal, basal anterior, basal inferior and basal inferoseptal. The following left ventricular wall segments are dyskinetic: apical septal, mid inferoseptal, mid anteroseptal and apex.  · The left ventricular cavity is mild-to-moderately dilated.  · Left ventricular systolic function is severely decreased. Estimated EF = 25%.  · Left atrial cavity size is mild-to-moderately dilated.  · There is no evidence of pericardial effusion.  · No evidence of pulmonary hypertension is present.  · Mild MAC is present.  · The aortic valve exhibits sclerosis.  · There is no evidence of a left ventricular mass or thrombus present.  · Saline test for shunting not performed.            Laboratory Results:   Lab Results   Component Value Date    GLUCOSE 251 (H) 12/18/2024    CALCIUM 9.3 12/18/2024     (L) 12/18/2024    K 4.3 12/18/2024    CO2 25.5 12/18/2024    CL 96 (L) 12/18/2024    BUN 17 12/18/2024    CREATININE 0.97 12/18/2024    EGFRIFNONA 73 09/16/2018    BCR 17.5 12/18/2024    ANIONGAP 11.5 12/18/2024     Lab Results   Component Value Date    WBC 5.69 12/18/2024    HGB 11.9 (L) 12/18/2024    HCT 39.0 12/18/2024    MCV 99.7 (H) 12/18/2024    PLT 94 (L) 12/18/2024     Lab Results   Component Value Date    CHOL 112 10/30/2016     Lab Results   Component Value Date    HDL 37 (L) 11/19/2024    HDL 25 (L) 10/30/2016     Lab Results   Component Value Date     (H) 11/19/2024    LDL 68 10/30/2016     Lab Results   Component Value Date    TRIG 127 11/19/2024    TRIG 95 10/30/2016     Lab Results   Component Value Date    HGBA1C 9.50 (H) 11/19/2024     Lab Results   Component Value Date    INR 0.95 12/18/2024    INR 0.96 09/16/2018    PROTIME 12.7 12/18/2024    PROTIME 10.1 09/16/2018     Lab Results   Component Value Date    [id number] 458 10/30/2016     Lab Results   Component Value Date    FOLATE 18.00 10/30/2016     TSH          12/18/2024  "   15:36   TSH   TSH 0.569           Physical Examination:  /82   Pulse 80   Temp 98.8 °F (37.1 °C) (Oral)   Resp 16   Ht 177.8 cm (70\")   Wt 82.8 kg (182 lb 9.6 oz)   SpO2 92%   BMI 26.20 kg/m²   General Appearance:   Well developed, well nourished, well groomed, alert, and cooperative.  HEENT: Normocephalic.    Neck and Spine: Normal range of motion.  Normal alignment. No mass or tenderness. No bruits.    Extremities:    No edema or deformities. Normal joint ROM.   Skin:    No rashes or birth marks.    Neurological examination:  Higher Integrative  Function: Oriented to time, place and person.  Although he was not able to tell me the correct year he was able to follow one-step and two-step commands and read aloud common words and phrases and also recognize common pictures and a complex picture without much problems..  CN II:  Pupils are equal, round, and reactive to light. Normal visual acuity and visual fields.    CN III IV VI: Extraocular movements are full without nystagmus.   CN V:  Normal facial sensation and strength of muscles of mastication.  CN VII:  Facial movements are symmetric. No weakness.  CN VIII: Auditory acuity is normal.  CN IX & X: Symmetric palatal movement.  CN XI:  Sternocleidomastoid and trapezius are normal.  No weakness.  CN XII:  The tongue is midline.  No atrophy or fasciculations.  Motor:  Normal muscle strength, bulk and tone in upper and lower extremities.  No fasciculations, rigidity, spasticity, or abnormal movements.  Sensation: Normal to light touch, pinprick, vibration, temperature, and proprioception in arms and legs. Normal graphesthesia and no extinction on DSS.  Station and Gait: He was able to sit up by the side of the bed independently and stand up with help because he has a chronic low back pain issue for which he usually uses a cane or a walker at home..    Coordination:  Finger to nose test shows no dysmetria. Heel to shin " normal.    NIHSS:    Baseline  0-->Alert: keenly responsive  0-->Answers both questions correctly  0-->Performs both tasks correctly  0=normal  0=No visual loss  0=Normal symmetric movement  0-->No drift: limb holds 90 (or 45) degrees for full 10 secs  0-->No drift: limb holds 90 (or 45) degrees for full 10 secs  0-->No drift: limb holds 90 (or 45) degrees for full 10 secs  0-->No drift: limb holds 90 (or 45) degrees for full 10 secs  0=Absent  0=Normal; no sensory loss  0=No aphasia, normal  0=Normal  0=No abnormality    Total score: 0    Diagnoses / Discussion:  84 y.o. who presents with Sx of confusion which could be postictal as he was staring off into space.  Patient has been on tramadol at bedtime for quite some time but this can lower seizure threshold.    Plan:  Continue his home medications as before except for the metformin which has to be held for at least 48 hours after administration of intravenous contrast iodinated dye.  Patient will be unable to get an MRI because he has got an AICD placed in 2018.  Transthoracic echocardiogram with bubble study for tomorrow to look for any PFO as well as ejection fraction as the previous 1 in 2018 was only 25%.  Carotid ultrasound to look for any soft plaques in the internal carotid arteries.  EEG with photic stimulation and hyperventilation to look for any unusual slowing in the left temporal area  Lipid profile, TSH, sed rate, hemoglobin A1c, vitamin B12, PSA and folic acid levels for tomorrow morning.  Have physical therapy/Occupational Therapy/speech pathology evaluate him to make sure he would be safe to go home independently with his family.  Gently hydrate the patient.  Because he passed his bedside swallow he should be on a carbohydrate controlled cardiac diet.  Keep the systolic blood pressure between 1 20-1 40 and the diastolic between 70-80..     I have discussed the above with the patient, Dr. Howe the emergency room physician and family.  He will be  followed up tomorrow by inpatient teleneurology service along with ZBIGNIEW Daugherty  Time spent with patient: 70 minutes in face-to-face evaluation and management of the patient using the dedicated telemedicine device without any interruption with the help of the emergency room nurse with the patient located at the [facility] and myself at a remote location.    Electronically signed by Yan Haney MD, 12/18/24, 5:20 PM EST.    Dictated using Dragon dictation.                Electronically signed by Yan Haney MD at 12/18/24 [id number]

## 2024-12-19 NOTE — DISCHARGE SUMMARY
[facility] Medicine Services  DISCHARGE SUMMARY    Patient Identification:  Name:  Teddy De Leon  Age:  84 y.o.  Sex:  male  :  1940  MRN:  [id number]  Visit Number:  [id number]    Date of Admission: 2024  Date of Discharge:  2024    PCP: Roman Lebron,       Admission/Discharge Diagnoses     Discharge Diagnoses:  Acute neurologic encephalopathy  CVA/TIA ruled out  Possible seizure due to tramadol  Possible delirium  Possible new onset mild dementia    Consults/Procedures     Consults:   Consults       Date and Time Order Name Status Description    2024  8:46 AM Inpatient Cardiology Consult      2024  3:17 PM Inpatient Neurology Consult Stroke Completed     2024  3:17 PM Inpatient Neurology Consult Stroke Completed             Procedures Performed:  EEG-generalized slowing, negative for epileptic activity    History of Presenting Illness   Patient is an 84-year-old male with history of ischemic cardiomyopathy status post AICD placement in , CAD with history of PCI who presented to the emergency room this afternoon due to altered mental status.  Wife provides most of the history.  Reports patient went to primary care provider yesterday and was told all of his labs were normal and discussed some additional treatment for management of his diabetes.  This morning when patient woke up he appeared to be confused and was not able to get out of bed and had urinary incontinence which is very unusual for him.  Throughout the day he had difficulty ambulating and following directions and appeared very confused compared to his baseline therefore she brought him to the emergency room.  In the emergency room there were some concern for possible left-sided weakness as well as orientation deficits.  Wife denies any recent illness, fevers, chills, nausea, vomiting or bleeding.     Hospital Course     Patient was admitted due to acute encephalopathy for  further workup.  By the time of evaluation in the ER patient had improved some however not back to baseline.  Through the night no events noted on telemetry.  This morning his mentation has improved significantly.  Per neurology exam he was able to ambulate without difficulties and strength of left side had significantly improved.  EEG did not show any epileptiform activities but did show overall slowing.  Neurology recommends stopping tramadol as can decrease seizure threshold.  He can continue home aspirin and statin.  Carotid ultrasound was completed pending final results echocardiogram with bubble studies were completed and pending final results these can be followed up with your neurology in outpatient setting.  PSA, FA and B12 are all normal, carotid US w/o hemodynamically significant stenosis, TTE with improved LVEF to 36-40%, no PFO.   Neurology is okay with him being discharged home with above recommendations.  Of note, family was worries about some residual confusion. He was re-assessed and no major change from morning noted. He was able to ambulate with CGA with MD and wife. It appears to me that he may be having a degree of delirium, likely can improve after d/c once returns to normal environment. Per daughter he has had episodic confusions in the past month. This may also be a sign of new onset dementia. Therefore, recommend f/u with PCP to assess and re-evaluate in a few months. Patient and family were advised against patient driving. Daughter states wife has not allowed patient to drive for a while now. Advised family to have near 24/7 supervision at home until f/u with PCP and follow PCP advise then based on re-assessment.     Discharge Vitals/Physical Examination     Vital Signs:  Temp:  [97.2 °F (36.2 °C)-99 °F (37.2 °C)] 97.7 °F (36.5 °C)  Heart Rate:  [70-82] 73  Resp:  [16-20] 18  BP: (102-160)/(46-90) 146/70  Mean Arterial Pressure (Non-Invasive) for the past 24 hrs (Last 3 readings):    Noninvasive MAP (mmHg)   12/19/24 1601 107   12/19/24 1108 103   12/19/24 0719 92     SpO2 Percentage    12/19/24 0719 12/19/24 1108 12/19/24 1601   SpO2: 97% 96% 97%     SpO2:  [91 %-98 %] 97 %  on   ;   Device (Oxygen Therapy): room air    Body mass index is 26.3 kg/m².  Wt Readings from Last 3 Encounters:   12/18/24 80.8 kg (178 lb 1.6 oz)   12/17/24 82.8 kg (182 lb 9.6 oz)   11/19/24 86.1 kg (189 lb 14.4 oz)         Physical Exam:  GEN: NAD  CV: RRR, systolic murmur unchanged  PULM: CTA, no wheeze or crackles, on room air  GI: Normal bowel sounds, soft and nontender  NEURO: No facial droop, left upper extremity lower extremity strength has improved from yesterday and near equal to the right side.  Time to respond to questions have returned to baseline.  SKIN: No peripheral edema, no new rashes  PSYCH: Alert and oriented x 3 with slight confusion regarding place    Pertinent Laboratory/Radiology Results     Pertinent Laboratory Results:        Results from last 7 days   Lab Units 12/19/24  0010   CHOLESTEROL mg/dL 145   TRIGLYCERIDES mg/dL 88   HDL CHOL mg/dL 39*   LDL CHOL mg/dL 89     Results from last 7 days   Lab Units 12/18/24  1521   PH, ARTERIAL pH units 7.459*   PO2 ART mm Hg 65.6*   PCO2, ARTERIAL mm Hg 37.2   HCO3 ART mmol/L 26.4*     Results from last 7 days   Lab Units 12/18/24  1536   CRP mg/dL 3.31*   WBC 10*3/mm3 5.69   HEMOGLOBIN g/dL 11.9*   HEMATOCRIT % 39.0   MCV fL 99.7*   MCHC g/dL 30.5*   PLATELETS 10*3/mm3 94*   INR  0.95     Results from last 7 days   Lab Units 12/19/24  0010 12/18/24  1536 12/18/24  1534   SODIUM mmol/L 131* 133*  --    POTASSIUM mmol/L 3.6 4.3  --    CHLORIDE mmol/L 95* 96*  --    CO2 mmol/L 22.9 25.5  --    BUN mg/dL 17 17  --    CREATININE mg/dL 0.92 0.97 1.00   CALCIUM mg/dL 9.2 9.3  --    GLUCOSE mg/dL 196* 251*  --    ALBUMIN g/dL  --  4.2  --    BILIRUBIN mg/dL  --  0.7  --    ALK PHOS U/L  --  65  --    AST (SGOT) U/L  --  11  --    ALT (SGPT) U/L  --  9  --   "  Estimated Creatinine Clearance: 70.7 mL/min (by C-G formula based on SCr of 0.92 mg/dL).  No results found for: \"AMMONIA\"    Glucose   Date/Time Value Ref Range Status   12/19/2024 1605 167 (H) 70 - 130 mg/dL Final   12/19/2024 1112 356 (H) 70 - 130 mg/dL Final   12/19/2024 0722 181 (H) 70 - 130 mg/dL Final   12/18/2024 2131 291 (H) 70 - 130 mg/dL Final     Lab Results   Component Value Date    HGBA1C 9.50 (H) 11/19/2024     Lab Results   Component Value Date    TSH 0.569 12/18/2024       Pain Management Panel  More data exists         Latest Ref Rng & Units 12/18/2024 8/17/2023   Pain Management Panel   Creatinine, Urine mg/dL - 156.4    Amphetamine, Urine Qual Negative Negative  -   Barbiturates Screen, Urine Negative Negative  -   Benzodiazepine Screen, Urine Negative Negative  -   Buprenorphine, Screen, Urine Negative Negative  -   Cocaine Screen, Urine Negative Negative  -   Fentanyl, Urine Negative Negative  -   Methadone Screen , Urine Negative Negative  -   Methamphetamine, Ur Negative Negative  -      Details                   Pertinent Radiology Results:  Imaging Results (All)       Procedure Component Value Units Date/Time    US Carotid Bilateral [[id number]] Resulted: 12/19/24 1204     Updated: 12/19/24 1204    XR Chest 1 View [[id number]] Collected: 12/18/24 1602     Updated: 12/18/24 1605    Narrative:      XR CHEST 1 VW-     CLINICAL INDICATION: Acute Stroke Protocol (onset < 12 hrs)        COMPARISON: 11/28/2020     TECHNIQUE: Single frontal view of the chest.     FINDINGS:     LUNGS: Lungs are adequately aerated.      HEART AND MEDIASTINUM: Heart and mediastinal contours are unremarkable        SKELETON: Bony and soft tissue structures are unremarkable.             Impression:      No radiographic evidence of acute cardiac or pulmonary disease.           This report was finalized on 12/18/2024 4:03 PM by Dr. Milad Wong MD.       CT Angiogram Head w AI Analysis of LVO [[id number]] Collected: " 12/18/24 1601     Updated: 12/18/24 1604    Narrative:      EXAM:    CT Angiography Head With Intravenous Contrast     EXAM DATE:    12/18/2024 3:51 PM     CLINICAL HISTORY:    Stroke, follow up     TECHNIQUE:    Axial computed tomographic angiography images of the head with  intravenous contrast. LVO analysis was performed with [employer] software.   This CT exam was performed using one or more of the following dose  reduction techniques:  automated exposure control, adjustment of the mA  and/or kV according to patient size, and/or use of iterative  reconstruction technique.    MIP reconstructed images were created and reviewed.     COMPARISON:    No relevant prior studies available.     FINDINGS:    RIGHT INTERNAL CAROTID ARTERY:  No acute findings.  Intracranial  segment is patent with no significant stenosis.  No aneurysm.    RIGHT ANTERIOR CEREBRAL ARTERY:  Unremarkable.  No occlusion or  significant stenosis.  No aneurysm.    RIGHT MIDDLE CEREBRAL ARTERY:  Unremarkable.  No occlusion or  significant stenosis.  No aneurysm.    RIGHT POSTERIOR CEREBRAL ARTERY:  Unremarkable.  No occlusion or  significant stenosis.  No aneurysm.    RIGHT VERTEBRAL ARTERY:  Unremarkable as visualized.       LEFT INTERNAL CAROTID ARTERY:  No acute findings.  Intracranial  segment is patent with no significant stenosis.  No aneurysm.    LEFT ANTERIOR CEREBRAL ARTERY:  Unremarkable.  No occlusion or  significant stenosis.  No aneurysm.    LEFT MIDDLE CEREBRAL ARTERY:  Unremarkable.  No occlusion or  significant stenosis.  No aneurysm.    LEFT POSTERIOR CEREBRAL ARTERY:  Unremarkable.  No occlusion or  significant stenosis.  No aneurysm.    LEFT VERTEBRAL ARTERY:  Unremarkable as visualized.       BASILAR ARTERY:  Unremarkable.  No occlusion or significant stenosis.   No aneurysm.    OTHER FINDINGS:  No hemodynamically significant stenosis or aneurysm  identified.       Impression:        No hemodynamically significant stenosis or  aneurysm identified.     This report was finalized on 12/18/2024 4:02 PM by Dr. Milad Wong MD.       CT Angiogram Neck [[id number]] Collected: 12/18/24 1600     Updated: 12/18/24 1603    Narrative:      EXAM:    CT Angiography Neck With Intravenous Contrast     EXAM DATE:    12/18/2024 3:52 PM     CLINICAL HISTORY:    Stroke, follow up     TECHNIQUE:    Axial computed tomographic angiography images of the neck with  intravenous contrast.  This CT exam was performed using one or more of  the following dose reduction techniques:  automated exposure control,  adjustment of the mA and/or kV according to patient size, and/or use of  iterative reconstruction technique.    MIP reconstructed images were created and reviewed.     COMPARISON:    None.     FINDINGS:      VASCULATURE:    RIGHT COMMON CAROTID ARTERY:  Unremarkable.  No occlusion or  significant stenosis.  No dissection.    RIGHT INTERNAL CAROTID ARTERY:  Unremarkable.  Extracranial segment is  patent with no occlusion or significant stenosis.  No dissection.    RIGHT EXTERNAL CAROTID ARTERY:  Unremarkable.  No occlusion.    RIGHT VERTEBRAL ARTERY:  Unremarkable.  No occlusion or significant  stenosis.  No dissection.       LEFT COMMON CAROTID ARTERY:  Unremarkable.  No occlusion or  significant stenosis.  No dissection.    LEFT INTERNAL CAROTID ARTERY:  Unremarkable.  Extracranial segment is  patent with no occlusion or significant stenosis.  No dissection.    LEFT EXTERNAL CAROTID ARTERY:  Unremarkable.  No occlusion.    LEFT VERTEBRAL ARTERY:  Unremarkable.  No occlusion or significant  stenosis.  No dissection.      NECK:    BONES/JOINTS:  Unremarkable.  No acute fracture.    SOFT TISSUES:  Unremarkable.    LUNG APICES:  Clear.    OTHER FINDINGS:  No hemodynamically significant stenosis detected.      CAROTID STENOSIS REFERENCE USING NASCET CRITERIA:    % ICA stenosis = (1 - narrowest ICA diameter/diameter of distal  cervical ICA) x 100.    Mild - <50%  stenosis.    Moderate - 50-69% stenosis.    Severe - 70-94% stenosis.    Near occlusion - 95-99% stenosis.    Occluded - 100% stenosis.       Impression:        No hemodynamically significant stenosis detected.b     This report was finalized on 12/18/2024 4:01 PM by Dr. Milad Wong MD.       CT CEREBRAL PERFUSION WITH & WITHOUT CONTRAST [[id number]] Resulted: 12/18/24 1552     Updated: 12/18/24 1554    CT Head Without Contrast Stroke Protocol [[id number]] Collected: 12/18/24 1530     Updated: 12/18/24 1532    Narrative:      CT HEAD WO CONTRAST STROKE PROTOCOL-     CLINICAL INDICATION: Neuro deficit, acute, stroke suspected        COMPARISON: 10/28/2016     TECHNIQUE: Axial images of the brain were obtained with out intravenous  contrast.  Reformatted images were created in the sagittal and coronal  planes.     DOSE:     Radiation dose reduction techniques were utilized per ALARA protocol.  Automated exposure control was initiated through either or [employer] or  D[employer] software packages by  protocol.        FINDINGS:    BRAIN:  Unremarkable.  No hemorrhage.  No significant white matter  disease.  No edema.       VENTRICLES:  Unremarkable.  No ventriculomegaly.       BONES/JOINTS:  Unremarkable.  No acute fracture.       SOFT TISSUES:  Unremarkable.       SINUSES:  no air fluid levels       MASTOID AIR CELLS:  Unremarkable as visualized.  No mastoid effusion.          Impression:         1. No evidence of an acute ischemic event  2. No parenchymal mass, hemorrhage, or midline shift  3. Results were relayed to the emergency department at 3:29 p.m.     This report was finalized on 12/18/2024 3:30 PM by Dr. Milad Wong MD.               Test Results Pending at Discharge:  Pending Labs       Order Current Status    Blood Culture - Blood, Arm, Left Preliminary result    Blood Culture - Blood, Arm, Right Preliminary result            Discharge Disposition/Discharge Medications/Discharge Appointments      Discharge Disposition:   Home or Self Care    Condition at Discharge:  Stable     DME Prescribed at Discharge:  none    Discharge Diet:  Diet Instructions       Diet: Diabetic Diets; Consistent Carbohydrate; Soft to Chew (NDD 3); Chopped Meat; Thin (IDDSI 0)      Discharge Diet: Diabetic Diets    Diabetic Diet: Consistent Carbohydrate    Texture: Soft to Chew (NDD 3)    Soft to Chew: Chopped Meat    Fluid Consistency: Thin (IDDSI 0)            Discharge Activity:  Activity Instructions       Activity as Tolerated              Code Status While Inpatient:  Code Status and Medical Interventions: CPR (Attempt to Resuscitate); Full Support   Ordered at: 12/18/24 2523     Level Of Support Discussed With:    Health Care Surrogate     Code Status (Patient has no pulse and is not breathing):    CPR (Attempt to Resuscitate)     Medical Interventions (Patient has pulse or is breathing):    Full Support       Discharge Medications:     Discharge Medications        Continue These Medications        Instructions Start Date   aspirin 81 MG EC tablet   81 mg, Oral, Daily      atorvastatin 40 MG tablet  Commonly known as: LIPITOR   40 mg, Nightly      bicalutamide 50 MG tablet  Commonly known as: CASODEX   50 mg, Oral, Every Morning      carvedilol 6.25 MG tablet  Commonly known as: COREG   6.25 mg, 2 Times Daily With Meals      glimepiride 2 MG tablet  Commonly known as: AMARYL   2 mg, Oral, 2 Times Daily      Lantus SoloStar 100 UNIT/ML injection pen  Generic drug: Insulin Glargine   40 Units, Daily      metFORMIN 500 MG tablet  Commonly known as: GLUCOPHAGE   1,000 mg, 2 Times Daily      nabumetone 500 MG tablet  Commonly known as: RELAFEN   500 mg, 2 Times Daily      omeprazole 20 MG capsule  Commonly known as: priLOSEC   20 mg, Oral, Every Morning      tamsulosin 0.4 MG capsule 24 hr capsule  Commonly known as: FLOMAX   1 capsule, Nightly             Stop These Medications      traMADol 50 MG tablet  Commonly known as:  ULTRAM              Discharge Appointments:  Your Scheduled Appointments      Dec 31, 2024 1:30 PM  Hospital Follow Up with Roman Lebron DO  [facility] ([place]) [address] [place] [address] [phone]        Feb 18, 2025 2:30 PM  Office Visit with Roman Lebron DO  [facility] ([place]) [address] [place] [address] [phone]   Arrive 15 minutes prior to appointment.  If you are in need of a language or hearing  please call the Department.         Sep 05, 2025 1:45 PM  Follow Up with Jimbo Bentley MD  [facility] ([place]) [address] [place] [address] [place] [address] [phone]   -Bring photo ID, insurance card, and list of medications to appointment  -If testing was completed outside of [facility] then patient must bring images on a disc  -Copay will be collected at time of appointment  -Established patients should arrive 15 minutes prior to appointment                Additional Instructions for the Follow-ups that You Need to Schedule       Discharge Follow-up with PCP   As directed       Currently Documented PCP:    Roman Lebron DO    PCP Phone Number:    [phone]     Follow Up Details: 1 week to follow up on folate, B12 and PSA levels        Discharge Follow-up with Specified Provider: neurology to f/u on TTE w bubble and carotid US results and reassess regarding encephalopathy; 1 Month   As directed      To: neurology to f/u on TTE w bubble and carotid US results and reassess regarding encephalopathy   Follow Up: 1 Month                .Pending Labs at Discharge:  Pending Labs       Order Current Status    Blood Culture - Blood, Arm, Left Preliminary result    Blood Culture - Blood, Arm, Right Preliminary result           Katayoun Behbahani, MD  Hospitalist Service -- [facility]       12/19/24  16:37 EST    Discharge Time: >30 minutes to coordinate  care with specialist, educate and arrange for follow-up and discharge instructions.

## 2024-12-19 NOTE — PROGRESS NOTES
"Stroke Progress Note       Chief Complaint: AMS    Subjective    Subjective     Subjective:  No acute events reported overnight.  Patient reports feeling at his neurological baseline.  The events of yesterday were not completely clear to him but he knows his wife brought him to the hospital because he was acting \"strange\".  He is awake and alert, oriented to person, place, situation, and month but not year.    Review of Systems   Constitutional:  Negative for chills and fever.   Respiratory: Negative.     Cardiovascular: Negative.    Neurological:  Negative for seizures, facial asymmetry, speech difficulty, weakness, numbness and headaches.   Psychiatric/Behavioral: Negative.              Objective    Objective      Temp:  [97.2 °F (36.2 °C)-99 °F (37.2 °C)] 97.2 °F (36.2 °C)  Heart Rate:  [70-82] 76  Resp:  [16-20] 20  BP: (102-160)/(46-97) 142/78    Neurological Exam  Mental Status  Awake and alert. Oriented only to person, place and situation. Recent and remote memory are intact. Speech is normal. Language is fluent with no aphasia. Attention and concentration are normal. Fund of knowledge is appropriate for level of education.    Cranial Nerves  CN III, IV, VI: Extraocular movements intact bilaterally. Normal lids and orbits bilaterally.  CN VII: Full and symmetric facial movement.  CN IX, X: Palate elevates symmetrically  CN XI: Shoulder shrug strength is normal.  CN XII: Tongue midline without atrophy or fasciculations.    Motor  Decreased muscle bulk throughout. Normal muscle tone. No abnormal involuntary movements. Strength is 5/5 throughout all four extremities.    Sensory  Light touch is normal in upper and lower extremities.     Coordination  Right: Finger-to-nose normal.Left: Finger-to-nose normal.    Gait   Romberg is absent.      Physical Exam  Constitutional:       General: He is awake. He is not in acute distress.  HENT:      Head: Normocephalic.      Mouth/Throat:      Pharynx: Oropharynx is clear. "   Eyes:      General: Lids are normal.      Extraocular Movements: Extraocular movements intact.   Cardiovascular:      Rate and Rhythm: Normal rate.   Pulmonary:      Effort: Pulmonary effort is normal. No respiratory distress.   Skin:     General: Skin is warm and dry.   Neurological:      Mental Status: He is alert.      Motor: Motor strength is normal.     Coordination: Romberg sign negative.   Psychiatric:         Mood and Affect: Mood normal.         Speech: Speech normal.         Behavior: Behavior normal.         Hospital Meds:  Scheduled- aspirin, 81 mg, Oral, Daily  atorvastatin, 40 mg, Oral, Nightly  bicalutamide, 50 mg, Oral, QAM  carvedilol, 6.25 mg, Oral, BID With Meals  enoxaparin, 40 mg, Subcutaneous, Nightly  insulin glargine, 10 Units, Subcutaneous, Nightly  insulin lispro, 2-7 Units, Subcutaneous, 4x Daily AC & at Bedtime  naproxen, 250 mg, Oral, BID With Meals  pantoprazole, 40 mg, Oral, Q AM  sodium chloride, 10 mL, Intravenous, Q12H  tamsulosin, 0.4 mg, Oral, Nightly      Infusions-     PRNs-   acetaminophen **OR** acetaminophen **OR** acetaminophen    dextrose    dextrose    glucagon (human recombinant)    nitroglycerin    ondansetron    sodium chloride    sodium chloride    sodium chloride    traMADol    Results Review:    I reviewed the patient's new clinical results.    Imaging Results (Last 24 Hours)       Procedure Component Value Units Date/Time    US Carotid Bilateral [[id number]] Collected: 12/19/24 1259     Updated: 12/19/24 1302    Narrative:      EXAMINATION: US CAROTID BILATERAL-      Technique: Multiple real-time color Doppler images were acquired of  bilateral carotid arteries.     Stenosis measurements if obtained, were performed by the NASCET or  similar method.        CLINICAL INDICATION:     speech impairment; slurred speech;  R41.82-Altered mental status, unspecified; G45.9-Transient cerebral  ischemic attack, unspecified     COMPARISON:    None immediately available.      FINDINGS:       RIGHT:  No significant intimal thickening or plaque is noted. No occlusion.     RIGHT ICA PSV: 60 cm/s  RIGHT ICA EDV: 13.3 cm/s.  Right ICA/CCA Ratio: 0.9  Anterograde flow is demonstrated in RIGHT vertebral artery.     LEFT:  No significant intimal thickening or plaque is noted. No occlusion.     LEFT ICA PSV: 55 cm/s  LEFT EDV: 13 cm/s.  Left ICA/CCA Ratio: 0.6  Anterograde flow is demonstrated in LEFT vertebral artery.          Impression:      Impression:     No evidence of hemodynamically significant plaques or stenosis within  the carotid system at this time.        This report was finalized on 12/19/2024 1:00 PM by Dr. Milad Wong MD.       XR Chest 1 View [[id number]] Collected: 12/18/24 1602     Updated: 12/18/24 1605    Narrative:      XR CHEST 1 VW-     CLINICAL INDICATION: Acute Stroke Protocol (onset < 12 hrs)        COMPARISON: 11/28/2020     TECHNIQUE: Single frontal view of the chest.     FINDINGS:     LUNGS: Lungs are adequately aerated.      HEART AND MEDIASTINUM: Heart and mediastinal contours are unremarkable        SKELETON: Bony and soft tissue structures are unremarkable.             Impression:      No radiographic evidence of acute cardiac or pulmonary disease.           This report was finalized on 12/18/2024 4:03 PM by Dr. Milad Wong MD.       CT Angiogram Head w AI Analysis of LVO [[id number]] Collected: 12/18/24 1601     Updated: 12/18/24 1604    Narrative:      EXAM:    CT Angiography Head With Intravenous Contrast     EXAM DATE:    12/18/2024 3:51 PM     CLINICAL HISTORY:    Stroke, follow up     TECHNIQUE:    Axial computed tomographic angiography images of the head with  intravenous contrast. LVO analysis was performed with [email or web address].AI software.   This CT exam was performed using one or more of the following dose  reduction techniques:  automated exposure control, adjustment of the mA  and/or kV according to patient size, and/or use of iterative  reconstruction  technique.    MIP reconstructed images were created and reviewed.     COMPARISON:    No relevant prior studies available.     FINDINGS:    RIGHT INTERNAL CAROTID ARTERY:  No acute findings.  Intracranial  segment is patent with no significant stenosis.  No aneurysm.    RIGHT ANTERIOR CEREBRAL ARTERY:  Unremarkable.  No occlusion or  significant stenosis.  No aneurysm.    RIGHT MIDDLE CEREBRAL ARTERY:  Unremarkable.  No occlusion or  significant stenosis.  No aneurysm.    RIGHT POSTERIOR CEREBRAL ARTERY:  Unremarkable.  No occlusion or  significant stenosis.  No aneurysm.    RIGHT VERTEBRAL ARTERY:  Unremarkable as visualized.       LEFT INTERNAL CAROTID ARTERY:  No acute findings.  Intracranial  segment is patent with no significant stenosis.  No aneurysm.    LEFT ANTERIOR CEREBRAL ARTERY:  Unremarkable.  No occlusion or  significant stenosis.  No aneurysm.    LEFT MIDDLE CEREBRAL ARTERY:  Unremarkable.  No occlusion or  significant stenosis.  No aneurysm.    LEFT POSTERIOR CEREBRAL ARTERY:  Unremarkable.  No occlusion or  significant stenosis.  No aneurysm.    LEFT VERTEBRAL ARTERY:  Unremarkable as visualized.       BASILAR ARTERY:  Unremarkable.  No occlusion or significant stenosis.   No aneurysm.    OTHER FINDINGS:  No hemodynamically significant stenosis or aneurysm  identified.       Impression:        No hemodynamically significant stenosis or aneurysm identified.     This report was finalized on 12/18/2024 4:02 PM by Dr. Milad Wong MD.       CT Angiogram Neck [[id number]] Collected: 12/18/24 1600     Updated: 12/18/24 1603    Narrative:      EXAM:    CT Angiography Neck With Intravenous Contrast     EXAM DATE:    12/18/2024 3:52 PM     CLINICAL HISTORY:    Stroke, follow up     TECHNIQUE:    Axial computed tomographic angiography images of the neck with  intravenous contrast.  This CT exam was performed using one or more of  the following dose reduction techniques:  automated exposure control,  adjustment  of the mA and/or kV according to patient size, and/or use of  iterative reconstruction technique.    MIP reconstructed images were created and reviewed.     COMPARISON:    None.     FINDINGS:      VASCULATURE:    RIGHT COMMON CAROTID ARTERY:  Unremarkable.  No occlusion or  significant stenosis.  No dissection.    RIGHT INTERNAL CAROTID ARTERY:  Unremarkable.  Extracranial segment is  patent with no occlusion or significant stenosis.  No dissection.    RIGHT EXTERNAL CAROTID ARTERY:  Unremarkable.  No occlusion.    RIGHT VERTEBRAL ARTERY:  Unremarkable.  No occlusion or significant  stenosis.  No dissection.       LEFT COMMON CAROTID ARTERY:  Unremarkable.  No occlusion or  significant stenosis.  No dissection.    LEFT INTERNAL CAROTID ARTERY:  Unremarkable.  Extracranial segment is  patent with no occlusion or significant stenosis.  No dissection.    LEFT EXTERNAL CAROTID ARTERY:  Unremarkable.  No occlusion.    LEFT VERTEBRAL ARTERY:  Unremarkable.  No occlusion or significant  stenosis.  No dissection.      NECK:    BONES/JOINTS:  Unremarkable.  No acute fracture.    SOFT TISSUES:  Unremarkable.    LUNG APICES:  Clear.    OTHER FINDINGS:  No hemodynamically significant stenosis detected.      CAROTID STENOSIS REFERENCE USING NASCET CRITERIA:    % ICA stenosis = (1 - narrowest ICA diameter/diameter of distal  cervical ICA) x 100.    Mild - <50% stenosis.    Moderate - 50-69% stenosis.    Severe - 70-94% stenosis.    Near occlusion - 95-99% stenosis.    Occluded - 100% stenosis.       Impression:        No hemodynamically significant stenosis detected.b     This report was finalized on 12/18/2024 4:01 PM by Dr. Milad Wong MD.       CT CEREBRAL PERFUSION WITH & WITHOUT CONTRAST [[id number]] Resulted: 12/18/24 1552     Updated: 12/18/24 1554    CT Head Without Contrast Stroke Protocol [[id number]] Collected: 12/18/24 1530     Updated: 12/18/24 1532    Narrative:      CT HEAD WO CONTRAST STROKE PROTOCOL-     CLINICAL  INDICATION: Neuro deficit, acute, stroke suspected        COMPARISON: 10/28/2016     TECHNIQUE: Axial images of the brain were obtained with out intravenous  contrast.  Reformatted images were created in the sagittal and coronal  planes.     DOSE:     Radiation dose reduction techniques were utilized per ALARA protocol.  Automated exposure control was initiated through either or [employer] or  D[employer]Right software packages by  protocol.        FINDINGS:    BRAIN:  Unremarkable.  No hemorrhage.  No significant white matter  disease.  No edema.       VENTRICLES:  Unremarkable.  No ventriculomegaly.       BONES/JOINTS:  Unremarkable.  No acute fracture.       SOFT TISSUES:  Unremarkable.       SINUSES:  no air fluid levels       MASTOID AIR CELLS:  Unremarkable as visualized.  No mastoid effusion.          Impression:         1. No evidence of an acute ischemic event  2. No parenchymal mass, hemorrhage, or midline shift  3. Results were relayed to the emergency department at 3:29 p.m.     This report was finalized on 12/18/2024 3:30 PM by Dr. Milad Wong MD.             Results for orders placed during the hospital encounter of 12/18/24    Adult Transthoracic Echo Complete W/ Cont if Necessary Per Protocol    Interpretation Summary    Left ventricular systolic function is moderately decreased. Left ventricular ejection fraction appears to be 36 - 40%.    Left ventricular wall thickness is consistent with concentric hypertrophy.    Left ventricular diastolic function was indeterminate.    Mildly reduced right ventricular systolic function noted.    Saline test results are negative for right to left atrial level shunt.    Estimated right ventricular systolic pressure from tricuspid regurgitation is normal (<35 mmHg).    Mild dilation of the aortic root is present.    Mild aortic valve regurgitation is present.    US Carotid Bilateral    Result Date: 12/19/2024  Impression:  No evidence of hemodynamically  significant plaques or stenosis within the carotid system at this time.   This report was finalized on 12/19/2024 1:00 PM by Dr. Milad Wong MD.      EEG    Result Date: 12/19/2024  Diffuse cerebral dysfunction mild degree, nonspecific.  This is most commonly seen due to toxic/metabolic cause No evidence for epilepsy is present This report is transcribed using the Dragon dictation system.      XR Chest 1 View    Result Date: 12/18/2024  No radiographic evidence of acute cardiac or pulmonary disease.    This report was finalized on 12/18/2024 4:03 PM by Dr. Milad Wong MD.      CT Angiogram Head w AI Analysis of LVO    Result Date: 12/18/2024    No hemodynamically significant stenosis or aneurysm identified.  This report was finalized on 12/18/2024 4:02 PM by Dr. Milad Wong MD.      CT Angiogram Neck    Result Date: 12/18/2024    No hemodynamically significant stenosis detected.b  This report was finalized on 12/18/2024 4:01 PM by Dr. Milad Wong MD.      CT Head Without Contrast Stroke Protocol    Result Date: 12/18/2024   1. No evidence of an acute ischemic event 2. No parenchymal mass, hemorrhage, or midline shift 3. Results were relayed to the emergency department at 3:29 p.m.  This report was finalized on 12/18/2024 3:30 PM by Dr. Milad Wong MD.        Lab Results   Component Value Date    HGBA1C 9.50 (H) 11/19/2024      Lab Results   Component Value Date    CHOL 145 12/19/2024    CHLPL 172 11/19/2024    TRIG 88 12/19/2024    HDL 39 (L) 12/19/2024    LDL 89 12/19/2024      Lab Results   Component Value Date    WBC 5.69 12/18/2024    HGB 11.9 (L) 12/18/2024    HCT 39.0 12/18/2024    MCV 99.7 (H) 12/18/2024    PLT 94 (L) 12/18/2024      Lab Results   Component Value Date    GLUCOSE 196 (H) 12/19/2024    BUN 17 12/19/2024    CREATININE 0.92 12/19/2024    EGFRIFNONA 73 09/16/2018    BCR 18.5 12/19/2024    K 3.6 12/19/2024    CO2 22.9 12/19/2024    CALCIUM 9.2 12/19/2024    PROTENTOTREF 6.5 11/19/2024     ALBUMIN 4.2 12/18/2024    LABIL2 1.8 11/19/2024    AST 11 12/18/2024    ALT 9 12/18/2024      Lab Results   Component Value Date    TSH 0.569 12/18/2024     Lab Results   Component Value Date    [id number] 354 12/19/2024     Lab Results   Component Value Date    FOLATE 10.70 12/19/2024             Assessment/Plan     Assessment/Plan:  84 y.o. right-handed white male with a Hx of prostate cancer for which she had received radiation therapy in the last 1 and 2009 and has been declared cancer free was found around 9 AM to be staring at his wife and not verbally communicating.  She then woke him up and he got up and he wanted to use the bathroom and at that point it was noticeable he just urinated on the floor without being able to reach the bathroom.  There was no tonic-clonic convulsions noted or any kind of tongue bite but he seemed to be staring off into space.     #Encephalopathy, resolved      -Continue home aspirin/statin  -No MRI due to presence of AICD  -Routine EEG showed mild nonspecific generalized slowing, no evidence of epilepsy  -Normal blood pressure goals  -Follow-up with general neurology as outpatient    The case was discussed with the patient, and will discuss with primary team.    Aldair Madsen, APRN  12/19/24  13:49 EST    This was an audio and video enabled telemedicine encounter.  Dr. Haney present for encounter via telemedicine.  Verbal consent taken.

## 2024-12-19 NOTE — THERAPY EVALUATION
Acute Care - Speech Language Pathology   Swallow Initial Evaluation  [facility]  CLINICAL DYSPHAGIA ASSESSMENT     Patient Name: Teddy De Leon  : 1940  MRN: [id number]  Today's Date: 2024             Admit Date: 2024    Visit Dx:     ICD-10-CM ICD-9-CM   1. Altered mental status, unspecified altered mental status type  R41.82 780.97   2. 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    Ischemic cardiomyopathy, with LV ejection fraction of about 25% on recent echo Doppler study in 2017, compensated.    Type 2 diabetes mellitus with hypoglycemia, with long-term current use of insulin    Mixed hyperlipidemia    Essential hypertension    Back pain    Arrhythmia    Prostate cancer, status post radiation therapy and is currently on long-term chemotherapy.    Iron deficiency anemia    Sternal fracture    LBBB (left bundle branch block)    Cardiac resynchronization therapy defibrillator (CRT-D) in place    Chronic systolic congestive heart failure    Encephalopathy acute     Past Medical History:   Diagnosis Date    Arthritis     sites varies     Coronary artery disease     stent inserted by Dr Palencia     Diabetes mellitus     insulin and po meds     GERD (gastroesophageal reflux disease)     Hyperlipidemia     Injury of back     Iron deficiency anemia 10/31/2016    Myocardial infarction 1998    Prostate cancer     radiation treatment -no surgery     Wears glasses      Past Surgical History:   Procedure Laterality Date    CARDIAC CATHETERIZATION      CARDIAC ELECTROPHYSIOLOGY PROCEDURE N/A 2018    Procedure: Implant ICD - bi ventricular;  Surgeon: Nas Quevedo DO;  Location: [facility] INVASIVE LOCATION;  Service: Cardiology    CATARACT EXTRACTION, BILATERAL       "2024 and May 2024    COLONOSCOPY      CORONARY STENT PLACEMENT      FRACTURE SURGERY Right     rt arm repair     SKIN BIOPSY      nose -small area     Teddy De Leon was seen at bedside this am on 3S to assess safety/efficacy of swallowing fnx, determine safest/least restrictive diet tolerance.     Per chart review, PMH is \"ischemic cardiomyopathy status post AICD placement in 2016, CAD with history of PCI who presented to the emergency room this afternoon due to altered mental status.  Wife provides most of the history.  Reports patient went to primary care provider yesterday and was told all of his labs were normal and discussed some additional treatment for management of his diabetes.  This morning when patient woke up he appeared to be confused and was not able to get out of bed and had urinary incontinence which is very unusual for him.  Throughout the day he had difficulty ambulating and following directions and appeared very confused compared to his baseline therefore she brought him to the emergency room.  In the emergency room there were some concern for possible left-sided weakness as well as orientation deficits.  Wife denies any recent illness, fevers, chills, nausea, vomiting or bleeding.\"    Code stroke was called. He was referred per this status.     Social History     Socioeconomic History    Marital status:    Tobacco Use    Smoking status: Former     Current packs/day: 0.00     Average packs/day: 2.0 packs/day for 19.0 years (38.0 ttl pk-yrs)     Types: Cigarettes     Start date:      Quit date:      Years since quittin.9    Smokeless tobacco: Former     Types: Chew     Quit date:    Vaping Use    Vaping status: Never Used   Substance and Sexual Activity    Alcohol use: No    Drug use: No    Sexual activity: Defer      Imaging:  Narrative & Impression   XR CHEST 1 VW-     CLINICAL INDICATION: Acute Stroke Protocol (onset < 12 hrs)        COMPARISON: 2020   "   TECHNIQUE: Single frontal view of the chest.     FINDINGS:     LUNGS: Lungs are adequately aerated.      HEART AND MEDIASTINUM: Heart and mediastinal contours are unremarkable        SKELETON: Bony and soft tissue structures are unremarkable.           IMPRESSION:  No radiographic evidence of acute cardiac or pulmonary disease.           This report was finalized on 12/18/2024 4:03 PM by Dr. Milad Wong MD.     Labs:  Sodium  131  12/19 0010  Potassium  3.6  12/19 0010  Chloride  95  12/19 0010  CO2  22.9  12/19 0010  BUN  17  12/19 0010  Creatinine  0.92  12/19 0010  Glucose  356  12/19 1112  Hemoglobin  11.9  12/18 1536  Hematocrit  39.0  12/18 1536  WBC  5.69  12/18 1536  Platelets  94  12/18 1536  PTT  24.2  12/18 1536  Protime  12.7  12/18 1536  INR  0.95  12/18 1536  ABO Type  A  12/19 0011  RH type  Positive  12/19 0011     Diet Orders (active) (From admission, onward)       Start     Ordered    12/18/24 1945  Diet: Regular/House, Diabetic; Consistent Carbohydrate; Feeding Assistance - Nursing; Texture: Soft to Chew (NDD 3); Soft to Chew: Chopped Meat; Fluid Consistency: Thin (IDDSI 0)  Diet Effective Now         12/18/24 1944                  He was observed on ra w/o complications.     Patient was positioned upright and centered in bed to accept multiple po presentations of ice chips, solid cracker, puree, and thin liquids via spoon, cup, and straw. Patient was able to self provide po trials.     Facial/oral structures were symmetrical upon observation. Lingual protrusion revealed no deviation. Oral mucosa were moist, pink, and clean. Secretions were clear, thin, and well controlled. OROM/JORGE was wfl to imitate oral postures. Gag was not assessed. Volitional cough was intact w/ adequate intensity, clear in quality, non-productive. Voice was adequate in intensity, clear in quality w/ intelligible speech.    Upon po presentations, adequate bolus anticipation and acceptance w/ good labial seal for bolus  clearance via spoon bowl, cup rim stability and suction via straw. Bolus formation, manipulation and control were wfl w/ rotary mastication pattern. A-p transit was timely w/o significant oral residue appreciated. No overt s/s aspiration before the swallow.      Pharyngeal swallow was timely w/ adequate hyolaryngeal elevation per palpation. No overt s/s aspiration evidenced across this assessment. No silent aspiration suspected. Patient denied odynophagia.    Impression: Per this assessment, Mr. De Leon presented w/ wfl oropharyngeal swallow w/o s/s aspiration. No s/s indicative of silent aspiration. No odynophagia reported.      Based on this assessment, least restrictive po diet felt to be regular consistencies, thin liquids. He did request to continue current modified diet of soft to chew textures, chopped meats, thin liquids per personal preference.     SLP Recommendation and Plan  1. Soft to chew textures, chopped meats, thin liquids. May upgrade to regular consistencies as preferred.    2. Medications whole in puree/thins.   3. Upright and centered for all po intake.  4. KELLEY precautions.  5. Oral care protocol.  No further formal SLP f/u for swallowing fnx warranted/recommended at this time.    D/w patient results and recommendations w/ verbal agreement.    D/w RN results and recommendations w/ verbal agreement.    Thank you for allowing me to participate in the care of your patient-  Chula Palencia M.A., CCC-SLP    EDUCATION  The patient has been educated in the following areas:   Dysphagia (Swallowing Impairment).     Time Calculation:     Therapy Charges for Today       Code Description Service Date Service Provider Modifiers Qty    41993460062 HC ST EVAL SPEECH AND PROD W LANG  4 12/19/2024 Chula Palencia MA,CCC-SLP GN 1    33188570428 HC ST EVAL ORAL PHARYNG SWALLOW 4 12/19/2024 Chula Palencia MA,CCC-SLP GN 1            Cuhla Palencia MA,CCC-SLP  12/19/2024   and Acute Care -  Speech Language Pathology Initial Evaluation   [facility]  SPEECH LANGUAGE COGNITIVE ASSESSMENT     Patient Name: Teddy De Leon  : 1940  MRN: [id number]  Today's Date: 2024             Admit Date: 2024     Visit Dx:    ICD-10-CM ICD-9-CM   1. Altered mental status, unspecified altered mental status type  R41.82 780.97   2. 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    Ischemic cardiomyopathy, with LV ejection fraction of about 25% on recent echo Doppler study in 2017, compensated.    Type 2 diabetes mellitus with hypoglycemia, with long-term current use of insulin    Mixed hyperlipidemia    Essential hypertension    Back pain    Arrhythmia    Prostate cancer, status post radiation therapy and is currently on long-term chemotherapy.    Iron deficiency anemia    Sternal fracture    LBBB (left bundle branch block)    Cardiac resynchronization therapy defibrillator (CRT-D) in place    Chronic systolic congestive heart failure    Encephalopathy acute     Past Medical History:   Diagnosis Date    Arthritis     sites varies     Coronary artery disease     stent inserted by Dr Palencia     Diabetes mellitus     insulin and po meds     GERD (gastroesophageal reflux disease)     Hyperlipidemia     Injury of back     Iron deficiency anemia 10/31/2016    Myocardial infarction 1998    Prostate cancer     radiation treatment -no surgery     Wears glasses      Past Surgical History:   Procedure Laterality Date    CARDIAC CATHETERIZATION      CARDIAC ELECTROPHYSIOLOGY PROCEDURE N/A 2018    Procedure: Implant ICD - bi ventricular;  Surgeon: Nas Quevedo DO;  Location: [facility] INVASIVE LOCATION;  Service: Cardiology    CATARACT EXTRACTION, BILATERAL      2024 and  "May 2024    COLONOSCOPY      CORONARY STENT PLACEMENT      FRACTURE SURGERY Right     rt arm repair     SKIN BIOPSY      nose -small area     Teddy De Leon was seen at bedside this am on 3S to participate in s/l and cognitive assessment for safety/function in adls. Patient was positioned upright and centered in bed to cooperatively participate. All results and observations of this evaluation are felt to be representative of patient's current functional status.    Per chart review, PMH is \"ischemic cardiomyopathy status post AICD placement in 2016, CAD with history of PCI who presented to the emergency room this afternoon due to altered mental status.  Wife provides most of the history.  Reports patient went to primary care provider yesterday and was told all of his labs were normal and discussed some additional treatment for management of his diabetes.  This morning when patient woke up he appeared to be confused and was not able to get out of bed and had urinary incontinence which is very unusual for him.  Throughout the day he had difficulty ambulating and following directions and appeared very confused compared to his baseline therefore she brought him to the emergency room.  In the emergency room there were some concern for possible left-sided weakness as well as orientation deficits.  Wife denies any recent illness, fevers, chills, nausea, vomiting or bleeding.\"    Code stroke was called. He was referred per this status. Per chart review, he is significantly improved in ams today.     Social History     Socioeconomic History    Marital status:    Tobacco Use    Smoking status: Former     Current packs/day: 0.00     Average packs/day: 2.0 packs/day for 19.0 years (38.0 ttl pk-yrs)     Types: Cigarettes     Start date:      Quit date:      Years since quittin.9    Smokeless tobacco: Former     Types: Chew     Quit date:    Vaping Use    Vaping status: Never Used   Substance and " "Sexual Activity    Alcohol use: No    Drug use: No    Sexual activity: Defer      Diet Orders (active) (From admission, onward)       Start     Ordered    12/18/24 1945  Diet: Regular/House, Diabetic; Consistent Carbohydrate; Feeding Assistance - Nursing; Texture: Soft to Chew (NDD 3); Soft to Chew: Chopped Meat; Fluid Consistency: Thin (IDDSI 0)  Diet Effective Now         12/18/24 1944                  He was observed on ra w/o complications.    Receptive language skills were intact. Patient was able to id common objects and personal body parts, follow simple and multi-step directives, understand simple-mod \"wh\" questions and participate in conversational exchange w/o difficulties.    Expressive language skills were intact. Patient was able to complete automatic speech tasks, confrontation naming tasks, complete simple oe sentences, respond to simple-mod oe \"wh\" questions, repeat at word/sentence and multiple digit levels, as well as participate in basic conversational exchange. No aphasia, anomia or verbal apraxia evidenced.    Patient was independently oriented to person, place and current president, required min cues for current year. Immediate and STM appeared mildly impaired, he required min cues and extra time to recall recent adls/information. LTM was intact. He recalled personal information w/o delay. Thought organization, processing, and problem solving appeared intact. He was able to demonstrate appropriate comparing/contrasting, understanding of idiomatic language, convergent and divergent thinking skills.    Facial/oral structures were symmetrical upon observation. Oral mucosa were moist, pink and clean. Secretions were clear, thin, and controlled. OROM/JORGE was WFL w/o lingual deviation upon protrusion. Speech intensity, clarity, and intelligibility were WFL w/o dysarthria or oral apraxia noted.    Reading skills were intact, premorbid baseline status. Patient was able to id isolated letters, simple, " and moderate words, as well as sentences.     Pragmatic skills were WFL w/ appropriate eye gaze patterns and visual tracking. Language was appropriate in context w/ topic initiation and maintenance independently. No neglect evidenced. Humor response was intact w/ appropriate affect.    Impression: Per this assessment, Mr. De Leon presented with speech, language, and cognitive skills representative of his premorbid baseline function.     SLP Recommendation and Plan  No further formal SLP f/u recommended for s/l and cognitive skills.     D/w patient results and recommendations w/ verbal understanding and agreement.    D/w RN results and recommendations w/ verbal understanding and agreement.     Thank you for allowing me to participate in the care of your patient-  Chula Palencia M.A., CCC-SLP    EDUCATION  The patient has been educated in the following areas:   Cognitive Impairment Communication Impairment.    Time Calculation:     Therapy Charges for Today       Code Description Service Date Service Provider Modifiers Qty    87630182033 HC ST EVAL SPEECH AND PROD W LANG  4 12/19/2024 Chula Palencia MA,CCC-SLP GN 1    [id number] HC ST EVAL ORAL PHARYNG SWALLOW 4 12/19/2024 Chula Palencia MA,CCC-SLP GN 1            Chula Palencia MA,CCC-SLP  12/19/2024

## 2024-12-20 ENCOUNTER — TELEPHONE (OUTPATIENT)
Dept: NEUROLOGY | Facility: CLINIC | Age: 84
End: 2024-12-20
Payer: MEDICARE

## 2024-12-20 ENCOUNTER — TRANSITIONAL CARE MANAGEMENT TELEPHONE ENCOUNTER (OUTPATIENT)
Dept: CALL CENTER | Facility: HOSPITAL | Age: 84
End: 2024-12-20
Payer: MEDICARE

## 2024-12-20 LAB
QT INTERVAL: 416 MS
QTC INTERVAL: 474 MS

## 2024-12-20 NOTE — OUTREACH NOTE
Prep Survey      Flowsheet Row Responses   [facility] patient discharged from? [facility]   Is LACE score < 7 ? No   Eligibility [facility]   Date of Admission 12/18/24   Date of Discharge 12/19/24   Discharge Disposition Home or Self Care   Discharge diagnosis Encephalopathy acute   Does the patient have one of the following disease processes/diagnoses(primary or secondary)? Other   Does the patient have Home health ordered? No   Is there a DME ordered? No   Prep survey completed? Yes            Britt LEE - Registered Nurse

## 2024-12-20 NOTE — TELEPHONE ENCOUNTER
Caller: Teddy De Leon    Relationship to patient: Self    Best call back number: [phone]    New or established patient?  [x] New  [] Established    Date of discharge: 12/19/2024    Facility discharged from: [facility]    Diagnosis/Symptoms: ACUTE NEUROLOGIC ENCEPHALOPATHY, POSSIBLE SEIZURE, POSSIBLE DELIRIUM, POSSIBLE NEW ONSET DEMENTIA    Length of stay (If applicable): 26 HOURS    Specialty Only: Did you see a Denominational health provider?    [x] Yes  [] No  If so, who? DR MARTINEZ & SHAGUFTA STRANGE    Additional Details: DISCHARGE STATES FOLLOW UP IN 1 MONTH, PLEASE ADVISE ON SCHEDULING.

## 2024-12-20 NOTE — DISCHARGE INSTR - APPOINTMENTS
Called  [facility]   at  [phone]   spoke  with  lalita  stated  that  they  would  call  pt  with  apointment

## 2024-12-20 NOTE — OUTREACH NOTE
Call Center TCM Note      Flowsheet Row Responses   [facility] facility patient discharged from? [place]   Does the patient have one of the following disease processes/diagnoses(primary or secondary)? Other   TCM attempt successful? No  [vr for Miri, wife and Gretchen, dtr]   Unsuccessful attempts Attempt 1  [attempted home, cell and dtr]   Call Status Left message            Heidy Olvera RN    12/20/2024, 15:07 EST

## 2024-12-20 NOTE — OUTREACH NOTE
Call Center TCM Note      Flowsheet Row Responses   [facility] facility patient discharged from? [facility]   Does the patient have one of the following disease processes/diagnoses(primary or secondary)? Other   TCM attempt successful? No   Unsuccessful attempts Attempt 2            Heidy Olvera RN    12/20/2024, 15:43 EST

## 2024-12-21 ENCOUNTER — TRANSITIONAL CARE MANAGEMENT TELEPHONE ENCOUNTER (OUTPATIENT)
Dept: CALL CENTER | Facility: HOSPITAL | Age: 84
End: 2024-12-21
Payer: MEDICARE

## 2024-12-21 NOTE — OUTREACH NOTE
Call Center TCM Note      Flowsheet Row Responses   [facility] facility patient discharged from? [facility]   Does the patient have one of the following disease processes/diagnoses(primary or secondary)? Other   TCM attempt successful? No   Unsuccessful attempts Attempt 3            Lindsey Cole RN    12/21/2024, 11:01 EST

## 2024-12-23 LAB
BACTERIA SPEC AEROBE CULT: NORMAL
BACTERIA SPEC AEROBE CULT: NORMAL

## 2024-12-23 PROCEDURE — 0042T CT CEREBRAL PERFUSION W WO CONTRAST: CPT | Performed by: RADIOLOGY

## 2025-01-17 PROCEDURE — 93295 DEV INTERROG REMOTE 1/2/MLT: CPT | Performed by: STUDENT IN AN ORGANIZED HEALTH CARE EDUCATION/TRAINING PROGRAM

## 2025-01-17 PROCEDURE — 93296 REM INTERROG EVL PM/IDS: CPT | Performed by: STUDENT IN AN ORGANIZED HEALTH CARE EDUCATION/TRAINING PROGRAM

## 2025-02-11 ENCOUNTER — TELEPHONE (OUTPATIENT)
Dept: CARDIOLOGY | Facility: CLINIC | Age: 85
End: 2025-02-11
Payer: MEDICARE

## 2025-02-11 NOTE — TELEPHONE ENCOUNTER
Called to alert Mr De Leon that his latitude monitor is not connecting, He found it unplugged. He plugged it back up beside his bed and we will check it again tomorrow.

## 2025-02-18 ENCOUNTER — OFFICE VISIT (OUTPATIENT)
Dept: FAMILY MEDICINE CLINIC | Facility: CLINIC | Age: 85
End: 2025-02-18
Payer: MEDICARE

## 2025-02-18 VITALS
SYSTOLIC BLOOD PRESSURE: 132 MMHG | WEIGHT: 181.2 LBS | OXYGEN SATURATION: 96 % | TEMPERATURE: 96.9 F | BODY MASS INDEX: 26.84 KG/M2 | DIASTOLIC BLOOD PRESSURE: 80 MMHG | HEART RATE: 63 BPM | HEIGHT: 69 IN

## 2025-02-18 DIAGNOSIS — E11.9 TYPE 2 DIABETES MELLITUS WITHOUT COMPLICATION, WITH LONG-TERM CURRENT USE OF INSULIN: ICD-10-CM

## 2025-02-18 DIAGNOSIS — M72.0 DUPUYTREN'S CONTRACTURE OF LEFT HAND: Primary | ICD-10-CM

## 2025-02-18 DIAGNOSIS — C61 PROSTATE CANCER: ICD-10-CM

## 2025-02-18 DIAGNOSIS — Z79.4 TYPE 2 DIABETES MELLITUS WITHOUT COMPLICATION, WITH LONG-TERM CURRENT USE OF INSULIN: ICD-10-CM

## 2025-02-18 DIAGNOSIS — K21.9 GASTROESOPHAGEAL REFLUX DISEASE, UNSPECIFIED WHETHER ESOPHAGITIS PRESENT: ICD-10-CM

## 2025-02-18 LAB
EXPIRATION DATE: ABNORMAL
HBA1C MFR BLD: 9.5 % (ref 4.5–5.7)
Lab: ABNORMAL

## 2025-02-18 PROCEDURE — 3079F DIAST BP 80-89 MM HG: CPT | Performed by: STUDENT IN AN ORGANIZED HEALTH CARE EDUCATION/TRAINING PROGRAM

## 2025-02-18 PROCEDURE — 99214 OFFICE O/P EST MOD 30 MIN: CPT | Performed by: STUDENT IN AN ORGANIZED HEALTH CARE EDUCATION/TRAINING PROGRAM

## 2025-02-18 PROCEDURE — 3075F SYST BP GE 130 - 139MM HG: CPT | Performed by: STUDENT IN AN ORGANIZED HEALTH CARE EDUCATION/TRAINING PROGRAM

## 2025-02-18 PROCEDURE — 83036 HEMOGLOBIN GLYCOSYLATED A1C: CPT | Performed by: STUDENT IN AN ORGANIZED HEALTH CARE EDUCATION/TRAINING PROGRAM

## 2025-02-18 PROCEDURE — 1126F AMNT PAIN NOTED NONE PRSNT: CPT | Performed by: STUDENT IN AN ORGANIZED HEALTH CARE EDUCATION/TRAINING PROGRAM

## 2025-02-18 PROCEDURE — 3046F HEMOGLOBIN A1C LEVEL >9.0%: CPT | Performed by: STUDENT IN AN ORGANIZED HEALTH CARE EDUCATION/TRAINING PROGRAM

## 2025-02-18 RX ORDER — OMEPRAZOLE 20 MG/1
20 CAPSULE, DELAYED RELEASE ORAL EVERY MORNING
Qty: 90 CAPSULE | Refills: 1 | Status: SHIPPED | OUTPATIENT
Start: 2025-02-18

## 2025-02-18 RX ORDER — BICALUTAMIDE 50 MG/1
50 TABLET, FILM COATED ORAL EVERY MORNING
Qty: 90 TABLET | Refills: 1 | Status: SHIPPED | OUTPATIENT
Start: 2025-02-18

## 2025-02-24 NOTE — PROGRESS NOTES
Chief Complaint  Diabetes and Diarrhea    HPI:   Diabetes    Diarrhea        Teddy De Leon is a 84 y.o. male who presents today to [facility] for Diabetes and Diarrhea.  History of Present Illness  The patient is an 84-year-old male who presents for evaluation of Dupuytren's contracture and diabetes.    He has been experiencing a persistent contracture in his finger, which has not improved despite previous interventions. He was referred to a specialist in [place], where a surgical procedure was performed to straighten the affected finger. The other finger was treated by Dr. Shin, resulting in a blister post-injection. The subsequent manipulation of the finger led to significant bleeding and pain. He is now seeking a second opinion regarding his condition.    He has received one injection of Ozempic without any adverse reactions. However, he has not been consistent with his medication regimen, having forgotten to take his other prescribed medications.    He is currently on a twice-daily dose of metoprolol but has only been provided with a 30-day supply. He attempted to refill his prescription but was informed that it would not be ready until Tuesday, 25th, which is beyond his current supply. He is requesting an increase in the quantity of his prescription to ensure he does not run out of medication.         Previous History:   Past Medical History:   Diagnosis Date    Arthritis     sites varies     Coronary artery disease 1998    stent inserted by Dr Palencia     Diabetes mellitus     insulin and po meds     GERD (gastroesophageal reflux disease)     Hyperlipidemia     Injury of back     Iron deficiency anemia 10/31/2016    Myocardial infarction 1998    Prostate cancer     radiation treatment -no surgery     Wears glasses       Past Surgical History:   Procedure Laterality Date    CARDIAC CATHETERIZATION      CARDIAC ELECTROPHYSIOLOGY PROCEDURE N/A 09/20/2018    Procedure:  Implant ICD - bi ventricular;  Surgeon: Nas Quevedo DO;  Location:  [facility];  Service: Cardiology    CATARACT EXTRACTION, BILATERAL      2024 and May 2024    COLONOSCOPY      CORONARY STENT PLACEMENT      FRACTURE SURGERY Right     rt arm repair     SKIN BIOPSY      nose -small area      Social History     Socioeconomic History    Marital status:    Tobacco Use    Smoking status: Former     Current packs/day: 0.00     Average packs/day: 2.0 packs/day for 19.0 years (38.0 ttl pk-yrs)     Types: Cigarettes     Start date:      Quit date:      Years since quittin.1    Smokeless tobacco: Former     Types: Chew     Quit date:    Vaping Use    Vaping status: Never Used   Substance and Sexual Activity    Alcohol use: No    Drug use: No    Sexual activity: Defer      Health Maintenance Due   Topic Date Due    TDAP/TD VACCINES (1 - Tdap) Never done    COLORECTAL CANCER SCREENING  Never done    RSV Vaccine - Adults (1 - 1-dose 75+ series) Never done    DIABETIC EYE EXAM  2022    URINE MICROALBUMIN-CREATININE RATIO (uACR)  2024    COVID-19 Vaccine ( season) 2024        Current Medications:  Current Outpatient Medications   Medication Sig Dispense Refill    aspirin 81 MG EC tablet Take 1 tablet by mouth Daily. 30 tablet 5    atorvastatin (LIPITOR) 40 MG tablet Take 1 tablet by mouth Every Night.      bicalutamide (CASODEX) 50 MG tablet Take 1 tablet by mouth Every Morning. 90 tablet 1    carvedilol (COREG) 6.25 MG tablet Take 1 tablet by mouth 2 (Two) Times a Day With Meals.      glimepiride (AMARYL) 2 MG tablet Take 1 tablet by mouth 2 (Two) Times a Day. 60 tablet 3    Insulin Glargine (Lantus SoloStar) 100 UNIT/ML injection pen Inject 40 Units under the skin into the appropriate area as directed Daily.      metFORMIN (GLUCOPHAGE) 500 MG tablet Take 2 tablets by mouth 2 (Two) Times a Day.      nabumetone (RELAFEN) 500 MG tablet Take 1 tablet  "by mouth 2 (Two) Times a Day.      omeprazole (priLOSEC) 20 MG capsule Take 1 capsule by mouth Every Morning. 90 capsule 1    tamsulosin (FLOMAX) 0.4 MG capsule 24 hr capsule Take 1 capsule by mouth Every Night.       No current facility-administered medications for this visit.       Allergies:   No Known Allergies    Vitals:   /80 (BP Location: Right arm, Patient Position: Sitting, Cuff Size: Adult)   Pulse 63   Temp 96.9 °F (36.1 °C) (Temporal)   Ht 175.3 cm (69\")   Wt 82.2 kg (181 lb 3.2 oz)   SpO2 96%   BMI 26.76 kg/m²     Estimated body mass index is 26.76 kg/m² as calculated from the following:    Height as of this encounter: 175.3 cm (69\").    Weight as of this encounter: 82.2 kg (181 lb 3.2 oz).    Teddy De Leon  reports that he quit smoking about 46 years ago. His smoking use included cigarettes. He started smoking about 65 years ago. He has a 38 pack-year smoking history. He quit smokeless tobacco use about 45 years ago.  His smokeless tobacco use included chew.          Physical Exam:   Physical Exam  Constitutional:       Appearance: Normal appearance.   HENT:      Mouth/Throat:      Mouth: Mucous membranes are moist.   Cardiovascular:      Rate and Rhythm: Normal rate and regular rhythm.   Pulmonary:      Effort: Pulmonary effort is normal.      Breath sounds: Normal breath sounds. No wheezing or rhonchi.   Musculoskeletal:         General: Normal range of motion.   Skin:     General: Skin is warm and dry.   Neurological:      Mental Status: He is alert.   Psychiatric:         Mood and Affect: Mood normal.          Lab Results:   Office Visit on 02/18/2025   Component Date Value Ref Range Status    Hemoglobin A1C 02/18/2025 9.5 (A)  4.5 - 5.7 % Final    Lot Number 02/18/2025 [id number]   Final    Expiration Date 02/18/2025 2026-10-18   Final   Admission on 12/18/2024, Discharged on 12/19/2024   Component Date Value Ref Range Status    Glucose 12/18/2024 251 (H)  65 - 99 mg/dL Final    BUN " 12/18/2024 17  8 - 23 mg/dL Final    Creatinine 12/18/2024 0.97  0.76 - 1.27 mg/dL Final    Sodium 12/18/2024 133 (L)  136 - 145 mmol/L Final    Potassium 12/18/2024 4.3  3.5 - 5.2 mmol/L Final    Chloride 12/18/2024 96 (L)  98 - 107 mmol/L Final    CO2 12/18/2024 25.5  22.0 - 29.0 mmol/L Final    Calcium 12/18/2024 9.3  8.6 - 10.5 mg/dL Final    Total Protein 12/18/2024 6.6  6.0 - 8.5 g/dL Final    Albumin 12/18/2024 4.2  3.5 - 5.2 g/dL Final    ALT (SGPT) 12/18/2024 9  1 - 41 U/L Final    AST (SGOT) 12/18/2024 11  1 - 40 U/L Final    Alkaline Phosphatase 12/18/2024 65  39 - 117 U/L Final    Total Bilirubin 12/18/2024 0.7  0.0 - 1.2 mg/dL Final    Globulin 12/18/2024 2.4  gm/dL Final    A/G Ratio 12/18/2024 1.8  g/dL Final    BUN/Creatinine Ratio 12/18/2024 17.5  7.0 - 25.0 Final    Anion Gap 12/18/2024 11.5  5.0 - 15.0 mmol/L Final    eGFR 12/18/2024 77.9  >60.0 mL/min/1.73 Final    Protime 12/18/2024 12.7  12.1 - 14.7 Seconds Final    INR 12/18/2024 0.95  0.90 - 1.10 Final    Color, UA 12/18/2024 Yellow  Yellow, Straw Final    Appearance, UA 12/18/2024 Clear  Clear Final    pH, UA 12/18/2024 5.5  5.0 - 8.0 Final    Specific Gravity, UA 12/18/2024 >1.030 (H)  1.005 - 1.030 Final    Glucose, UA 12/18/2024 500 mg/dL (2+) (A)  Negative Final    Ketones, UA 12/18/2024 Trace (A)  Negative Final    Bilirubin, UA 12/18/2024 Negative  Negative Final    Blood, UA 12/18/2024 Negative  Negative Final    Protein, UA 12/18/2024 Trace (A)  Negative Final    Leuk Esterase, UA 12/18/2024 Negative  Negative Final    Nitrite, UA 12/18/2024 Negative  Negative Final    Urobilinogen, UA 12/18/2024 1.0 E.U./dL  0.2 - 1.0 E.U./dL Final    C-Reactive Protein 12/18/2024 3.31 (H)  0.00 - 0.50 mg/dL Final    Procalcitonin 12/18/2024 0.07  0.00 - 0.25 ng/mL Final    THC, Screen, Urine 12/18/2024 Negative  Negative Final    Phencyclidine (PCP), Urine 12/18/2024 Negative  Negative Final    Cocaine Screen, Urine 12/18/2024 Negative  Negative  Final    Methamphetamine, Ur 12/18/2024 Negative  Negative Final    Opiate Screen 12/18/2024 Negative  Negative Final    Amphetamine Screen, Urine 12/18/2024 Negative  Negative Final    Benzodiazepine Screen, Urine 12/18/2024 Negative  Negative Final    Tricyclic Antidepressants Screen 12/18/2024 Negative  Negative Final    Methadone Screen, Urine 12/18/2024 Negative  Negative Final    Barbiturates Screen, Urine 12/18/2024 Negative  Negative Final    Oxycodone Screen, Urine 12/18/2024 Negative  Negative Final    Buprenorphine, Screen, Urine 12/18/2024 Negative  Negative Final    TSH 12/18/2024 0.569  0.270 - 4.200 uIU/mL Final    Blood Culture 12/18/2024 No growth at 5 days   Final    Blood Culture 12/18/2024 No growth at 5 days   Final    Extra Tube 12/18/2024 Hold for add-ons.   Final    Auto resulted.    Extra Tube 12/18/2024 hold for add-on   Final    Auto resulted    Extra Tube 12/18/2024 Hold for add-ons.   Final    Auto resulted.    Extra Tube 12/18/2024 Hold for add-ons.   Final    Auto resulted    WBC 12/18/2024 5.69  3.40 - 10.80 10*3/mm3 Final    RBC 12/18/2024 3.91 (L)  4.14 - 5.80 10*6/mm3 Final    Hemoglobin 12/18/2024 11.9 (L)  13.0 - 17.7 g/dL Final    Hematocrit 12/18/2024 39.0  37.5 - 51.0 % Final    MCV 12/18/2024 99.7 (H)  79.0 - 97.0 fL Final    MCH 12/18/2024 30.4  26.6 - 33.0 pg Final    MCHC 12/18/2024 30.5 (L)  31.5 - 35.7 g/dL Final    RDW 12/18/2024 13.2  12.3 - 15.4 % Final    RDW-SD 12/18/2024 47.9  37.0 - 54.0 fl Final    MPV 12/18/2024 9.6  6.0 - 12.0 fL Final    Platelets 12/18/2024 94 (L)  140 - 450 10*3/mm3 Final    Neutrophil % 12/18/2024 77.4 (H)  42.7 - 76.0 % Final    Lymphocyte % 12/18/2024 10.9 (L)  19.6 - 45.3 % Final    Monocyte % 12/18/2024 10.2  5.0 - 12.0 % Final    Eosinophil % 12/18/2024 0.7  0.3 - 6.2 % Final    Basophil % 12/18/2024 0.4  0.0 - 1.5 % Final    Immature Grans % 12/18/2024 0.4  0.0 - 0.5 % Final    Neutrophils, Absolute 12/18/2024 4.41  1.70 - 7.00  10*3/mm3 Final    Lymphocytes, Absolute 12/18/2024 0.62 (L)  0.70 - 3.10 10*3/mm3 Final    Monocytes, Absolute 12/18/2024 0.58  0.10 - 0.90 10*3/mm3 Final    Eosinophils, Absolute 12/18/2024 0.04  0.00 - 0.40 10*3/mm3 Final    Basophils, Absolute 12/18/2024 0.02  0.00 - 0.20 10*3/mm3 Final    Immature Grans, Absolute 12/18/2024 0.02  0.00 - 0.05 10*3/mm3 Final    nRBC 12/18/2024 0.0  0.0 - 0.2 /100 WBC Final    QT Interval 12/18/2024 416  ms Final    QTC Interval 12/18/2024 474  ms Final    PTT 12/18/2024 24.2 (L)  26.5 - 34.5 seconds Final    ABO Type 12/18/2024 A   Final    RH type 12/18/2024 Positive   Final    Antibody Screen 12/18/2024 Negative   Final    T&S Expiration Date 12/18/2024 12/21/2024 11:59:59 PM   Final    Site 12/18/2024 Left Radial   Final    Flaco's Test 12/18/2024 Positive   Final    pH, Arterial 12/18/2024 7.459 (H)  7.350 - 7.450 pH units Final    83 Value above reference range    pCO2, Arterial 12/18/2024 37.2  35.0 - 45.0 mm Hg Final    pO2, Arterial 12/18/2024 65.6 (L)  83.0 - 108.0 mm Hg Final    84 Value below reference range    HCO3, Arterial 12/18/2024 26.4 (H)  20.0 - 26.0 mmol/L Final    83 Value above reference range    Base Excess, Arterial 12/18/2024 2.6 (H)  0.0 - 2.0 mmol/L Final    O2 Saturation, Arterial 12/18/2024 93.7 (L)  94.0 - 99.0 % Final    84 Value below reference range    Hemoglobin, Blood Gas 12/18/2024 12.4 (L)  14 - 18 g/dL Final    84 Value below reference range    Hematocrit, Blood Gas 12/18/2024 38.1  38.0 - 51.0 % Final    Oxyhemoglobin 12/18/2024 92.2 (L)  94 - 99 % Final    84 Value below reference range    Methemoglobin 12/18/2024 0.20  0.00 - 3.00 % Final    Carboxyhemoglobin 12/18/2024 1.4  0 - 5 % Final    A-a DO2 12/18/2024 35.1  0.0 - 300.0 mmHg Final    CO2 Content 12/18/2024 27.5  22 - 33 mmol/L Final    Barometric Pressure for Blood Gas 12/18/2024 727  mmHg Final    Modality 12/18/2024 Room Air   Final    FIO2 12/18/2024 21  % Final    Ventilator  Mode 12/18/2024 NA   Final    Collected by 12/18/2024 [id number]   Final    Meter: [id number]     :  [id number]    Creatinine 12/18/2024 1.00  0.60 - 1.30 mg/dL Final    Serial Number: [id number]:  [id number]    Fentanyl, Urine 12/18/2024 Negative  Negative Final    LVIDd 12/19/2024 4.5  cm Final    LVIDs 12/19/2024 3.3  cm Final    IVSd 12/19/2024 1.19  cm Final    LVPWd 12/19/2024 1.14  cm Final    FS 12/19/2024 26.2  % Final    IVS/LVPW 12/19/2024 1.04  cm Final    ESV(cubed) 12/19/2024 35.9  ml Final    LV Sys Vol (BSA corrected) 12/19/2024 24.8  cm2 Final    EDV(cubed) 12/19/2024 89.3  ml Final    LV [place] Vol (BSA corrected) 12/19/2024 39.9  cm2 Final    LV mass(C)d 12/19/2024 187.9  grams Final    LVOT area 12/19/2024 4.2  cm2 Final    LVOT diam 12/19/2024 2.30  cm Final    EDV(MOD-sp4) 12/19/2024 78.4  ml Final    ESV(MOD-sp4) 12/19/2024 48.8  ml Final    SV(MOD-sp4) 12/19/2024 29.6  ml Final    SVi(MOD-SP4) 12/19/2024 15.1  ml/m2 Final    SVi (LVOT) 12/19/2024 34.4  ml/m2 Final    EF(MOD-sp4) 12/19/2024 37.8  % Final    MV E max maeve 12/19/2024 40.3  cm/sec Final    MV A max maeve 12/19/2024 70.6  cm/sec Final    MV dec time 12/19/2024 0.30  sec Final    MV E/A 12/19/2024 0.57   Final    LA ESV Index (BP) 12/19/2024 25.6  ml/m2 Final    TR max maeve 12/19/2024 213.0  cm/sec Final    SV(LVOT) 12/19/2024 67.7  ml Final    TAPSE (>1.6) 12/19/2024 1.43  cm Final    LA dimension (2D)  12/19/2024 3.5  cm Final    LV V1 max 12/19/2024 84.8  cm/sec Final    LV V1 max PG 12/19/2024 2.9  mmHg Final    LV V1 mean PG 12/19/2024 1.00  mmHg Final    LV V1 VTI 12/19/2024 16.3  cm Final    Ao pk maeve 12/19/2024 116.0  cm/sec Final    Ao max PG 12/19/2024 5.4  mmHg Final    Ao mean PG 12/19/2024 3.0  mmHg Final    Ao V2 VTI 12/19/2024 21.4  cm Final    CARL(I,D) 12/19/2024 3.2  cm2 Final    MV dec slope 12/19/2024 135.0  cm/sec2 Final    TR max PG 12/19/2024 18.1  mmHg Final    RVSP(TR) 12/19/2024 28.1  mmHg Final    RAP  systole 12/19/2024 10.0  mmHg Final    PA acc time 12/19/2024 0.09  sec Final    PI end-d maeve 12/19/2024 84.2  cm/sec Final    Ao root diam 12/19/2024 4.3  cm Final    ACS 12/19/2024 2.10  cm Final    ABO Type 12/19/2024 A   Final    RH type 12/19/2024 Positive   Final    Glucose 12/18/2024 291 (H)  70 - 130 mg/dL Final    Total Cholesterol 12/19/2024 145  0 - 200 mg/dL Final    Triglycerides 12/19/2024 88  0 - 150 mg/dL Final    HDL Cholesterol 12/19/2024 39 (L)  40 - 60 mg/dL Final    LDL Cholesterol  12/19/2024 89  0 - 100 mg/dL Final    VLDL Cholesterol 12/19/2024 17  5 - 40 mg/dL Final    LDL/HDL Ratio 12/19/2024 2.27   Final    Sed Rate 12/19/2024 20  0 - 20 mm/hr Final    Vitamin B-12 12/19/2024 354  211 - 946 pg/mL Final    Folate 12/19/2024 10.70  4.78 - 24.20 ng/mL Final    PSA 12/19/2024 <0.014  0.000 - 4.000 ng/mL Final    Glucose 12/19/2024 196 (H)  65 - 99 mg/dL Final    BUN 12/19/2024 17  8 - 23 mg/dL Final    Creatinine 12/19/2024 0.92  0.76 - 1.27 mg/dL Final    Sodium 12/19/2024 131 (L)  136 - 145 mmol/L Final    Potassium 12/19/2024 3.6  3.5 - 5.2 mmol/L Final    Slight hemolysis detected by analyzer. Result may be falsely elevated.    Chloride 12/19/2024 95 (L)  98 - 107 mmol/L Final    CO2 12/19/2024 22.9  22.0 - 29.0 mmol/L Final    Calcium 12/19/2024 9.2  8.6 - 10.5 mg/dL Final    BUN/Creatinine Ratio 12/19/2024 18.5  7.0 - 25.0 Final    Anion Gap 12/19/2024 13.1  5.0 - 15.0 mmol/L Final    eGFR 12/19/2024 83.1  >60.0 mL/min/1.73 Final    Glucose 12/19/2024 181 (H)  70 - 130 mg/dL Final    Glucose 12/19/2024 356 (H)  70 - 130 mg/dL Final    Glucose 12/19/2024 167 (H)  70 - 130 mg/dL Final   Lab on 11/19/2024   Component Date Value Ref Range Status    Glucose 11/19/2024 278 (H)  65 - 99 mg/dL Final    BUN 11/19/2024 17  8 - 23 mg/dL Final    Creatinine 11/19/2024 1.09  0.76 - 1.27 mg/dL Final    EGFR Result 11/19/2024 67.8  >60.0 mL/min/1.73 Final    Comment: GFR Normal >60  Chronic Kidney  Disease <60  Kidney Failure <15  The GFR formula is only valid for adults with stable renal  function between ages 18 and 70.      BUN/Creatinine Ratio 11/19/2024 15.6  7.0 - 25.0 Final    Sodium 11/19/2024 137  136 - 145 mmol/L Final    Potassium 11/19/2024 5.5 (H)  3.5 - 5.2 mmol/L Final    Chloride 11/19/2024 100  98 - 107 mmol/L Final    Total CO2 11/19/2024 27.2  22.0 - 29.0 mmol/L Final    Calcium 11/19/2024 9.5  8.6 - 10.5 mg/dL Final    Total Protein 11/19/2024 6.5  6.0 - 8.5 g/dL Final    Albumin 11/19/2024 4.2  3.5 - 5.2 g/dL Final    Globulin 11/19/2024 2.3  gm/dL Final    A/G Ratio 11/19/2024 1.8  g/dL Final    Total Bilirubin 11/19/2024 0.4  0.0 - 1.2 mg/dL Final    Alkaline Phosphatase 11/19/2024 60  39 - 117 U/L Final    AST (SGOT) 11/19/2024 15  1 - 40 U/L Final    ALT (SGPT) 11/19/2024 14  1 - 41 U/L Final    WBC 11/19/2024 6.24  3.40 - 10.80 10*3/mm3 Final    Comment: **Effective December 2, 2024 profile 355901 WBC will be made**    non-orderable as a stand-alone order code.      RBC 11/19/2024 3.93 (L)  4.14 - 5.80 10*6/mm3 Final    Hemoglobin 11/19/2024 11.8 (L)  13.0 - 17.7 g/dL Final    Hematocrit 11/19/2024 36.8 (L)  37.5 - 51.0 % Final    MCV 11/19/2024 93.6  79.0 - 97.0 fL Final    MCH 11/19/2024 30.0  26.6 - 33.0 pg Final    MCHC 11/19/2024 32.1  31.5 - 35.7 g/dL Final    RDW 11/19/2024 13.1  12.3 - 15.4 % Final    Platelets 11/19/2024 122 (L)  140 - 450 10*3/mm3 Final    Hemoglobin A1C 11/19/2024 9.50 (H)  4.80 - 5.60 % Final    Comment: Hemoglobin A1C Ranges:  Increased Risk for Diabetes  5.7% to 6.4%  Diabetes                     >= 6.5%  Diabetic Goal                < 7.0%      Total Cholesterol 11/19/2024 172  0 - 200 mg/dL Final    Comment: Cholesterol Reference Ranges  (U.S. Department of Health and Human Services ATP III  Classifications)  Desirable          <200 mg/dL  Borderline High    200-239 mg/dL  High Risk          >240 mg/dL  Triglyceride Reference Ranges  (U.S. Department  of Health and Human Services ATP III  Classifications)  Normal           <150 mg/dL  Borderline High  150-199 mg/dL  High             200-499 mg/dL  Very High        >500 mg/dL  HDL Reference Ranges  (U.S. Department of Health and Human Services ATP III  Classifications)  Low     <40 mg/dl (major risk factor for CHD)  High    >60 mg/dl ('negative' risk factor for CHD)  LDL Reference Ranges  (U.S. Department of Health and Human Services ATP III  Classifications)  Optimal          <100 mg/dL  Near Optimal     100-129 mg/dL  Borderline High  130-159 mg/dL  High             160-189 mg/dL  Very High        >189 mg/dL      Triglycerides 11/19/2024 127  0 - 150 mg/dL Final    HDL Cholesterol 11/19/2024 37 (L)  40 - 60 mg/dL Final    VLDL Cholesterol [place] 11/19/2024 23  5 - 40 mg/dL Final    LDL Chol Calc (NIH) 11/19/2024 112 (H)  0 - 100 mg/dL Final    Interpretation 11/19/2024 Note   Final    Supplemental report is available.       Assessment and Plan  Diagnoses and all orders for this visit:    1. Dupuytren's contracture of left hand (Primary)  -     Ambulatory Referral to Orthopedic Surgery    2. Type 2 diabetes mellitus without complication, with long-term current use of insulin  -     POC Glycosylated Hemoglobin (Hb A1C)    3. Gastroesophageal reflux disease, unspecified whether esophagitis present  -     omeprazole (priLOSEC) 20 MG capsule; Take 1 capsule by mouth Every Morning.  Dispense: 90 capsule; Refill: 1    4. Prostate cancer  -     bicalutamide (CASODEX) 50 MG tablet; Take 1 tablet by mouth Every Morning.  Dispense: 90 tablet; Refill: 1      Assessment & Plan  1. Dupuytren's contracture.  The condition is similar to the previous one treated by Dr. Shin, who attempted manipulation, but the contracture remains too tight. Surgical intervention is recommended as the only viable option for correction. A referral to Dr. Charly Cortez in [place] will be arranged for further evaluation and potential surgical  intervention.    2. Diabetes mellitus.  His A1c level remains elevated at 9.5. He is advised to maintain consistent use of Ozempic to help lower his A1c levels to under 8. He will continue his Ozempic regimen. He is instructed to complete and submit the necessary paperwork to facilitate the provision of Ozempic at no cost. Lab work will be conducted to monitor his condition.    3. Medication management.  A prescription for metoprolol, sufficient for a 90-day supply, has been issued.    4. History of Prostate cancer  Continue casodex                   New Medications:   New Medications Ordered This Visit   Medications    bicalutamide (CASODEX) 50 MG tablet     Sig: Take 1 tablet by mouth Every Morning.     Dispense:  90 tablet     Refill:  1    omeprazole (priLOSEC) 20 MG capsule     Sig: Take 1 capsule by mouth Every Morning.     Dispense:  90 capsule     Refill:  1       Discontinued Medications:   Medications Discontinued During This Encounter   Medication Reason    bicalutamide (CASODEX) 50 MG tablet Reorder    omeprazole (priLOSEC) 20 MG capsule Reorder                 Follow Up:   Return in about 3 months (around 5/18/2025).    Patient was given instructions and counseling regarding his condition or for health maintenance advice. Please see specific information pulled into the AVS if appropriate.     Patient or patient representative verbalized consent for the use of Ambient Listening during the visit with  Roman Lebron DO for chart documentation. 2/24/2025  08:34 EST       This document has been electronically signed by Roman Lebron DO   February 24, 2025 08:33 EST      Dictated Utilizing Dragon Dictation: Part of this note may be an electronic transcription/translation of spoken language to printed text using the Dragon Dictation System.

## 2025-03-11 DIAGNOSIS — M79.642 HAND PAIN, LEFT: Primary | ICD-10-CM

## 2025-04-16 ENCOUNTER — LAB (OUTPATIENT)
Dept: FAMILY MEDICINE CLINIC | Facility: CLINIC | Age: 85
End: 2025-04-16
Payer: MEDICARE

## 2025-04-16 DIAGNOSIS — R30.0 DYSURIA: Primary | ICD-10-CM

## 2025-04-22 ENCOUNTER — RESULTS FOLLOW-UP (OUTPATIENT)
Dept: FAMILY MEDICINE CLINIC | Facility: CLINIC | Age: 85
End: 2025-04-22
Payer: MEDICARE

## 2025-04-22 DIAGNOSIS — N30.00 ACUTE CYSTITIS WITHOUT HEMATURIA: Primary | ICD-10-CM

## 2025-04-22 LAB
APPEARANCE UR: ABNORMAL
BACTERIA #/AREA URNS HPF: ABNORMAL /HPF
BACTERIA UR CULT: ABNORMAL
BACTERIA UR CULT: ABNORMAL
BILIRUB UR QL STRIP: NEGATIVE
CASTS URNS MICRO: ABNORMAL
COLOR UR: ABNORMAL
EPI CELLS #/AREA URNS HPF: ABNORMAL /HPF
GLUCOSE UR QL STRIP: NEGATIVE
HGB UR QL STRIP: ABNORMAL
KETONES UR QL STRIP: ABNORMAL
LEUKOCYTE ESTERASE UR QL STRIP: ABNORMAL
NITRITE UR QL STRIP: POSITIVE
OTHER ANTIBIOTIC SUSC ISLT: ABNORMAL
PH UR STRIP: 5.5 [PH] (ref 5–8)
PROT UR QL STRIP: ABNORMAL
RBC #/AREA URNS HPF: ABNORMAL /HPF
SP GR UR STRIP: 1.02 (ref 1–1.03)
UROBILINOGEN UR STRIP-MCNC: ABNORMAL MG/DL
WBC #/AREA URNS HPF: ABNORMAL /HPF

## 2025-04-22 RX ORDER — CEFDINIR 300 MG/1
300 CAPSULE ORAL 2 TIMES DAILY
Qty: 20 CAPSULE | Refills: 0 | Status: SHIPPED | OUTPATIENT
Start: 2025-04-22 | End: 2025-04-24 | Stop reason: SDUPTHER

## 2025-04-24 ENCOUNTER — TELEPHONE (OUTPATIENT)
Dept: FAMILY MEDICINE CLINIC | Facility: CLINIC | Age: 85
End: 2025-04-24
Payer: MEDICARE

## 2025-04-24 DIAGNOSIS — N30.00 ACUTE CYSTITIS WITHOUT HEMATURIA: ICD-10-CM

## 2025-04-24 RX ORDER — CEFDINIR 300 MG/1
300 CAPSULE ORAL 2 TIMES DAILY
Qty: 20 CAPSULE | Refills: 0 | Status: SHIPPED | OUTPATIENT
Start: 2025-04-24

## 2025-04-24 NOTE — TELEPHONE ENCOUNTER
Caller: CRYSTAL VANESSA    Relationship: Emergency Contact    Best call back number: [phone]     Which medication are you concerned about: ANTIBIOTIC FOR UTI    What are your concerns: PATIENT HAS NOT STARTED MEDICATION. IT APPEARS THAT IT WAS SENT TO THE WRONG PHARMACY. PLEASE REROUTE TO:    [facility] [address] [place] [address] [phone] [facility] [phone] FX

## 2025-05-05 ENCOUNTER — OFFICE VISIT (OUTPATIENT)
Dept: ORTHOPEDIC SURGERY | Facility: CLINIC | Age: 85
End: 2025-05-05
Payer: MEDICARE

## 2025-05-05 VITALS
HEIGHT: 69 IN | DIASTOLIC BLOOD PRESSURE: 81 MMHG | SYSTOLIC BLOOD PRESSURE: 153 MMHG | HEART RATE: 70 BPM | WEIGHT: 181.22 LBS | BODY MASS INDEX: 26.84 KG/M2

## 2025-05-05 DIAGNOSIS — M72.0: Primary | ICD-10-CM

## 2025-05-05 PROCEDURE — 3077F SYST BP >= 140 MM HG: CPT | Performed by: ORTHOPAEDIC SURGERY

## 2025-05-05 PROCEDURE — 3079F DIAST BP 80-89 MM HG: CPT | Performed by: ORTHOPAEDIC SURGERY

## 2025-05-05 PROCEDURE — 99204 OFFICE O/P NEW MOD 45 MIN: CPT | Performed by: ORTHOPAEDIC SURGERY

## 2025-05-05 NOTE — PROGRESS NOTES
New Patient Visit      Patient: Teddy De Leon  YOB: 1940  Date of Encounter: 05/05/2025        Chief Complaint:   Chief Complaint   Patient presents with    Left Hand - Pain, Initial Evaluation           HPI:   Teddy De Leon, 84 y.o. male, referred by Roman Lebron DO presents for evaluation of the right hand fifth finger.  He underwent surgical excision of Dupuytren's contracture about 5 years ago.  He initially had good response but his contracture returned.  He was then seen by orthopedic surgeon in [place] proximately 1 year ago he received injection and manipulation of the contracture.  He is not satisfied with his result he continues to experience significant flexion contracture fifth finger.  Past medical history includes congestive heart failure previous MI 1994 that is post stenting.  Diabetes and iron deficiency anemia.  He receives insulin.  He also has ejection fraction of approximately 36-40 and cardiac defibrillator.  He is anticoagulated with aspirin.      Active Problem List:  Patient Active Problem List   Diagnosis    Pneumonia of left lower lobe due to infectious organism    ASCVD (arteriosclerotic cardiovascular disease) with previous myocardial infarctions in 1994, status post stenting.    Ischemic cardiomyopathy, with LV ejection fraction of about 25% on recent echo Doppler study in December 2017, compensated.    Type 2 diabetes mellitus with hypoglycemia, with long-term current use of insulin    Mixed hyperlipidemia    Essential hypertension    Back pain    Arrhythmia    Prostate cancer, status post radiation therapy and is currently on long-term chemotherapy.    Iron deficiency anemia    Sternal fracture    LBBB (left bundle branch block)    Cardiac resynchronization therapy defibrillator (CRT-D) in place    Chronic systolic congestive heart failure    Encephalopathy acute           Past Medical History:  Past Medical History:   Diagnosis Date    Arthritis     sites  varies     Coronary artery disease     stent inserted by Dr Palencia     Diabetes mellitus     insulin and po meds     GERD (gastroesophageal reflux disease)     Hyperlipidemia     Injury of back     Iron deficiency anemia 10/31/2016    Myocardial infarction 1998    Prostate cancer     radiation treatment -no surgery     Wears glasses            Past Surgical History:  Past Surgical History:   Procedure Laterality Date    CARDIAC CATHETERIZATION      CARDIAC ELECTROPHYSIOLOGY PROCEDURE N/A 2018    Procedure: Implant ICD - bi ventricular;  Surgeon: Nas Quevedo DO;  Location: [facility] EP INVASIVE LOCATION;  Service: Cardiology    CATARACT EXTRACTION, BILATERAL      2024 and May 2024    COLONOSCOPY      CORONARY STENT PLACEMENT      FRACTURE SURGERY Right     rt arm repair     SKIN BIOPSY      nose -small area           Family History:  Family History   Problem Relation Age of Onset    Cancer Mother         breast    Heart attack Mother     Hypertension Father     Breast cancer Sister     Breast cancer Sister     Breast cancer Sister     No Known Problems Sister     No Known Problems Brother          Social History:  Social History     Socioeconomic History    Marital status:    Tobacco Use    Smoking status: Former     Current packs/day: 0.00     Average packs/day: 2.0 packs/day for 19.0 years (38.0 ttl pk-yrs)     Types: Cigarettes     Start date:      Quit date:      Years since quittin.3    Smokeless tobacco: Former     Types: Chew     Quit date:    Vaping Use    Vaping status: Never Used   Substance and Sexual Activity    Alcohol use: No    Drug use: No    Sexual activity: Defer     Body mass index is 26.75 kg/m².      Medications:  Current Outpatient Medications   Medication Sig Dispense Refill    aspirin 81 MG EC tablet Take 1 tablet by mouth Daily. 30 tablet 5    atorvastatin (LIPITOR) 40 MG tablet Take 1 tablet by mouth Every Night.      bicalutamide (CASODEX) 50 MG  tablet Take 1 tablet by mouth Every Morning. 90 tablet 1    carvedilol (COREG) 6.25 MG tablet Take 1 tablet by mouth 2 (Two) Times a Day With Meals.      cefdinir (OMNICEF) 300 MG capsule Take 1 capsule by mouth 2 (Two) Times a Day. 20 capsule 0    glimepiride (AMARYL) 2 MG tablet Take 1 tablet by mouth 2 (Two) Times a Day. 60 tablet 3    Insulin Glargine (Lantus SoloStar) 100 UNIT/ML injection pen Inject 40 Units under the skin into the appropriate area as directed Daily.      metFORMIN (GLUCOPHAGE) 500 MG tablet Take 2 tablets by mouth 2 (Two) Times a Day.      nabumetone (RELAFEN) 500 MG tablet Take 1 tablet by mouth 2 (Two) Times a Day.      omeprazole (priLOSEC) 20 MG capsule Take 1 capsule by mouth Every Morning. 90 capsule 1    tamsulosin (FLOMAX) 0.4 MG capsule 24 hr capsule Take 1 capsule by mouth Every Night.       No current facility-administered medications for this visit.         Allergies:  No Known Allergies      Review of Systems:   Review of Systems   Constitutional: Negative.  Negative for chills, fatigue and fever.   HENT: Negative.  Negative for congestion, facial swelling, mouth sores, sore throat, trouble swallowing and voice change.    Eyes: Negative.  Negative for pain, discharge and visual disturbance.   Respiratory: Negative.  Negative for apnea, cough, chest tightness, shortness of breath and wheezing.    Cardiovascular: Negative.  Negative for chest pain, palpitations and leg swelling.   Gastrointestinal: Negative.  Negative for abdominal distention, abdominal pain, anal bleeding, constipation, diarrhea, nausea and vomiting.   Endocrine: Negative.  Negative for cold intolerance, heat intolerance, polyphagia and polyuria.   Genitourinary:  Positive for dysuria. Negative for difficulty urinating, flank pain and hematuria.   Musculoskeletal:  Positive for arthralgias.   Skin: Negative.  Negative for color change, pallor, rash and wound.   Allergic/Immunologic: Negative.  Negative for  "environmental allergies, food allergies and immunocompromised state.   Neurological: Negative.  Negative for dizziness, tremors, seizures, syncope, facial asymmetry, speech difficulty, weakness, light-headedness, numbness and headaches.   Hematological: Negative.  Negative for adenopathy. Does not bruise/bleed easily.   Psychiatric/Behavioral: Negative.  Negative for behavioral problems, confusion, dysphoric mood, self-injury, sleep disturbance and suicidal ideas. The patient is not nervous/anxious.          Physical Exam:   Physical Exam  GENERAL: 84 y.o. male, alert and oriented X 3 in no acute distress.   Visit Vitals  /81   Pulse 70   Ht 175.3 cm (69.02\")   Wt 82.2 kg (181 lb 3.5 oz)   BMI 26.75 kg/m²       GENERAL APPEARANCE: Awake, alert & oriented, in no acute distress and well developed, well nourished.   PSYCH: Normal mood and affect  LUNGS: Breathing nonlabored, no wheezing  EYES: Sclera anicteric, pupils equal  CARDIOVASCULAR: Palpable pulses. Capillary refill less than 2 seconds  INTEGUMENTARY: Skin intact, co clubbing, cyanosis  NEUROLOGIC: Normal Sensation  MUSCULOSKELETAL:  Orthopedic Examination:   Right hand reveals scar palmar aspect extending toward fifth finger with fracture of the MCP joint of 30 degrees and PIP joint of 70 degrees.  [place] active flexion and extension but is limited.  He has cord extending into the fifth finger to the PIP joint region.          Assessment & Plan:   84 y.o. male presents for evaluation of Dupuytren's contracture right hand which was operated 5 years ago with digital flexion contracture and also with enzyme injection without improvement.  I think it is reasonable to proceed with surgical excision he understands that he not get complete tension of his fifth finger.  We discussed benefits and risk he wishes to proceed.  He will be referred to his cardiologist for medical clearance and provided he is cleared we will schedule he will return for that.        " ICD-10-CM ICD-9-CM   1. Dupuytren's disease of palm with contracture  M72.0 728.6           Cc:   Roman Lebron,                 This document has been electronically signed by Teddy Cevallos MD   May 5, 2025 11:14 EDT

## 2025-05-07 ENCOUNTER — LAB (OUTPATIENT)
Dept: FAMILY MEDICINE CLINIC | Facility: CLINIC | Age: 85
End: 2025-05-07
Payer: MEDICARE

## 2025-05-07 ENCOUNTER — OFFICE VISIT (OUTPATIENT)
Dept: FAMILY MEDICINE CLINIC | Facility: CLINIC | Age: 85
End: 2025-05-07
Payer: MEDICARE

## 2025-05-07 VITALS
DIASTOLIC BLOOD PRESSURE: 62 MMHG | TEMPERATURE: 96.9 F | OXYGEN SATURATION: 94 % | SYSTOLIC BLOOD PRESSURE: 118 MMHG | HEART RATE: 70 BPM | WEIGHT: 182.8 LBS | BODY MASS INDEX: 27.08 KG/M2 | HEIGHT: 69 IN

## 2025-05-07 DIAGNOSIS — E11.9 TYPE 2 DIABETES MELLITUS WITHOUT COMPLICATION, WITH LONG-TERM CURRENT USE OF INSULIN: Primary | ICD-10-CM

## 2025-05-07 DIAGNOSIS — N30.00 ACUTE CYSTITIS WITHOUT HEMATURIA: ICD-10-CM

## 2025-05-07 DIAGNOSIS — Z79.4 TYPE 2 DIABETES MELLITUS WITHOUT COMPLICATION, WITH LONG-TERM CURRENT USE OF INSULIN: Primary | ICD-10-CM

## 2025-05-07 DIAGNOSIS — K21.9 GASTROESOPHAGEAL REFLUX DISEASE, UNSPECIFIED WHETHER ESOPHAGITIS PRESENT: ICD-10-CM

## 2025-05-07 DIAGNOSIS — C61 PROSTATE CANCER: ICD-10-CM

## 2025-05-07 LAB
EXPIRATION DATE: ABNORMAL
HBA1C MFR BLD: 9.5 % (ref 4.5–5.7)
Lab: ABNORMAL

## 2025-05-07 RX ORDER — BICALUTAMIDE 50 MG/1
50 TABLET, FILM COATED ORAL EVERY MORNING
Qty: 90 TABLET | Refills: 1 | Status: SHIPPED | OUTPATIENT
Start: 2025-05-07

## 2025-05-07 RX ORDER — OMEPRAZOLE 20 MG/1
20 CAPSULE, DELAYED RELEASE ORAL EVERY MORNING
Qty: 90 CAPSULE | Refills: 1 | Status: SHIPPED | OUTPATIENT
Start: 2025-05-07

## 2025-05-07 NOTE — PROGRESS NOTES
Chief Complaint  Diabetes    HPI:   Diabetes     Teddy De Leon is a 84 y.o. male who presents today to [facility] for Diabetes.  History of Present Illness  The patient presents for evaluation of diabetes mellitus, gastroesophageal reflux disease, and a urinary tract infection.    He has been adhering to his Ozempic regimen, although there was an incident where the new medication was mistakenly discarded, leaving him with an empty vial at the time of his next injection. He reports experiencing nausea as a side effect of the medication, but it is generally tolerable and subsides within 2 to 3 days. His current antidiabetic regimen includes metformin 1000 mg twice daily, Amaryl 2 mg twice daily, and Lantus 40 units.    He is currently on an antibiotic regimen for a previously diagnosed E. coli infection, which has shown significant improvement. He reports feeling well since the initiation of the antibiotic treatment.    He is on Prilosec for acid reflux.    He had a stent placed in 1994. He had prostate cancer and underwent TURP. He has been on Casodex indefinitely. He also had radiation and chemotherapy.       Previous History:   Past Medical History:   Diagnosis Date    Arthritis     sites varies     Coronary artery disease 1998    stent inserted by Dr Palencia     Diabetes mellitus     insulin and po meds     GERD (gastroesophageal reflux disease)     Hyperlipidemia     Injury of back     Iron deficiency anemia 10/31/2016    Myocardial infarction 1998    Prostate cancer     radiation treatment -no surgery     Wears glasses       Past Surgical History:   Procedure Laterality Date    CARDIAC CATHETERIZATION      CARDIAC ELECTROPHYSIOLOGY PROCEDURE N/A 09/20/2018    Procedure: Implant ICD - bi ventricular;  Surgeon: Nas Quevedo DO;  Location: [facility] INVASIVE LOCATION;  Service: Cardiology    CATARACT EXTRACTION, BILATERAL      April 2024 and May 2024    COLONOSCOPY       CORONARY STENT PLACEMENT      FRACTURE SURGERY Right     rt arm repair     SKIN BIOPSY      nose -small area      Social History     Socioeconomic History    Marital status:    Tobacco Use    Smoking status: Former     Current packs/day: 0.00     Average packs/day: 2.0 packs/day for 19.0 years (38.0 ttl pk-yrs)     Types: Cigarettes     Start date:      Quit date:      Years since quittin.3    Smokeless tobacco: Former     Types: Chew     Quit date:    Vaping Use    Vaping status: Never Used   Substance and Sexual Activity    Alcohol use: No    Drug use: No    Sexual activity: Defer      Health Maintenance Due   Topic Date Due    DIABETIC FOOT EXAM  Never done    TDAP/TD VACCINES (1 - Tdap) Never done    COLORECTAL CANCER SCREENING  Never done    RSV Vaccine - Adults (1 - 1-dose 75+ series) Never done    DIABETIC EYE EXAM  2022    URINE MICROALBUMIN-CREATININE RATIO (uACR)  2024    COVID-19 Vaccine ( season) 2024        Current Medications:  Current Outpatient Medications   Medication Sig Dispense Refill    aspirin 81 MG EC tablet Take 1 tablet by mouth Daily. 30 tablet 5    atorvastatin (LIPITOR) 40 MG tablet Take 1 tablet by mouth Every Night.      bicalutamide (CASODEX) 50 MG tablet Take 1 tablet by mouth Every Morning. 90 tablet 1    carvedilol (COREG) 6.25 MG tablet Take 1 tablet by mouth 2 (Two) Times a Day With Meals.      glimepiride (AMARYL) 2 MG tablet Take 1 tablet by mouth 2 (Two) Times a Day. 60 tablet 3    Insulin Glargine (Lantus SoloStar) 100 UNIT/ML injection pen Inject 40 Units under the skin into the appropriate area as directed Daily.      metFORMIN (GLUCOPHAGE) 500 MG tablet Take 2 tablets by mouth 2 (Two) Times a Day.      nabumetone (RELAFEN) 500 MG tablet Take 1 tablet by mouth 2 (Two) Times a Day.      omeprazole (priLOSEC) 20 MG capsule Take 1 capsule by mouth Every Morning. 90 capsule 1    tamsulosin (FLOMAX) 0.4 MG capsule 24 hr  "capsule Take 1 capsule by mouth Every Night.      Semaglutide,0.25 or 0.5MG/DOS, (OZEMPIC) 2 MG/1.5ML solution pen-injector Inject 0.25 mg under the skin into the appropriate area as directed 1 (One) Time Per Week. 1.5 mL 0     No current facility-administered medications for this visit.       Allergies:   No Known Allergies    Vitals:   /62 (BP Location: Right arm, Patient Position: Sitting, Cuff Size: Adult)   Pulse 70   Temp 96.9 °F (36.1 °C) (Temporal)   Ht 175.3 cm (69.02\")   Wt 82.9 kg (182 lb 12.8 oz)   SpO2 94%   BMI 26.98 kg/m²     Estimated body mass index is 26.98 kg/m² as calculated from the following:    Height as of this encounter: 175.3 cm (69.02\").    Weight as of this encounter: 82.9 kg (182 lb 12.8 oz).    Teddy De Leon  reports that he quit smoking about 46 years ago. His smoking use included cigarettes. He started smoking about 65 years ago. He has a 38 pack-year smoking history. He quit smokeless tobacco use about 45 years ago.  His smokeless tobacco use included chew.           Physical Exam:   Physical Exam  Constitutional:       Appearance: Normal appearance.   HENT:      Mouth/Throat:      Mouth: Mucous membranes are moist.   Cardiovascular:      Rate and Rhythm: Normal rate and regular rhythm.   Pulmonary:      Effort: Pulmonary effort is normal.      Breath sounds: Normal breath sounds. No wheezing or rhonchi.   Musculoskeletal:         General: Normal range of motion.   Skin:     General: Skin is warm and dry.   Neurological:      Mental Status: He is alert.   Psychiatric:         Mood and Affect: Mood normal.          Lab Results:   Office Visit on 05/07/2025   Component Date Value Ref Range Status    Hemoglobin A1C 05/07/2025 9.5 (A)  4.5 - 5.7 % Final    Lot Number 05/07/2025 [id number]   Final    Expiration Date 05/07/2025 2027-02-17   Final   Lab on 04/16/2025   Component Date Value Ref Range Status    Urine Culture 04/16/2025 Final report (A)   Final    Result 1 " 04/16/2025 Escherichia coli (A)   Final    Comment: Cefazolin with an BETTY <=16 predicts susceptibility to the oral agents  cefaclor, cefdinir, cefpodoxime, cefprozil, cefuroxime, cephalexin,  and loracarbef when used for therapy of uncomplicated urinary tract  infections due to E. coli, Klebsiella pneumoniae, and Proteus  mirabilis.  Greater than 100,000 colony forming units per mL      Susceptibility Testing 04/16/2025 Comment   Final    Comment:       ** S = Susceptible; I = Intermediate; R = Resistant **                     P = Positive; N = Negative              MICS are expressed in micrograms per mL     Antibiotic                 RSLT#1    RSLT#2    RSLT#3    RSLT#4  Amoxicillin/Clavulanic Acid    S  Ampicillin                     S  Cefazolin                      S  Cefepime                       S  Cefoxitin                      S  Cefpodoxime                    S  Ceftriaxone                    S  Ciprofloxacin                  I  Ertapenem                      S  Gentamicin                     S  Levofloxacin                   I  Meropenem                      S  Nitrofurantoin                 S  Piperacillin/Tazobactam        S  Tetracycline                   S  Tobramycin                     S  Trimethoprim/Sulfa             S      Specific [place],  04/16/2025 1.022  1.005 - 1.030 Final    pH,  04/16/2025 5.5  5.0 - 8.0 Final    Color, UA 04/16/2025 See below: (A)   Final    Comment: Dark Yellow  REFERENCE RANGE: Yellow, Straw      Appearance, UA 04/16/2025 Turbid (A)  Clear Final    Leukocytes, UA 04/16/2025 See below: (A)  Negative Final    Large (3+)    Protein 04/16/2025 See below: (A)  Negative Final    100 mg/dL (2+)    Glucose, UA 04/16/2025 Negative  Negative Final    Ketones 04/16/2025 Trace (A)  Negative Final    Blood, UA 04/16/2025 See below: (A)  Negative Final    Large (3+)    Bilirubin, UA 04/16/2025 Negative  Negative Final    Urobilinogen, UA 04/16/2025 Comment   Final    Comment:  1.0 E.U./dL  REFERENCE RANGE: 0.2 - 1.0 E.U./dL      Nitrite, UA 04/16/2025 Positive (A)  Negative Final    WBC, UA 04/16/2025 See below: (A)  /HPF Final    Comment: Too Numerous to Count  REFERENCE RANGE: None Seen, 0-2      RBC, UA 04/16/2025 6-10 (A)  /HPF Final    REFERENCE RANGE: None Seen, 0-2    Epithelial Cells (non renal) 04/16/2025 7-12 (A)  /HPF Final    REFERENCE RANGE: None Seen, 0-2    Cast Type 04/16/2025 Comment   Final    HYALINE CASTS, UA            3-6              /LPF       None Seen  N    Bacteria, UA 04/16/2025 4+ (A)  None Seen /HPF Final   Office Visit on 02/18/2025   Component Date Value Ref Range Status    Hemoglobin A1C 02/18/2025 9.5 (A)  4.5 - 5.7 % Final    Lot Number 02/18/2025 [id number]   Final    Expiration Date 02/18/2025 2026-10-18   Final   Admission on 12/18/2024, Discharged on 12/19/2024   Component Date Value Ref Range Status    Glucose 12/18/2024 251 (H)  65 - 99 mg/dL Final    BUN 12/18/2024 17  8 - 23 mg/dL Final    Creatinine 12/18/2024 0.97  0.76 - 1.27 mg/dL Final    Sodium 12/18/2024 133 (L)  136 - 145 mmol/L Final    Potassium 12/18/2024 4.3  3.5 - 5.2 mmol/L Final    Chloride 12/18/2024 96 (L)  98 - 107 mmol/L Final    CO2 12/18/2024 25.5  22.0 - 29.0 mmol/L Final    Calcium 12/18/2024 9.3  8.6 - 10.5 mg/dL Final    Total Protein 12/18/2024 6.6  6.0 - 8.5 g/dL Final    Albumin 12/18/2024 4.2  3.5 - 5.2 g/dL Final    ALT (SGPT) 12/18/2024 9  1 - 41 U/L Final    AST (SGOT) 12/18/2024 11  1 - 40 U/L Final    Alkaline Phosphatase 12/18/2024 65  39 - 117 U/L Final    Total Bilirubin 12/18/2024 0.7  0.0 - 1.2 mg/dL Final    Globulin 12/18/2024 2.4  gm/dL Final    A/G Ratio 12/18/2024 1.8  g/dL Final    BUN/Creatinine Ratio 12/18/2024 17.5  7.0 - 25.0 Final    Anion Gap 12/18/2024 11.5  5.0 - 15.0 mmol/L Final    eGFR 12/18/2024 77.9  >60.0 mL/min/1.73 Final    Protime 12/18/2024 12.7  12.1 - 14.7 Seconds Final    INR 12/18/2024 0.95  0.90 - 1.10 Final    Color, UA  12/18/2024 Yellow  Yellow, Straw Final    Appearance, UA 12/18/2024 Clear  Clear Final    pH, UA 12/18/2024 5.5  5.0 - 8.0 Final    Specific Gravity, UA 12/18/2024 >1.030 (H)  1.005 - 1.030 Final    Glucose, UA 12/18/2024 500 mg/dL (2+) (A)  Negative Final    Ketones, UA 12/18/2024 Trace (A)  Negative Final    Bilirubin, UA 12/18/2024 Negative  Negative Final    Blood, UA 12/18/2024 Negative  Negative Final    Protein, UA 12/18/2024 Trace (A)  Negative Final    Leuk Esterase, UA 12/18/2024 Negative  Negative Final    Nitrite, UA 12/18/2024 Negative  Negative Final    Urobilinogen, UA 12/18/2024 1.0 E.U./dL  0.2 - 1.0 E.U./dL Final    C-Reactive Protein 12/18/2024 3.31 (H)  0.00 - 0.50 mg/dL Final    Procalcitonin 12/18/2024 0.07  0.00 - 0.25 ng/mL Final    THC, Screen, Urine 12/18/2024 Negative  Negative Final    Phencyclidine (PCP), Urine 12/18/2024 Negative  Negative Final    Cocaine Screen, Urine 12/18/2024 Negative  Negative Final    Methamphetamine, Ur 12/18/2024 Negative  Negative Final    Opiate Screen 12/18/2024 Negative  Negative Final    Amphetamine Screen, Urine 12/18/2024 Negative  Negative Final    Benzodiazepine Screen, Urine 12/18/2024 Negative  Negative Final    Tricyclic Antidepressants Screen 12/18/2024 Negative  Negative Final    Methadone Screen, Urine 12/18/2024 Negative  Negative Final    Barbiturates Screen, Urine 12/18/2024 Negative  Negative Final    Oxycodone Screen, Urine 12/18/2024 Negative  Negative Final    Buprenorphine, Screen, Urine 12/18/2024 Negative  Negative Final    TSH 12/18/2024 0.569  0.270 - 4.200 uIU/mL Final    Blood Culture 12/18/2024 No growth at 5 days   Final    Blood Culture 12/18/2024 No growth at 5 days   Final    Extra Tube 12/18/2024 Hold for add-ons.   Final    Auto resulted.    Extra Tube 12/18/2024 hold for add-on   Final    Auto resulted    Extra Tube 12/18/2024 Hold for add-ons.   Final    Auto resulted.    Extra Tube 12/18/2024 Hold for add-ons.   Final     Auto resulted    WBC 12/18/2024 5.69  3.40 - 10.80 10*3/mm3 Final    RBC 12/18/2024 3.91 (L)  4.14 - 5.80 10*6/mm3 Final    Hemoglobin 12/18/2024 11.9 (L)  13.0 - 17.7 g/dL Final    Hematocrit 12/18/2024 39.0  37.5 - 51.0 % Final    MCV 12/18/2024 99.7 (H)  79.0 - 97.0 fL Final    MCH 12/18/2024 30.4  26.6 - 33.0 pg Final    MCHC 12/18/2024 30.5 (L)  31.5 - 35.7 g/dL Final    RDW 12/18/2024 13.2  12.3 - 15.4 % Final    RDW-SD 12/18/2024 47.9  37.0 - 54.0 fl Final    MPV 12/18/2024 9.6  6.0 - 12.0 fL Final    Platelets 12/18/2024 94 (L)  140 - 450 10*3/mm3 Final    Neutrophil % 12/18/2024 77.4 (H)  42.7 - 76.0 % Final    Lymphocyte % 12/18/2024 10.9 (L)  19.6 - 45.3 % Final    Monocyte % 12/18/2024 10.2  5.0 - 12.0 % Final    Eosinophil % 12/18/2024 0.7  0.3 - 6.2 % Final    Basophil % 12/18/2024 0.4  0.0 - 1.5 % Final    Immature Grans % 12/18/2024 0.4  0.0 - 0.5 % Final    Neutrophils, Absolute 12/18/2024 4.41  1.70 - 7.00 10*3/mm3 Final    Lymphocytes, Absolute 12/18/2024 0.62 (L)  0.70 - 3.10 10*3/mm3 Final    Monocytes, Absolute 12/18/2024 0.58  0.10 - 0.90 10*3/mm3 Final    Eosinophils, Absolute 12/18/2024 0.04  0.00 - 0.40 10*3/mm3 Final    Basophils, Absolute 12/18/2024 0.02  0.00 - 0.20 10*3/mm3 Final    Immature Grans, Absolute 12/18/2024 0.02  0.00 - 0.05 10*3/mm3 Final    nRBC 12/18/2024 0.0  0.0 - 0.2 /100 WBC Final    QT Interval 12/18/2024 416  ms Final    QTC Interval 12/18/2024 474  ms Final    PTT 12/18/2024 24.2 (L)  26.5 - 34.5 seconds Final    ABO Type 12/18/2024 A   Final    RH type 12/18/2024 Positive   Final    Antibody Screen 12/18/2024 Negative   Final    T&S Expiration Date 12/18/2024 12/21/2024 11:59:59 PM   Final    Site 12/18/2024 Left Radial   Final    Flaco's Test 12/18/2024 Positive   Final    pH, Arterial 12/18/2024 7.459 (H)  7.350 - 7.450 pH units Final    83 Value above reference range    pCO2, Arterial 12/18/2024 37.2  35.0 - 45.0 mm Hg Final    pO2, Arterial 12/18/2024 65.6 (L)   83.0 - 108.0 mm Hg Final    84 Value below reference range    HCO3, Arterial 12/18/2024 26.4 (H)  20.0 - 26.0 mmol/L Final    83 Value above reference range    Base Excess, Arterial 12/18/2024 2.6 (H)  0.0 - 2.0 mmol/L Final    O2 Saturation, Arterial 12/18/2024 93.7 (L)  94.0 - 99.0 % Final    84 Value below reference range    Hemoglobin, Blood Gas 12/18/2024 12.4 (L)  14 - 18 g/dL Final    84 Value below reference range    Hematocrit, Blood Gas 12/18/2024 38.1  38.0 - 51.0 % Final    Oxyhemoglobin 12/18/2024 92.2 (L)  94 - 99 % Final    84 Value below reference range    Methemoglobin 12/18/2024 0.20  0.00 - 3.00 % Final    Carboxyhemoglobin 12/18/2024 1.4  0 - 5 % Final    A-a DO2 12/18/2024 35.1  0.0 - 300.0 mmHg Final    CO2 Content 12/18/2024 27.5  22 - 33 mmol/L Final    Barometric Pressure for Blood Gas 12/18/2024 727  mmHg Final    Modality 12/18/2024 Room Air   Final    FIO2 12/18/2024 21  % Final    Ventilator Mode 12/18/2024 NA   Final    Collected by 12/18/2024 [id number]   Final    Meter: [id number]     :  [id number]    Creatinine 12/18/2024 1.00  0.60 - 1.30 mg/dL Final    Serial Number: [id number]:  [id number]    Fentanyl, Urine 12/18/2024 Negative  Negative Final    LVIDd 12/19/2024 4.5  cm Final    LVIDs 12/19/2024 3.3  cm Final    IVSd 12/19/2024 1.19  cm Final    LVPWd 12/19/2024 1.14  cm Final    FS 12/19/2024 26.2  % Final    IVS/LVPW 12/19/2024 1.04  cm Final    ESV(cubed) 12/19/2024 35.9  ml Final    LV Sys Vol (BSA corrected) 12/19/2024 24.8  cm2 Final    EDV(cubed) 12/19/2024 89.3  ml Final    LV [place] Vol (BSA corrected) 12/19/2024 39.9  cm2 Final    LV mass(C)d 12/19/2024 187.9  grams Final    LVOT area 12/19/2024 4.2  cm2 Final    LVOT diam 12/19/2024 2.30  cm Final    EDV(MOD-sp4) 12/19/2024 78.4  ml Final    ESV(MOD-sp4) 12/19/2024 48.8  ml Final    SV(MOD-sp4) 12/19/2024 29.6  ml Final    SVi(MOD-SP4) 12/19/2024 15.1  ml/m2 Final    SVi (LVOT) 12/19/2024 34.4  ml/m2 Final     EF(MOD-sp4) 12/19/2024 37.8  % Final    MV E max maeve 12/19/2024 40.3  cm/sec Final    MV A max maeve 12/19/2024 70.6  cm/sec Final    MV dec time 12/19/2024 0.30  sec Final    MV E/A 12/19/2024 0.57   Final    LA ESV Index (BP) 12/19/2024 25.6  ml/m2 Final    TR max maeve 12/19/2024 213.0  cm/sec Final    SV(LVOT) 12/19/2024 67.7  ml Final    TAPSE (>1.6) 12/19/2024 1.43  cm Final    LA dimension (2D)  12/19/2024 3.5  cm Final    LV V1 max 12/19/2024 84.8  cm/sec Final    LV V1 max PG 12/19/2024 2.9  mmHg Final    LV V1 mean PG 12/19/2024 1.00  mmHg Final    LV V1 VTI 12/19/2024 16.3  cm Final    Ao pk maeve 12/19/2024 116.0  cm/sec Final    Ao max PG 12/19/2024 5.4  mmHg Final    Ao mean PG 12/19/2024 3.0  mmHg Final    Ao V2 VTI 12/19/2024 21.4  cm Final    CARL(I,D) 12/19/2024 3.2  cm2 Final    MV dec slope 12/19/2024 135.0  cm/sec2 Final    TR max PG 12/19/2024 18.1  mmHg Final    RVSP(TR) 12/19/2024 28.1  mmHg Final    RAP systole 12/19/2024 10.0  mmHg Final    PA acc time 12/19/2024 0.09  sec Final    PI end-d maeve 12/19/2024 84.2  cm/sec Final    Ao root diam 12/19/2024 4.3  cm Final    ACS 12/19/2024 2.10  cm Final    ABO Type 12/19/2024 A   Final    RH type 12/19/2024 Positive   Final    Glucose 12/18/2024 291 (H)  70 - 130 mg/dL Final    Total Cholesterol 12/19/2024 145  0 - 200 mg/dL Final    Triglycerides 12/19/2024 88  0 - 150 mg/dL Final    HDL Cholesterol 12/19/2024 39 (L)  40 - 60 mg/dL Final    LDL Cholesterol  12/19/2024 89  0 - 100 mg/dL Final    VLDL Cholesterol 12/19/2024 17  5 - 40 mg/dL Final    LDL/HDL Ratio 12/19/2024 2.27   Final    Sed Rate 12/19/2024 20  0 - 20 mm/hr Final    Vitamin B-12 12/19/2024 354  211 - 946 pg/mL Final    Folate 12/19/2024 10.70  4.78 - 24.20 ng/mL Final    PSA 12/19/2024 <0.014  0.000 - 4.000 ng/mL Final    Glucose 12/19/2024 196 (H)  65 - 99 mg/dL Final    BUN 12/19/2024 17  8 - 23 mg/dL Final    Creatinine 12/19/2024 0.92  0.76 - 1.27 mg/dL Final    Sodium 12/19/2024  131 (L)  136 - 145 mmol/L Final    Potassium 12/19/2024 3.6  3.5 - 5.2 mmol/L Final    Slight hemolysis detected by analyzer. Result may be falsely elevated.    Chloride 12/19/2024 95 (L)  98 - 107 mmol/L Final    CO2 12/19/2024 22.9  22.0 - 29.0 mmol/L Final    Calcium 12/19/2024 9.2  8.6 - 10.5 mg/dL Final    BUN/Creatinine Ratio 12/19/2024 18.5  7.0 - 25.0 Final    Anion Gap 12/19/2024 13.1  5.0 - 15.0 mmol/L Final    eGFR 12/19/2024 83.1  >60.0 mL/min/1.73 Final    Glucose 12/19/2024 181 (H)  70 - 130 mg/dL Final    Glucose 12/19/2024 356 (H)  70 - 130 mg/dL Final    Glucose 12/19/2024 167 (H)  70 - 130 mg/dL Final   Lab on 11/19/2024   Component Date Value Ref Range Status    Glucose 11/19/2024 278 (H)  65 - 99 mg/dL Final    BUN 11/19/2024 17  8 - 23 mg/dL Final    Creatinine 11/19/2024 1.09  0.76 - 1.27 mg/dL Final    EGFR Result 11/19/2024 67.8  >60.0 mL/min/1.73 Final    Comment: GFR Normal >60  Chronic Kidney Disease <60  Kidney Failure <15  The GFR formula is only valid for adults with stable renal  function between ages 18 and 70.      BUN/Creatinine Ratio 11/19/2024 15.6  7.0 - 25.0 Final    Sodium 11/19/2024 137  136 - 145 mmol/L Final    Potassium 11/19/2024 5.5 (H)  3.5 - 5.2 mmol/L Final    Chloride 11/19/2024 100  98 - 107 mmol/L Final    Total CO2 11/19/2024 27.2  22.0 - 29.0 mmol/L Final    Calcium 11/19/2024 9.5  8.6 - 10.5 mg/dL Final    Total Protein 11/19/2024 6.5  6.0 - 8.5 g/dL Final    Albumin 11/19/2024 4.2  3.5 - 5.2 g/dL Final    Globulin 11/19/2024 2.3  gm/dL Final    A/G Ratio 11/19/2024 1.8  g/dL Final    Total Bilirubin 11/19/2024 0.4  0.0 - 1.2 mg/dL Final    Alkaline Phosphatase 11/19/2024 60  39 - 117 U/L Final    AST (SGOT) 11/19/2024 15  1 - 40 U/L Final    ALT (SGPT) 11/19/2024 14  1 - 41 U/L Final    WBC 11/19/2024 6.24  3.40 - 10.80 10*3/mm3 Final    Comment: **Effective December 2, 2024 profile [id number] WBC will be made**    non-orderable as a stand-alone order code.      RBC  11/19/2024 3.93 (L)  4.14 - 5.80 10*6/mm3 Final    Hemoglobin 11/19/2024 11.8 (L)  13.0 - 17.7 g/dL Final    Hematocrit 11/19/2024 36.8 (L)  37.5 - 51.0 % Final    MCV 11/19/2024 93.6  79.0 - 97.0 fL Final    MCH 11/19/2024 30.0  26.6 - 33.0 pg Final    MCHC 11/19/2024 32.1  31.5 - 35.7 g/dL Final    RDW 11/19/2024 13.1  12.3 - 15.4 % Final    Platelets 11/19/2024 122 (L)  140 - 450 10*3/mm3 Final    Hemoglobin A1C 11/19/2024 9.50 (H)  4.80 - 5.60 % Final    Comment: Hemoglobin A1C Ranges:  Increased Risk for Diabetes  5.7% to 6.4%  Diabetes                     >= 6.5%  Diabetic Goal                < 7.0%      Total Cholesterol 11/19/2024 172  0 - 200 mg/dL Final    Comment: Cholesterol Reference Ranges  (U.S. Department of Health and Human Services ATP III  Classifications)  Desirable          <200 mg/dL  Borderline High    200-239 mg/dL  High Risk          >240 mg/dL  Triglyceride Reference Ranges  (U.S. Department of Health and Human Services ATP III  Classifications)  Normal           <150 mg/dL  Borderline High  150-199 mg/dL  High             200-499 mg/dL  Very High        >500 mg/dL  HDL Reference Ranges  (U.S. Department of Health and Human Services ATP III  Classifications)  Low     <40 mg/dl (major risk factor for CHD)  High    >60 mg/dl ('negative' risk factor for CHD)  LDL Reference Ranges  (U.S. Department of Health and Human Services ATP III  Classifications)  Optimal          <100 mg/dL  Near Optimal     100-129 mg/dL  Borderline High  130-159 mg/dL  High             160-189 mg/dL  Very High        >189 mg/dL      Triglycerides 11/19/2024 127  0 - 150 mg/dL Final    HDL Cholesterol 11/19/2024 37 (L)  40 - 60 mg/dL Final    VLDL Cholesterol [place] 11/19/2024 23  5 - 40 mg/dL Final    LDL Chol Calc (NIH) 11/19/2024 112 (H)  0 - 100 mg/dL Final    Interpretation 11/19/2024 Note   Final    Supplemental report is available.       Assessment and Plan  Diagnoses and all orders for this visit:    1. Type 2  diabetes mellitus without complication, with long-term current use of insulin (Primary)  -     POC Glycosylated Hemoglobin (Hb A1C)  -     Cancel: CBC No Differential; Future  -     Cancel: Comprehensive metabolic panel; Future  -     Cancel: Microalbumin / Creatinine Urine Ratio - Urine, Clean Catch; Future  -     Semaglutide,0.25 or 0.5MG/DOS, (OZEMPIC) 2 MG/1.5ML solution pen-injector; Inject 0.25 mg under the skin into the appropriate area as directed 1 (One) Time Per Week.  Dispense: 1.5 mL; Refill: 0    2. Acute cystitis without hematuria    3. Prostate cancer  -     bicalutamide (CASODEX) 50 MG tablet; Take 1 tablet by mouth Every Morning.  Dispense: 90 tablet; Refill: 1    4. Gastroesophageal reflux disease, unspecified whether esophagitis present  -     omeprazole (priLOSEC) 20 MG capsule; Take 1 capsule by mouth Every Morning.  Dispense: 90 capsule; Refill: 1      Assessment & Plan  1. Diabetes Mellitus, with long term insulin use  - A1c level remains elevated at 9.5, indicating no significant change.  - Reports nausea as a side effect of Ozempic, which resolves within 2-3 days.  - Discussed the need for consistency with Ozempic and increasing the dose to 0.5 mg to achieve an A1c < 8.  - Currently on metformin 1000 mg twice a day, Amaryl 2 mg twice a day, and Lantus 40 units daily. Sample of Ozempic 0.25 mg provided, with plans to prescribe 0.5 mg upon receipt of paperwork.    2. Urinary Tract Infection.  - Previous urine culture was positive for E. coli.  - Reports improvement since starting antibiotic treatment.  - Continues on antibiotics, with symptoms resolved.    3. Gastroesophageal Reflux Disease.  - Currently on Prilosec for acid reflux.  - Refills for Prilosec sent to pharmacy.  - No new symptoms reported.    4. Prostate Cancer.  - History of prostate cancer treated with TURP, radiation, and chemoradiation.  - Continues on Casodex indefinitely.  - No new symptoms or changes in treatment plan  discussed.               New Medications:   New Medications Ordered This Visit   Medications    omeprazole (priLOSEC) 20 MG capsule     Sig: Take 1 capsule by mouth Every Morning.     Dispense:  90 capsule     Refill:  1    bicalutamide (CASODEX) 50 MG tablet     Sig: Take 1 tablet by mouth Every Morning.     Dispense:  90 tablet     Refill:  1    Semaglutide,0.25 or 0.5MG/DOS, (OZEMPIC) 2 MG/1.5ML solution pen-injector     Sig: Inject 0.25 mg under the skin into the appropriate area as directed 1 (One) Time Per Week.     Dispense:  1.5 mL     Refill:  0       Discontinued Medications:   Medications Discontinued During This Encounter   Medication Reason    cefdinir (OMNICEF) 300 MG capsule *Therapy completed    bicalutamide (CASODEX) 50 MG tablet Reorder    omeprazole (priLOSEC) 20 MG capsule Reorder                 Follow Up:   Return in about 3 months (around 8/7/2025), or needs labs.    Patient was given instructions and counseling regarding his condition or for health maintenance advice. Please see specific information pulled into the AVS if appropriate.     Patient or patient representative verbalized consent for the use of Ambient Listening during the visit with  Roman Lebron DO for chart documentation. 5/7/2025  16:14 EDT       This document has been electronically signed by Roman Lebron DO   May 7, 2025 16:14 EDT      Dictated Utilizing Dragon Dictation: Part of this note may be an electronic transcription/translation of spoken language to printed text using the Dragon Dictation System.

## 2025-05-08 LAB
ALBUMIN SERPL-MCNC: 4.2 G/DL (ref 3.5–5.2)
ALBUMIN/GLOB SERPL: 1.8 G/DL
ALP SERPL-CCNC: 56 U/L (ref 39–117)
ALT SERPL-CCNC: 17 U/L (ref 1–41)
AST SERPL-CCNC: 17 U/L (ref 1–40)
BILIRUB SERPL-MCNC: 0.4 MG/DL (ref 0–1.2)
BUN SERPL-MCNC: 15 MG/DL (ref 8–23)
BUN/CREAT SERPL: 15.3 (ref 7–25)
CALCIUM SERPL-MCNC: 9.7 MG/DL (ref 8.6–10.5)
CHLORIDE SERPL-SCNC: 101 MMOL/L (ref 98–107)
CO2 SERPL-SCNC: 25.9 MMOL/L (ref 22–29)
CREAT SERPL-MCNC: 0.98 MG/DL (ref 0.76–1.27)
EGFRCR SERPLBLD CKD-EPI 2021: 77 ML/MIN/1.73
ERYTHROCYTE [DISTWIDTH] IN BLOOD BY AUTOMATED COUNT: 13.2 % (ref 12.3–15.4)
GLOBULIN SER CALC-MCNC: 2.3 GM/DL
GLUCOSE SERPL-MCNC: 388 MG/DL (ref 65–99)
HCT VFR BLD AUTO: 37.1 % (ref 37.5–51)
HGB BLD-MCNC: 11.9 G/DL (ref 13–17.7)
MCH RBC QN AUTO: 30.4 PG (ref 26.6–33)
MCHC RBC AUTO-ENTMCNC: 32.1 G/DL (ref 31.5–35.7)
MCV RBC AUTO: 94.6 FL (ref 79–97)
PLATELET # BLD AUTO: 130 10*3/MM3 (ref 140–450)
POTASSIUM SERPL-SCNC: 5.7 MMOL/L (ref 3.5–5.2)
PROT SERPL-MCNC: 6.5 G/DL (ref 6–8.5)
RBC # BLD AUTO: 3.92 10*6/MM3 (ref 4.14–5.8)
SODIUM SERPL-SCNC: 137 MMOL/L (ref 136–145)
WBC # BLD AUTO: 5.7 10*3/MM3 (ref 3.4–10.8)

## 2025-05-09 ENCOUNTER — EXTERNAL PBMM DATA (OUTPATIENT)
Dept: PHARMACY | Facility: OTHER | Age: 85
End: 2025-05-09
Payer: MEDICARE

## 2025-05-13 ENCOUNTER — TELEPHONE (OUTPATIENT)
Dept: ORTHOPEDIC SURGERY | Facility: CLINIC | Age: 85
End: 2025-05-13

## 2025-05-13 ENCOUNTER — OFFICE VISIT (OUTPATIENT)
Dept: CARDIOLOGY | Facility: CLINIC | Age: 85
End: 2025-05-13
Payer: MEDICARE

## 2025-05-13 VITALS
RESPIRATION RATE: 16 BRPM | HEIGHT: 70 IN | WEIGHT: 188 LBS | BODY MASS INDEX: 26.92 KG/M2 | HEART RATE: 69 BPM | DIASTOLIC BLOOD PRESSURE: 74 MMHG | OXYGEN SATURATION: 95 % | SYSTOLIC BLOOD PRESSURE: 143 MMHG

## 2025-05-13 DIAGNOSIS — I50.22 CHRONIC HFREF (HEART FAILURE WITH REDUCED EJECTION FRACTION): ICD-10-CM

## 2025-05-13 DIAGNOSIS — I20.9 ANGINA PECTORIS: ICD-10-CM

## 2025-05-13 DIAGNOSIS — Z95.810 CARDIAC RESYNCHRONIZATION THERAPY DEFIBRILLATOR (CRT-D) IN PLACE: Primary | ICD-10-CM

## 2025-05-13 DIAGNOSIS — R06.09 DOE (DYSPNEA ON EXERTION): ICD-10-CM

## 2025-05-13 DIAGNOSIS — I44.7 LBBB (LEFT BUNDLE BRANCH BLOCK): ICD-10-CM

## 2025-05-13 RX ORDER — CARVEDILOL 12.5 MG/1
12.5 TABLET ORAL 2 TIMES DAILY
Qty: 180 TABLET | Refills: 3 | Status: SHIPPED | OUTPATIENT
Start: 2025-05-13

## 2025-05-13 RX ORDER — FUROSEMIDE 20 MG/1
20 TABLET ORAL DAILY
Qty: 30 TABLET | Refills: 3 | Status: SHIPPED | OUTPATIENT
Start: 2025-05-13

## 2025-05-13 NOTE — PROGRESS NOTES
Roman Lebron, DO  Self Referring    Teddy De Leon  1940 05/13/2025    Subjective     Teddy De Leon is a 84 y.o. male who presents today to [facility] CARDIOLOGY for Surgical Clearance (Finger, Dr. Siddiqui), Coronary Artery Disease (Stents,PPI/ICD,s/p MI  [employer] device), and Med Management (List provided).  History of Present Illness  84-year-old male presents for surgical clearance for finger procedure.    History of coronary artery disease and BiV pacemaker. Under care of Dr. Bentley, next appointment next month. Last consultation in 06/2024. Myocardial infarction 21 years ago. Stress test in 2018. Not prescribed Entresto. No general cardiologist. No chest pain but intermittent exertional dyspnea for 6 months. Persistent cold feet. History of atrial fibrillation managed by Dr. Dickinson. Prescribed metoprolol; experienced tachycardia (200 bpm) after missed dose, requiring hospitalization. Severe dizziness yesterday, impeding ambulation. Current medications: aspirin, atorvastatin, carvedilol.    Scheduled for finger straightening under general anesthesia, pending surgical clearance.    Completed 10-day antibiotics for UTI, last dose yesterday.    Uncontrolled diabetes, A1c 9.5, attributed to dietary indiscretions. On Ozempic and Lantus insulin. Uses Dexcom for glucose monitoring.    Elevated morning blood pressure, normalizes throughout the day.    Left hip fracture 7 months ago.          No Known Allergies:    Current Outpatient Medications:     aspirin 81 MG EC tablet, Take 1 tablet by mouth Daily. (Patient taking differently: Take 1 tablet by mouth 2 (Two) Times a Week.), Disp: 30 tablet, Rfl: 5    atorvastatin (LIPITOR) 40 MG tablet, Take 1 tablet by mouth Every Night., Disp: , Rfl:     bicalutamide (CASODEX) 50 MG tablet, Take 1 tablet by mouth Every Morning., Disp: 90 tablet, Rfl: 1    glimepiride (AMARYL) 2 MG tablet, Take 1 tablet by mouth 2 (Two) Times a  Day., Disp: 60 tablet, Rfl: 3    Insulin Glargine (Lantus SoloStar) 100 UNIT/ML injection pen, Inject 40 Units under the skin into the appropriate area as directed Daily., Disp: , Rfl:     metFORMIN (GLUCOPHAGE) 500 MG tablet, Take 2 tablets by mouth 2 (Two) Times a Day., Disp: , Rfl:     nabumetone (RELAFEN) 500 MG tablet, Take 1 tablet by mouth 2 (Two) Times a Day., Disp: , Rfl:     omeprazole (priLOSEC) 20 MG capsule, Take 1 capsule by mouth Every Morning., Disp: 90 capsule, Rfl: 1    Semaglutide,0.25 or 0.5MG/DOS, (OZEMPIC) 2 MG/1.5ML solution pen-injector, Inject 0.25 mg under the skin into the appropriate area as directed 1 (One) Time Per Week., Disp: 1.5 mL, Rfl: 0    tamsulosin (FLOMAX) 0.4 MG capsule 24 hr capsule, Take 1 capsule by mouth Every Night., Disp: , Rfl:     carvedilol (COREG) 12.5 MG tablet, Take 1 tablet by mouth 2 (Two) Times a Day., Disp: 180 tablet, Rfl: 3    furosemide (LASIX) 20 MG tablet, Take 1 tablet by mouth Daily., Disp: 30 tablet, Rfl: 3    Past Medical History:   Diagnosis Date    Arthritis     sites varies     Coronary artery disease 1998    stent inserted by Dr Palencia     Diabetes mellitus     insulin and po meds     GERD (gastroesophageal reflux disease)     Hyperlipidemia     Injury of back     Iron deficiency anemia 10/31/2016    Myocardial infarction 1998    Prostate cancer     radiation treatment -no surgery     Wears glasses      Past Surgical History:   Procedure Laterality Date    CARDIAC CATHETERIZATION      CARDIAC ELECTROPHYSIOLOGY PROCEDURE N/A 09/20/2018    Procedure: Implant ICD - bi ventricular;  Surgeon: Nas Quevedo DO;  Location: [facility] INVASIVE LOCATION;  Service: Cardiology    CATARACT EXTRACTION, BILATERAL      April 2024 and May 2024    COLONOSCOPY      CORONARY STENT PLACEMENT      FRACTURE SURGERY Right     rt arm repair     SKIN BIOPSY  1993    nose -small area     Family History   Problem Relation Age of Onset    Cancer Mother         breast     "Heart attack Mother     Hypertension Father     Breast cancer Sister     Breast cancer Sister     Breast cancer Sister     No Known Problems Sister     No Known Problems Brother      Social History     Tobacco Use    Smoking status: Former     Current packs/day: 0.00     Average packs/day: 2.0 packs/day for 19.0 years (38.0 ttl pk-yrs)     Types: Cigarettes     Start date:      Quit date:      Years since quittin.3    Smokeless tobacco: Former     Types: Chew     Quit date:    Vaping Use    Vaping status: Never Used   Substance Use Topics    Alcohol use: No    Drug use: No       Objective   Blood pressure 143/74, pulse 69, resp. rate 16, height 177.8 cm (70\"), weight 85.3 kg (188 lb), SpO2 95%.  Body mass index is 26.98 kg/m².    Constitutional:       Appearance: Not in distress.   Pulmonary:      Effort: Pulmonary effort is normal.      Breath sounds: Normal breath sounds.   Cardiovascular:      Normal rate. Regular rhythm. Normal S1. Normal S2.       Murmurs: There is no murmur.   Edema:     Peripheral edema present.     Thigh: bilateral 2+ edema of the thigh.  Skin:     General: Skin is warm.   Neurological:      General: No focal deficit present.           DATA:  Results for orders placed during the hospital encounter of 18    SCANNED - TELEMETRY     Results for orders placed during the hospital encounter of 24    Adult Transthoracic Echo Complete W/ Cont if Necessary Per Protocol    Interpretation Summary    Left ventricular systolic function is moderately decreased. Left ventricular ejection fraction appears to be 36 - 40%.    Left ventricular wall thickness is consistent with concentric hypertrophy.    Left ventricular diastolic function was indeterminate.    Mildly reduced right ventricular systolic function noted.    Saline test results are negative for right to left atrial level shunt.    Estimated right ventricular systolic pressure from tricuspid regurgitation is normal (<35 " mmHg).    Mild dilation of the aortic root is present.    Mild aortic valve regurgitation is present.   Results for orders placed during the hospital encounter of 02/28/18    SCANNED - NUCLEAR STRESS     Results for orders placed during the hospital encounter of 12/18/24    US Carotid Bilateral    Narrative  EXAMINATION: US CAROTID BILATERAL-    Technique: Multiple real-time color Doppler images were acquired of  bilateral carotid arteries.    Stenosis measurements if obtained, were performed by the NASCET or  similar method.      CLINICAL INDICATION:     speech impairment; slurred speech;  R41.82-Altered mental status, unspecified; G45.9-Transient cerebral  ischemic attack, unspecified    COMPARISON:    None immediately available.    FINDINGS:    RIGHT:  No significant intimal thickening or plaque is noted. No occlusion.    RIGHT ICA PSV: 60 cm/s  RIGHT ICA EDV: 13.3 cm/s.  Right ICA/CCA Ratio: 0.9  Anterograde flow is demonstrated in RIGHT vertebral artery.    LEFT:  No significant intimal thickening or plaque is noted. No occlusion.    LEFT ICA PSV: 55 cm/s  LEFT EDV: 13 cm/s.  Left ICA/CCA Ratio: 0.6  Anterograde flow is demonstrated in LEFT vertebral artery.    Impression  Impression:    No evidence of hemodynamically significant plaques or stenosis within  the carotid system at this time.      This report was finalized on 12/19/2024 1:00 PM by Dr. Milad Wong MD.      Lab Results   Component Value Date    .0 (H) 10/29/2016     Lab Results   Component Value Date    PSA <0.014 12/19/2024      Lab Results   Component Value Date    MG 1.9 11/01/2016     Lab Results   Component Value Date    INR 0.95 12/18/2024    INR 0.96 09/16/2018     Lab Results   Component Value Date    CKTOTAL 45 10/29/2016     Lab Results   Component Value Date    CHOL 145 12/19/2024    CHOL 112 10/30/2016    CHLPL 172 11/19/2024     Lab Results   Component Value Date    TRIG 88 12/19/2024    TRIG 127 11/19/2024    TRIG 95  "10/30/2016     Lab Results   Component Value Date    HDL 39 (L) 12/19/2024    HDL 37 (L) 11/19/2024    HDL 25 (L) 10/30/2016     Lab Results   Component Value Date    LDL 89 12/19/2024     (H) 11/19/2024    LDL 68 10/30/2016     No components found for: \"A1C\"      Lab Results   Component Value Date    TSH 0.569 12/18/2024             Invalid input(s): \"LABALBU\", \"PROT\"            Results review: During today's encounter, all relevant clinical data has been reviewed.        ECG 12 Lead    Date/Time: 5/13/2025 11:36 AM  Performed by: Eugenio Webb MD    Authorized by: Eugenio Webb MD          Assessment & Plan    Diagnosis Plan   1. Cardiac resynchronization therapy defibrillator (CRT-D) in place        2. LBBB (left bundle branch block)        3. Chronic HFrEF (heart failure with reduced ejection fraction)  Stress Test With Myocardial Perfusion One Day    carvedilol (COREG) 12.5 MG tablet    ECG 12 Lead    furosemide (LASIX) 20 MG tablet      4. MARTIN (dyspnea on exertion)  Stress Test With Myocardial Perfusion One Day      5. Angina pectoris  Stress Test With Myocardial Perfusion One Day        Assessment & Plan  1. Coronary Artery Disease/chronic HFrEF status post CRT-D.  Reports having dyspnea on exertion.   - EF 36-40%, mild valvular regurgitation.  - 2018 stress test abnormalities managed medically.  - Concern for coronary artery blockage due to uncontrolled diabetes and symptoms.  - Explained tachycardia risks.  - Ordered pharmacological stress test.  - Increase carvedilol to 12.5 mg BID.  - New prescription sent.  - Cardiac catheterization if stress test shows significant abnormalities and symptoms.  - No catheterization if stress test unremarkable and antianginal meds effective.  - Not a candidate for MRA due to hyperkalemia.  Given recent UTI we will hold off on Jardiance.  Will consider adding Entresto in the next visit once hyperkalemia is resolved.   -Given leg swelling.  Will start " patient on Lasix 20 mg p.o. once daily.  Advised the patient to take Lasix until resolution of leg swelling.    2. Diabetes Mellitus:  - A1c 9.5, poorly controlled.  - Follow up with Dr. Lebron.  - Aim for A1c <7.  - Emphasized blood sugar control to prevent complications.  - On Ozempic and Lantus insulin.  - Advised balanced diet, avoid high-sugar foods.    3. Hypertension:  - Elevated morning BP, normalizes throughout day.  - Increase carvedilol to 12.5 mg BID.    5. Surgical Clearance:  - Scheduled for finger straightening under general anesthesia, pending surgical clearance.  - Surgery decision based on stress test results and cardiac status.  Will need to be euvolemic before patient can go for surgery    Follow-up  - Follow up in 4 weeks to discuss stress test results and treatment plan.    Recommendations  Orders Placed This Encounter   Procedures    Stress Test With Myocardial Perfusion One Day    ECG 12 Lead        New Medications:   New Medications Ordered This Visit   Medications    carvedilol (COREG) 12.5 MG tablet     Sig: Take 1 tablet by mouth 2 (Two) Times a Day.     Dispense:  180 tablet     Refill:  3    furosemide (LASIX) 20 MG tablet     Sig: Take 1 tablet by mouth Daily.     Dispense:  30 tablet     Refill:  3       Discontinued Medications:   Medications Discontinued During This Encounter   Medication Reason    carvedilol (COREG) 6.25 MG tablet Dose adjustment        Return in about 4 weeks (around 6/10/2025).    Patient or patient representative verbalized consent for the use of Ambient Listening during the visit with  Eugenio Webb MD for chart documentation. 5/14/2025  17:25 EDT      Thank you for allowing me to participate in the care of Teddy De Leon. Feel free to contact me directly with any further questions or concerns.        This document has been electronically signed by Eugenio Webb MD   May 14, 2025 17:23 EDT    Dictated Utilizing Dragon Dictation: Part of this note may be an  electronic transcription/translation of spoken language to printed text using the Dragon Dictation System.

## 2025-05-13 NOTE — TELEPHONE ENCOUNTER
Caller:  CRYSTAL   Relationship to Patient: WIFE  Phone Number:  [phone]  Reason for Call: CALLED TO INFORM CARDIOLOGY WANTS HIM TO DO A STRESS TEST THEN FOLLOW UP PRIOR TO GIVING HIS AUBREY SO HE IS LOOKING AT ANOTHER 6 WEEKS

## 2025-05-23 ENCOUNTER — HOSPITAL ENCOUNTER (OUTPATIENT)
Dept: NUCLEAR MEDICINE | Facility: HOSPITAL | Age: 85
Discharge: HOME | End: 2025-05-23
Payer: MEDICARE

## 2025-05-23 ENCOUNTER — HOSPITAL ENCOUNTER (OUTPATIENT)
Dept: CARDIOLOGY | Facility: HOSPITAL | Age: 85
Discharge: HOME | End: 2025-05-23
Payer: MEDICARE

## 2025-05-23 DIAGNOSIS — I20.9 ANGINA PECTORIS: ICD-10-CM

## 2025-05-23 DIAGNOSIS — R06.09 DOE (DYSPNEA ON EXERTION): ICD-10-CM

## 2025-05-23 DIAGNOSIS — I50.22 CHRONIC HFREF (HEART FAILURE WITH REDUCED EJECTION FRACTION): ICD-10-CM

## 2025-05-23 LAB
BH CV NUCLEAR PRIOR STUDY: 3
BH CV REST NUCLEAR ISOTOPE DOSE: 10.4 MCI
BH CV STRESS BP STAGE 1: NORMAL
BH CV STRESS COMMENTS STAGE 1: NORMAL
BH CV STRESS DOSE REGADENOSON STAGE 1: 0.4
BH CV STRESS DURATION MIN STAGE 1: 0
BH CV STRESS DURATION SEC STAGE 1: 10
BH CV STRESS HR STAGE 1: 83
BH CV STRESS NUCLEAR ISOTOPE DOSE: 30.2 MCI
BH CV STRESS PROTOCOL 1: NORMAL
BH CV STRESS RECOVERY BP: NORMAL MMHG
BH CV STRESS RECOVERY HR: 77 BPM
BH CV STRESS STAGE 1: 1
MAXIMAL PREDICTED HEART RATE: 138 BPM
PERCENT MAX PREDICTED HR: 60.14 %
SPECT HRT GATED+EF W RNC IV: 38 %
STRESS BASELINE BP: NORMAL MMHG
STRESS BASELINE HR: 71 BPM
STRESS PERCENT HR: 71 %
STRESS POST PEAK BP: NORMAL MMHG
STRESS POST PEAK HR: 83 BPM
STRESS TARGET HR: 117 BPM

## 2025-05-23 PROCEDURE — 25010000002 REGADENOSON 0.4 MG/5ML SOLUTION: Performed by: INTERNAL MEDICINE

## 2025-05-23 PROCEDURE — 93017 CV STRESS TEST TRACING ONLY: CPT

## 2025-05-23 PROCEDURE — A9500 TC99M SESTAMIBI: HCPCS | Performed by: INTERNAL MEDICINE

## 2025-05-23 PROCEDURE — 34310000005 TECHNETIUM SESTAMIBI: Performed by: INTERNAL MEDICINE

## 2025-05-23 PROCEDURE — 78452 HT MUSCLE IMAGE SPECT MULT: CPT

## 2025-05-23 RX ORDER — REGADENOSON 0.08 MG/ML
0.4 INJECTION, SOLUTION INTRAVENOUS
Status: COMPLETED | OUTPATIENT
Start: 2025-05-23 | End: 2025-05-23

## 2025-05-23 RX ADMIN — TECHNETIUM TC 99M SESTAMIBI 1 DOSE: 1 INJECTION INTRAVENOUS at 08:29

## 2025-05-23 RX ADMIN — TECHNETIUM TC 99M SESTAMIBI 1 DOSE: 1 INJECTION INTRAVENOUS at 07:20

## 2025-05-23 RX ADMIN — REGADENOSON 0.4 MG: 0.08 INJECTION, SOLUTION INTRAVENOUS at 08:29

## 2025-06-10 ENCOUNTER — OFFICE VISIT (OUTPATIENT)
Dept: CARDIOLOGY | Facility: CLINIC | Age: 85
End: 2025-06-10
Payer: MEDICARE

## 2025-06-10 ENCOUNTER — LAB (OUTPATIENT)
Dept: LAB | Facility: HOSPITAL | Age: 85
End: 2025-06-10
Payer: MEDICARE

## 2025-06-10 VITALS
SYSTOLIC BLOOD PRESSURE: 139 MMHG | DIASTOLIC BLOOD PRESSURE: 75 MMHG | HEART RATE: 79 BPM | BODY MASS INDEX: 25.83 KG/M2 | WEIGHT: 180.4 LBS | HEIGHT: 70 IN

## 2025-06-10 DIAGNOSIS — I50.22 CHRONIC HFREF (HEART FAILURE WITH REDUCED EJECTION FRACTION): ICD-10-CM

## 2025-06-10 DIAGNOSIS — Z95.810 CARDIAC RESYNCHRONIZATION THERAPY DEFIBRILLATOR (CRT-D) IN PLACE: Primary | ICD-10-CM

## 2025-06-10 DIAGNOSIS — I25.5 ISCHEMIC CARDIOMYOPATHY: ICD-10-CM

## 2025-06-10 DIAGNOSIS — R30.0 DYSURIA: ICD-10-CM

## 2025-06-10 DIAGNOSIS — I44.7 LBBB (LEFT BUNDLE BRANCH BLOCK): ICD-10-CM

## 2025-06-10 LAB
ALBUMIN SERPL-MCNC: 4.3 G/DL (ref 3.5–5.2)
ALBUMIN/GLOB SERPL: 1.6 G/DL
ALP SERPL-CCNC: 60 U/L (ref 39–117)
ALT SERPL W P-5'-P-CCNC: 12 U/L (ref 1–41)
ANION GAP SERPL CALCULATED.3IONS-SCNC: 14.1 MMOL/L (ref 5–15)
AST SERPL-CCNC: 15 U/L (ref 1–40)
BACTERIA UR QL AUTO: ABNORMAL /HPF
BILIRUB SERPL-MCNC: 0.4 MG/DL (ref 0–1.2)
BILIRUB UR QL STRIP: NEGATIVE
BUN SERPL-MCNC: 18 MG/DL (ref 8–23)
BUN/CREAT SERPL: 15.4 (ref 7–25)
CALCIUM SPEC-SCNC: 10.1 MG/DL (ref 8.6–10.5)
CHLORIDE SERPL-SCNC: 99 MMOL/L (ref 98–107)
CLARITY UR: CLEAR
CO2 SERPL-SCNC: 24.9 MMOL/L (ref 22–29)
COLOR UR: YELLOW
CREAT SERPL-MCNC: 1.17 MG/DL (ref 0.76–1.27)
EGFRCR SERPLBLD CKD-EPI 2021: 62.2 ML/MIN/1.73
GLOBULIN UR ELPH-MCNC: 2.7 GM/DL
GLUCOSE SERPL-MCNC: 337 MG/DL (ref 65–99)
GLUCOSE UR STRIP-MCNC: ABNORMAL MG/DL
HGB UR QL STRIP.AUTO: ABNORMAL
HOLD SPECIMEN: NORMAL
HYALINE CASTS UR QL AUTO: ABNORMAL /LPF
KETONES UR QL STRIP: ABNORMAL
LEUKOCYTE ESTERASE UR QL STRIP.AUTO: ABNORMAL
NITRITE UR QL STRIP: NEGATIVE
PH UR STRIP.AUTO: <=5 [PH] (ref 5–8)
POTASSIUM SERPL-SCNC: 5.5 MMOL/L (ref 3.5–5.2)
PROT SERPL-MCNC: 7 G/DL (ref 6–8.5)
PROT UR QL STRIP: ABNORMAL
RBC # UR STRIP: ABNORMAL /HPF
REF LAB TEST METHOD: ABNORMAL
SODIUM SERPL-SCNC: 138 MMOL/L (ref 136–145)
SP GR UR STRIP: 1.03 (ref 1–1.03)
SQUAMOUS #/AREA URNS HPF: ABNORMAL /HPF
UROBILINOGEN UR QL STRIP: ABNORMAL
WBC # UR STRIP: ABNORMAL /HPF

## 2025-06-10 PROCEDURE — 81001 URINALYSIS AUTO W/SCOPE: CPT

## 2025-06-10 PROCEDURE — 80053 COMPREHEN METABOLIC PANEL: CPT

## 2025-06-10 PROCEDURE — 36415 COLL VENOUS BLD VENIPUNCTURE: CPT

## 2025-06-10 RX ORDER — CEFDINIR 300 MG/1
300 CAPSULE ORAL 2 TIMES DAILY
Qty: 20 CAPSULE | Refills: 0 | Status: SHIPPED | OUTPATIENT
Start: 2025-06-10 | End: 2025-06-20

## 2025-06-10 RX ORDER — FUROSEMIDE 20 MG/1
20 TABLET ORAL DAILY
Qty: 30 TABLET | Refills: 3 | Status: SHIPPED | OUTPATIENT
Start: 2025-06-10

## 2025-06-10 NOTE — PROGRESS NOTES
Roman Lebron DO  No ref. provider found    Teddy De Leon  1940  06/10/2025    Subjective     Teddy De Leon is a 84 y.o. male who presents today to [facility] CARDIOLOGY for Results (STRESS).  History of Present Illness  The patient presents for follow up visit.    Lower extremity edema has significantly improved with daily Lasix. No chest pain or new cardiac symptoms. Slight improvement in respiratory function and energy levels following resolution of swelling.    Nausea after Ozempic injection last night. Currently on Ozempic.    Urinary symptoms include dysuria. Previous UTI treated with antibiotics, recurrence suspected. Appointment with Dr. Lebron scheduled.    History of prostate cancer treated with radiation and chemotherapy. PSA levels consistently within normal limits.    Currently on Relafen for pain management.        No Known Allergies:    Current Outpatient Medications:     aspirin 81 MG EC tablet, Take 1 tablet by mouth Daily. (Patient taking differently: Take 1 tablet by mouth 2 (Two) Times a Week.), Disp: 30 tablet, Rfl: 5    atorvastatin (LIPITOR) 40 MG tablet, Take 1 tablet by mouth Every Night., Disp: , Rfl:     bicalutamide (CASODEX) 50 MG tablet, Take 1 tablet by mouth Every Morning., Disp: 90 tablet, Rfl: 1    carvedilol (COREG) 12.5 MG tablet, Take 1 tablet by mouth 2 (Two) Times a Day., Disp: 180 tablet, Rfl: 3    furosemide (LASIX) 20 MG tablet, Take 1 tablet by mouth Daily., Disp: 30 tablet, Rfl: 3    glimepiride (AMARYL) 2 MG tablet, Take 1 tablet by mouth 2 (Two) Times a Day., Disp: 60 tablet, Rfl: 3    Insulin Glargine (Lantus SoloStar) 100 UNIT/ML injection pen, Inject 40 Units under the skin into the appropriate area as directed Daily., Disp: , Rfl:     metFORMIN (GLUCOPHAGE) 500 MG tablet, Take 2 tablets by mouth 2 (Two) Times a Day., Disp: , Rfl:     nabumetone (RELAFEN) 500 MG tablet, Take 1 tablet by mouth 2 (Two) Times a Day., Disp: , Rfl:      omeprazole (priLOSEC) 20 MG capsule, Take 1 capsule by mouth Every Morning., Disp: 90 capsule, Rfl: 1    Semaglutide,0.25 or 0.5MG/DOS, (OZEMPIC) 2 MG/1.5ML solution pen-injector, Inject 0.25 mg under the skin into the appropriate area as directed 1 (One) Time Per Week., Disp: 1.5 mL, Rfl: 0    tamsulosin (FLOMAX) 0.4 MG capsule 24 hr capsule, Take 1 capsule by mouth Every Night., Disp: , Rfl:     cefdinir (OMNICEF) 300 MG capsule, Take 1 capsule by mouth 2 (Two) Times a Day for 10 days., Disp: 20 capsule, Rfl: 0    Past Medical History:   Diagnosis Date    Arthritis     sites varies     Coronary artery disease     stent inserted by Dr Palencia     Diabetes mellitus     insulin and po meds     GERD (gastroesophageal reflux disease)     Hyperlipidemia     Injury of back     Iron deficiency anemia 10/31/2016    Myocardial infarction     Prostate cancer     radiation treatment -no surgery     Wears glasses      Past Surgical History:   Procedure Laterality Date    CARDIAC CATHETERIZATION      CARDIAC ELECTROPHYSIOLOGY PROCEDURE N/A 2018    Procedure: Implant ICD - bi ventricular;  Surgeon: Nas Quevedo DO;  Location: [facility] INVASIVE LOCATION;  Service: Cardiology    CATARACT EXTRACTION, BILATERAL      2024 and May 2024    COLONOSCOPY      CORONARY STENT PLACEMENT      FRACTURE SURGERY Right     rt arm repair     SKIN BIOPSY      nose -small area     Family History   Problem Relation Age of Onset    Cancer Mother         breast    Heart attack Mother     Hypertension Father     Breast cancer Sister     Breast cancer Sister     Breast cancer Sister     No Known Problems Sister     No Known Problems Brother      Social History     Tobacco Use    Smoking status: Former     Current packs/day: 0.00     Average packs/day: 2.0 packs/day for 19.0 years (38.0 ttl pk-yrs)     Types: Cigarettes     Start date:      Quit date:      Years since quittin.4    Smokeless tobacco: Former      "Types: Chew     Quit date: 1980   Vaping Use    Vaping status: Never Used   Substance Use Topics    Alcohol use: No    Drug use: No       Objective   Blood pressure 139/75, pulse 79, height 177.8 cm (70\"), weight 81.8 kg (180 lb 6.4 oz).  Body mass index is 25.88 kg/m².    Constitutional:       Appearance: Not in distress.   Pulmonary:      Effort: Pulmonary effort is normal.      Breath sounds: Normal breath sounds.   Cardiovascular:      Normal rate. Regular rhythm. Normal S1. Normal S2.    Edema:     Peripheral edema absent.   Skin:     General: Skin is warm.   Neurological:      General: No focal deficit present.           DATA:  Results for orders placed during the hospital encounter of 09/20/18    SCANNED - TELEMETRY     Results for orders placed during the hospital encounter of 12/18/24    Adult Transthoracic Echo Complete W/ Cont if Necessary Per Protocol    Interpretation Summary    Left ventricular systolic function is moderately decreased. Left ventricular ejection fraction appears to be 36 - 40%.    Left ventricular wall thickness is consistent with concentric hypertrophy.    Left ventricular diastolic function was indeterminate.    Mildly reduced right ventricular systolic function noted.    Saline test results are negative for right to left atrial level shunt.    Estimated right ventricular systolic pressure from tricuspid regurgitation is normal (<35 mmHg).    Mild dilation of the aortic root is present.    Mild aortic valve regurgitation is present.   Results for orders placed during the hospital encounter of 05/23/25    Stress Test With Myocardial Perfusion One Day    Interpretation Summary  Images from the original result were not included.      Impressions are consistent with an intermediate risk study.    Left ventricular ejection fraction is moderately reduced (Calculated EF = 38%).    Abnormal LV wall motion consistent with moderate hypokinesis of the inferior and septal wall.    Myocardial " perfusion imaging indicates a small-sized infarct located in the inferior wall and septal wall with no significant ischemia noted.    As compared to the prior study from 2/28/2018, no evidence of ischemia in apex otherwise similar infarction in inferior wall and septal wall.     Results for orders placed during the hospital encounter of 12/18/24    US Carotid Bilateral    Narrative  EXAMINATION: US CAROTID BILATERAL-    Technique: Multiple real-time color Doppler images were acquired of  bilateral carotid arteries.    Stenosis measurements if obtained, were performed by the NASCET or  similar method.      CLINICAL INDICATION:     speech impairment; slurred speech;  R41.82-Altered mental status, unspecified; G45.9-Transient cerebral  ischemic attack, unspecified    COMPARISON:    None immediately available.    FINDINGS:    RIGHT:  No significant intimal thickening or plaque is noted. No occlusion.    RIGHT ICA PSV: 60 cm/s  RIGHT ICA EDV: 13.3 cm/s.  Right ICA/CCA Ratio: 0.9  Anterograde flow is demonstrated in RIGHT vertebral artery.    LEFT:  No significant intimal thickening or plaque is noted. No occlusion.    LEFT ICA PSV: 55 cm/s  LEFT EDV: 13 cm/s.  Left ICA/CCA Ratio: 0.6  Anterograde flow is demonstrated in LEFT vertebral artery.    Impression  Impression:    No evidence of hemodynamically significant plaques or stenosis within  the carotid system at this time.      This report was finalized on 12/19/2024 1:00 PM by Dr. Milad Wong MD.      Lab Results   Component Value Date    .0 (H) 10/29/2016     Lab Results   Component Value Date    PSA <0.014 12/19/2024      Lab Results   Component Value Date    MG 1.9 11/01/2016     Lab Results   Component Value Date    INR 0.95 12/18/2024    INR 0.96 09/16/2018     Lab Results   Component Value Date    CKTOTAL 45 10/29/2016     Lab Results   Component Value Date    CHOL 145 12/19/2024    CHOL 112 10/30/2016    CHLPL 172 11/19/2024     Lab Results   Component  "Value Date    TRIG 88 12/19/2024    TRIG 127 11/19/2024    TRIG 95 10/30/2016     Lab Results   Component Value Date    HDL 39 (L) 12/19/2024    HDL 37 (L) 11/19/2024    HDL 25 (L) 10/30/2016     Lab Results   Component Value Date    LDL 89 12/19/2024     (H) 11/19/2024    LDL 68 10/30/2016     No components found for: \"A1C\"      Lab Results   Component Value Date    TSH 0.569 12/18/2024             Invalid input(s): \"LABALBU\", \"PROT\"        Results review: During today's encounter, all relevant clinical data has been reviewed.      Procedures    Assessment & Plan    Diagnosis Plan   1. Cardiac resynchronization therapy defibrillator (CRT-D) in place        2. LBBB (left bundle branch block)        3. Chronic HFrEF (heart failure with reduced ejection fraction)  furosemide (LASIX) 20 MG tablet      4. Ischemic cardiomyopathy, with LV ejection fraction of about 25% on recent echo Doppler study in December 2017, compensated.        5. Dysuria  Urinalysis With Microscopic - Urine, Clean Catch    cefdinir (OMNICEF) 300 MG capsule        Assessment & Plan  1.  Coronary Artery Disease/chronic HFrEF status post CRT-D.   - EF 36-40%, mild valvular regurgitation.   - Continue Lasix in the morning to manage leg swelling and improve breathing and overall well-being.  - Refills provided.  - Stress test results: No evidence of ischemia.   - Not a candidate for MRA due to hyperkalemia.  Given recent UTI we will hold off on Jardiance.  Will consider adding Entresto once hyperkalemia is resolved.   - Continue Coreg 12.5 mg p.o. twice daily, aspirin 81 mg p.o. once daily and Lipitor 40 mg p.o. once daily.  - Patient has not planning to go for finger surgery under general anesthesia anymore.     2. Possible urinary tract infection: Since this is a second episode, concern for BPH leading to obstruction predisposing patient to developing UTIs.   - Dysuria and previous UTI treated with antibiotics.  - Order urinalysis to confirm " diagnosis.  - Urine analysis is showing evidence of UTI.  Will prescribe cefdinir 300 mg p.o. twice daily for 10 days for treatment of UTI.  Called patient and his wife to update about the prescription but phone was not answered.  Left a voicemail for the wife.   - Will need referral to urologist given recurrent UTI.    4. Elevated potassium on last lab work:  - Previous potassium levels slightly elevated.  - Order new CMP to follow up potassium.  - Initiate new heart medication once potassium levels are normal.    6. Pain management:  - Currently taking Relafen (nabumetone).  - Use NSAIDs sparingly due to cardiac condition.    Discussed risks, benefits, and alternatives of treatment, including managing fluid levels with Lasix, electrolyte hydration for nausea, confirming UTI diagnosis with urinalysis before antibiotics, potential urological evaluation for recurrent UTIs and prostate issues, and cautious use of NSAIDs due to cardiac concerns.    Follow-up in 3 months.    Recommendations  Orders Placed This Encounter   Procedures    Urinalysis With Microscopic - Urine, Clean Catch        New Medications:   New Medications Ordered This Visit   Medications    furosemide (LASIX) 20 MG tablet     Sig: Take 1 tablet by mouth Daily.     Dispense:  30 tablet     Refill:  3    cefdinir (OMNICEF) 300 MG capsule     Sig: Take 1 capsule by mouth 2 (Two) Times a Day for 10 days.     Dispense:  20 capsule     Refill:  0       Discontinued Medications:   Medications Discontinued During This Encounter   Medication Reason    furosemide (LASIX) 20 MG tablet Reorder        Return in about 3 months (around 9/10/2025).    Patient or patient representative verbalized consent for the use of Ambient Listening during the visit with  Eugenio Webb MD for chart documentation. 6/10/2025  14:07 EDT      Thank you for allowing me to participate in the care of Teddy De Leon. Feel free to contact me directly with any further questions or  concerns.        This document has been electronically signed by Eugenio Webb MD   [place] 10, 2025 14:07 EDT    Dictated Utilizing Dragon Dictation: Part of this note may be an electronic transcription/translation of spoken language to printed text using the Dragon Dictation System.

## 2025-06-10 NOTE — Clinical Note
Fidel Lebron, Mr. De Leon was reporting dysuria.  UA is showing UTI.  I have prescribed cefdinir again.  Given recurrent UTI and history of prostate cancer, will recommend referral to urology.  Thanks.

## 2025-06-12 ENCOUNTER — TELEPHONE (OUTPATIENT)
Dept: CARDIOLOGY | Facility: CLINIC | Age: 85
End: 2025-06-12
Payer: MEDICARE

## 2025-06-12 NOTE — TELEPHONE ENCOUNTER
Caller: CRYSTAL VANESSA    Relationship to patient: Emergency Contact    Best call back number: [phone]    Patient is needing: PATIENT HAD A MISSED CALL FROM [facility], PLEASE RETURN THE CALL

## 2025-06-12 NOTE — TELEPHONE ENCOUNTER
Hub to relay.     Called pt to advise him of Dr. Pina's message below. No answer.     Eugenio Webb MD to [facility]  (Selected Message)      6/10/25  2:22 PM  Result Note  Urine analysis is showing evidence of UTI.  Will prescribe cefdinir 300 mg p.o. twice daily for 10 days for treatment of UTI.  Called patient and his wife to update about the prescription but phone was not answered.  Left a voicemail for the wife.      Please let the patient know to  the prescription and take the medication.

## 2025-06-13 NOTE — TELEPHONE ENCOUNTER
Hub to relay.     Called pt to advise him of Dr. Pina's message below. No answer.     Eugenio Webb MD to [facility]  (Selected Message)      6/10/25  2:22 PM  Result Note  Urine analysis is showing evidence of UTI.  Will prescribe cefdinir 300 mg p.o. twice daily for 10 days for treatment of UTI.  Called patient and his wife to update about the prescription but phone was not answered.  Left a voicemail for the wife.      Please let the patient know to  the prescription and take the medication.

## 2025-06-16 NOTE — TELEPHONE ENCOUNTER
Hub to relay.     Called pt to advise him of Dr. Pina's message below. No answer.     Eugenio Webb MD to [facility]  (Selected Message)      6/10/25  2:22 PM  Result Note  Urine analysis is showing evidence of UTI.  Will prescribe cefdinir 300 mg p.o. twice daily for 10 days for treatment of UTI.  Called patient and his wife to update about the prescription but phone was not answered.  Left a voicemail for the wife.      Please let the patient know to  the prescription and take the medication.

## 2025-06-18 RX ORDER — ACYCLOVIR 400 MG/1
1 TABLET ORAL
Qty: 9 EACH | Refills: 3 | Status: SHIPPED | OUTPATIENT
Start: 2025-06-18

## 2025-06-23 LAB
MC_CV_MDC_IDC_RATE_1: 180
MC_CV_MDC_IDC_RATE_1: 220
MC_CV_MDC_IDC_SHOCK_MEASURED_IMPEDANCE: 76
MC_CV_MDC_IDC_THERAPIES: NORMAL
MC_CV_MDC_IDC_ZONE_ID: 1
MC_CV_MDC_IDC_ZONE_ID: 2
MDC_IDC_MSMT_BATTERY_REMAINING_LONGEVITY: 60 MO
MDC_IDC_MSMT_BATTERY_REMAINING_PERCENTAGE: 68 %
MDC_IDC_MSMT_BATTERY_STATUS: NORMAL
MDC_IDC_MSMT_CAP_CHARGE_TIME: 10.9
MDC_IDC_MSMT_LEADCHNL_LV_DTM: NORMAL
MDC_IDC_MSMT_LEADCHNL_LV_IMPEDANCE_VALUE: 570
MDC_IDC_MSMT_LEADCHNL_LV_PACING_THRESHOLD_POLARITY: NORMAL
MDC_IDC_MSMT_LEADCHNL_LV_SENSING_INTR_AMPL: 15.7
MDC_IDC_MSMT_LEADCHNL_RA_DTM: NORMAL
MDC_IDC_MSMT_LEADCHNL_RA_IMPEDANCE_VALUE: 493
MDC_IDC_MSMT_LEADCHNL_RA_PACING_THRESHOLD_POLARITY: NORMAL
MDC_IDC_MSMT_LEADCHNL_RA_SENSING_INTR_AMPL: 3
MDC_IDC_MSMT_LEADCHNL_RV_DTM: NORMAL
MDC_IDC_MSMT_LEADCHNL_RV_IMPEDANCE_VALUE: 418
MDC_IDC_MSMT_LEADCHNL_RV_PACING_THRESHOLD_POLARITY: NORMAL
MDC_IDC_PG_IMPLANT_DTM: NORMAL
MDC_IDC_PG_MFG: NORMAL
MDC_IDC_PG_MODEL: NORMAL
MDC_IDC_PG_SERIAL: NORMAL
MDC_IDC_PG_TYPE: NORMAL
MDC_IDC_SESS_DTM: NORMAL
MDC_IDC_SESS_TYPE: NORMAL
MDC_IDC_SET_BRADY_AT_MODE_SWITCH_RATE: 170
MDC_IDC_SET_BRADY_LOWRATE: 60
MDC_IDC_SET_BRADY_MAX_SENSOR_RATE: 130
MDC_IDC_SET_BRADY_MAX_TRACKING_RATE: 130
MDC_IDC_SET_BRADY_MODE: NORMAL
MDC_IDC_SET_BRADY_PAV_DELAY: 120
MDC_IDC_SET_BRADY_SAV_DELAY: 120
MDC_IDC_SET_CRT_LVRV_DELAY: 0
MDC_IDC_SET_CRT_PACED_CHAMBERS: NORMAL
MDC_IDC_SET_LEADCHNL_LV_PACING_AMPLITUDE: 2
MDC_IDC_SET_LEADCHNL_LV_PACING_PULSEWIDTH: 0.5
MDC_IDC_SET_LEADCHNL_RA_PACING_AMPLITUDE: 2
MDC_IDC_SET_LEADCHNL_RA_PACING_POLARITY: NORMAL
MDC_IDC_SET_LEADCHNL_RA_PACING_PULSEWIDTH: 0.5
MDC_IDC_SET_LEADCHNL_RA_SENSING_POLARITY: NORMAL
MDC_IDC_SET_LEADCHNL_RA_SENSING_SENSITIVITY: 0.25
MDC_IDC_SET_LEADCHNL_RV_PACING_AMPLITUDE: 2
MDC_IDC_SET_LEADCHNL_RV_PACING_POLARITY: NORMAL
MDC_IDC_SET_LEADCHNL_RV_PACING_PULSEWIDTH: 0.5
MDC_IDC_SET_LEADCHNL_RV_SENSING_POLARITY: NORMAL
MDC_IDC_SET_LEADCHNL_RV_SENSING_SENSITIVITY: 0.6
MDC_IDC_SET_ZONE_STATUS: NORMAL
MDC_IDC_SET_ZONE_STATUS: NORMAL
MDC_IDC_SET_ZONE_TYPE: NORMAL
MDC_IDC_SET_ZONE_TYPE: NORMAL
MDC_IDC_STAT_AT_BURDEN_PERCENT: 1
MDC_IDC_STAT_BRADY_RA_PERCENT_PACED: 7
MDC_IDC_STAT_BRADY_RV_PERCENT_PACED: 91
MDC_IDC_STAT_CRT_LV_PERCENT_PACED: 92
MDC_IDC_STAT_TACHYTHERAPY_ATP_DELIVERED_RECENT: 0
MDC_IDC_STAT_TACHYTHERAPY_SHOCKS_ABORTED_RECENT: 0
MDC_IDC_STAT_TACHYTHERAPY_SHOCKS_DELIVERED_RECENT: 0

## 2025-07-11 ENCOUNTER — OFFICE VISIT (OUTPATIENT)
Dept: ENDOCRINOLOGY | Facility: CLINIC | Age: 85
End: 2025-07-11
Payer: MEDICARE

## 2025-07-11 VITALS
SYSTOLIC BLOOD PRESSURE: 122 MMHG | OXYGEN SATURATION: 94 % | HEART RATE: 72 BPM | DIASTOLIC BLOOD PRESSURE: 64 MMHG | WEIGHT: 173.6 LBS | BODY MASS INDEX: 24.91 KG/M2

## 2025-07-11 DIAGNOSIS — Z79.4 TYPE 2 DIABETES MELLITUS WITH HYPOGLYCEMIA WITHOUT COMA, WITH LONG-TERM CURRENT USE OF INSULIN: Primary | ICD-10-CM

## 2025-07-11 DIAGNOSIS — E11.649 TYPE 2 DIABETES MELLITUS WITH HYPOGLYCEMIA WITHOUT COMA, WITH LONG-TERM CURRENT USE OF INSULIN: Primary | ICD-10-CM

## 2025-07-11 LAB
EXPIRATION DATE: ABNORMAL
EXPIRATION DATE: ABNORMAL
GLUCOSE BLDC GLUCOMTR-MCNC: 254 MG/DL (ref 70–130)
HBA1C MFR BLD: 9.7 % (ref 4.5–5.7)
Lab: ABNORMAL
Lab: ABNORMAL

## 2025-07-11 NOTE — PROGRESS NOTES
Chief Complaint   Patient presents with    Diabetes     F/u       HPI  Teddy De Leon is a 84 y.o. male who presents to the clinic today for follow-up of type 2 diabetes.  Diabetes was diagnosed in the 2000's. A1c today is 9.7. He is prescribed Lantus 40 units daily, Ozempic 0.25 mg injection weekly, metformin 1,000 mg twice daily, glimepiride 2 mg twice daily and Jardiance 10 mg daily. He reports difficulty with medication compliance, he is most forgetful with taking his Lantus. He does not limit carbs or sugars. He does report some hypoglycemia he is able to treat himself. The patient is currently in the process of getting medication through patient assistance with his PCP. He denies any abdominal pain, nausea, vomiting, yeast infections or UTIs.     Diabetic complications: none  Last optho exam: due and advised  Last microalbumin: today  Last foot exam: next visit   Statin: yes   Lipid panel: today  ACE/ARB: yes   Glucagon: yes    The following portions of the patient's history were reviewed and updated as appropriate: allergies, current medications, past family history, past medical history, past social history, past surgical history, and problem list.    /64 (BP Location: Left arm, Patient Position: Sitting, Cuff Size: Adult)   Pulse 72   Wt 78.7 kg (173 lb 9.6 oz)   SpO2 94%   BMI 24.91 kg/m²    Past Medical History:   Diagnosis Date    Arthritis     sites varies     Coronary artery disease 1998    stent inserted by Dr Palencia     Diabetes mellitus     insulin and po meds     GERD (gastroesophageal reflux disease)     Hyperlipidemia     Injury of back     Iron deficiency anemia 10/31/2016    Myocardial infarction 1998    Prostate cancer     radiation treatment -no surgery     Wears glasses      Past Surgical History:   Procedure Laterality Date    CARDIAC CATHETERIZATION      CARDIAC ELECTROPHYSIOLOGY PROCEDURE N/A 09/20/2018    Procedure: Implant ICD - bi ventricular;  Surgeon: Nas Quevedo DO;   Location: [facility] INVASIVE LOCATION;  Service: Cardiology    CATARACT EXTRACTION, BILATERAL      2024 and May 2024    COLONOSCOPY      CORONARY STENT PLACEMENT      FRACTURE SURGERY Right     rt arm repair     SKIN BIOPSY      nose -small area      Family History   Problem Relation Age of Onset    Cancer Mother         breast    Heart attack Mother     Hypertension Father     Breast cancer Sister     Breast cancer Sister     Breast cancer Sister     No Known Problems Sister     No Known Problems Brother       Social History     Socioeconomic History    Marital status:    Tobacco Use    Smoking status: Former     Current packs/day: 0.00     Average packs/day: 2.0 packs/day for 19.0 years (38.0 ttl pk-yrs)     Types: Cigarettes     Start date:      Quit date:      Years since quittin.5     Passive exposure: Past    Smokeless tobacco: Former     Types: Chew     Quit date:    Vaping Use    Vaping status: Never Used   Substance and Sexual Activity    Alcohol use: No    Drug use: No    Sexual activity: Defer      No Known Allergies   Current Outpatient Medications on File Prior to Visit   Medication Sig Dispense Refill    aspirin 81 MG EC tablet Take 1 tablet by mouth Daily. (Patient taking differently: Take 1 tablet by mouth 2 (Two) Times a Week.) 30 tablet 5    atorvastatin (LIPITOR) 40 MG tablet Take 1 tablet by mouth Every Night.      bicalutamide (CASODEX) 50 MG tablet Take 1 tablet by mouth Every Morning. 90 tablet 1    carvedilol (COREG) 12.5 MG tablet Take 1 tablet by mouth 2 (Two) Times a Day. 180 tablet 3    Continuous Glucose Sensor (Dexcom G7 Sensor) misc Use 1 each Every 10 (Ten) Days. 9 each 3    glimepiride (AMARYL) 2 MG tablet Take 1 tablet by mouth 2 (Two) Times a Day. 60 tablet 3    Insulin Glargine (Lantus SoloStar) 100 UNIT/ML injection pen Inject 40 Units under the skin into the appropriate area as directed Daily.      metFORMIN (GLUCOPHAGE) 500 MG tablet Take 2  tablets by mouth 2 (Two) Times a Day.      nabumetone (RELAFEN) 500 MG tablet Take 1 tablet by mouth 2 (Two) Times a Day.      omeprazole (priLOSEC) 20 MG capsule Take 1 capsule by mouth Every Morning. 90 capsule 1    Semaglutide,0.25 or 0.5MG/DOS, (OZEMPIC) 2 MG/1.5ML solution pen-injector Inject 0.25 mg under the skin into the appropriate area as directed 1 (One) Time Per Week. 1.5 mL 0    tamsulosin (FLOMAX) 0.4 MG capsule 24 hr capsule Take 1 capsule by mouth Every Night.      furosemide (LASIX) 20 MG tablet Take 1 tablet by mouth Daily. (Patient not taking: Reported on 7/11/2025) 30 tablet 3     No current facility-administered medications on file prior to visit.      Review of Systems   Constitutional:  Positive for fatigue.   Gastrointestinal:  Negative for abdominal pain, nausea and vomiting.   Endocrine: Positive for polydipsia, polyphagia and polyuria.   Genitourinary:  Negative for dysuria and frequency.      Physical Exam  Vitals reviewed.   Constitutional:       Appearance: Normal appearance.   HENT:      Head: Normocephalic.   Eyes:      Pupils: Pupils are equal, round, and reactive to light.   Cardiovascular:      Pulses: Normal pulses.   Pulmonary:      Effort: Pulmonary effort is normal.   Abdominal:      Palpations: Abdomen is soft.   Musculoskeletal:      Cervical back: Normal range of motion.   Skin:     General: Skin is warm and dry.   Neurological:      Mental Status: He is alert and oriented to person, place, and time.   Psychiatric:         Mood and Affect: Mood normal.         Behavior: Behavior normal.       Labs/Imaging  CMP:  Lab Results   Component Value Date    BUN 18.0 06/10/2025    CREATININE 1.17 06/10/2025    EGFR 62.2 06/10/2025    BCR 15.4 06/10/2025     06/10/2025    K 5.5 (H) 06/10/2025    CO2 24.9 06/10/2025    CALCIUM 10.1 06/10/2025    ALBUMIN 4.3 06/10/2025    AGRATIO 1.6 06/10/2025    BILITOT 0.4 06/10/2025    ALKPHOS 60 06/10/2025    AST 15 06/10/2025    ALT 12  06/10/2025     Lipid Panel:  Lab Results   Component Value Date    CHOL 145 12/19/2024    TRIG 88 12/19/2024    HDL 39 (L) 12/19/2024    VLDL 17 12/19/2024    LDL 89 12/19/2024     HbA1c:  Lab Results   Component Value Date    HGBA1C 9.7 (A) 07/11/2025    HGBA1C 9.5 (A) 05/07/2025    HGBA1C 9.5 (A) 02/18/2025     Microalbumin:  Lab Results   Component Value Date    MALBCRERATIO 47.3 08/17/2023     TSH:  Lab Results   Component Value Date    TSH 0.569 12/18/2024       Assessment and Plan  Diagnoses and all orders for this visit:    1. Type 2 diabetes mellitus with hypoglycemia without coma, with long-term current use of insulin (Primary)  Assessment & Plan:  -Diabetes is worsening with A1c 9.7.   -Will refill Dexcom through DME  -Discussed importance of medication compliance. Patient needs to take his insulin regularly, as well as his other medications, and wear his Dexcom and we can decide what medication changes need to be made at follow up.   -Continue metformin 1000 mg twice daily  -Continue glimepiride 2 mg twice daily  -Continue Lantus 40 units daily  -Continue Jardiance 10 mg daily  -Continue Ozempic 0.25 mg injection weekly  -Discussed dietary and exercise guidelines with patient. 150 g carbs per day and 150 minutes of exercise per week. Increase protein with complex carbs. Avoid foods high in refined sugars and sugary drinks.   -Discussed the importance of yearly eye exams.  -Discussed Glucagon use and appropriate timing for this.   -S/S hypoglycemia reviewed with Rule of 15's advised.  -Discussed long term risks of untreated/undertreated diabetes including retinopathy, neuropathy, nephropathy, amputations, hospitalizations, MI, CVA.   -Patient has no personal history of pancreatitis, no family history of MEN syndrome or medullary thyroid cancer. Possible side effects including nausea, bloating, other GI upset and rarely pancreatitis were discussed. He was advised to call the office with any symptoms or  concerns.    -Discussed the medication's MOA and protective cardiovascular and renal benefits for diabetes. Medication may cause  more frequent urination particularly upon starting the medication. Take one tablet in the morning with or without food. Encouraged to drink plenty of water as to not get dehydrated, particularly in warmer weather or with activity. Also discussed there may be an increased incidence of UTIs or yeast infections and to monitor for signs/symptoms.       Orders:  -     POC Glycosylated Hemoglobin (Hb A1C)  -     POC Glucose, Blood  -     Lipid Panel  -     Microalbumin / Creatinine Urine Ratio - Urine, Clean Catch  -     TSH       Return in about 3 months (around 10/11/2025). The patient was instructed to contact the clinic with any interval questions or concerns.    Please note that portions of this document were completed with a voice recognition program. Efforts were made to edit the dictations, but occasionally words are mis-transcribed.  This document has been electronically signed by ZBIGNIEW Bustamante  July 13, 2025 21:34 EDT

## 2025-07-14 NOTE — ASSESSMENT & PLAN NOTE
-Diabetes is worsening with A1c 9.7.   -Will refill Dexcom through DME  -Discussed importance of medication compliance. Patient needs to take his insulin regularly, as well as his other medications, and wear his Dexcom and we can decide what medication changes need to be made at follow up.   -Continue metformin 1000 mg twice daily  -Continue glimepiride 2 mg twice daily  -Continue Lantus 40 units daily  -Continue Jardiance 10 mg daily  -Continue Ozempic 0.25 mg injection weekly  -Discussed dietary and exercise guidelines with patient. 150 g carbs per day and 150 minutes of exercise per week. Increase protein with complex carbs. Avoid foods high in refined sugars and sugary drinks.   -Discussed the importance of yearly eye exams.  -Discussed Glucagon use and appropriate timing for this.   -S/S hypoglycemia reviewed with Rule of 15's advised.  -Discussed long term risks of untreated/undertreated diabetes including retinopathy, neuropathy, nephropathy, amputations, hospitalizations, MI, CVA.   -Patient has no personal history of pancreatitis, no family history of MEN syndrome or medullary thyroid cancer. Possible side effects including nausea, bloating, other GI upset and rarely pancreatitis were discussed. He was advised to call the office with any symptoms or concerns.    -Discussed the medication's MOA and protective cardiovascular and renal benefits for diabetes. Medication may cause  more frequent urination particularly upon starting the medication. Take one tablet in the morning with or without food. Encouraged to drink plenty of water as to not get dehydrated, particularly in warmer weather or with activity. Also discussed there may be an increased incidence of UTIs or yeast infections and to monitor for signs/symptoms.

## 2025-07-28 LAB
MC_CV_MDC_IDC_RATE_1: 180
MC_CV_MDC_IDC_RATE_1: 220
MC_CV_MDC_IDC_SHOCK_MEASURED_IMPEDANCE: 67
MC_CV_MDC_IDC_THERAPIES: NORMAL
MC_CV_MDC_IDC_ZONE_ID: 1
MC_CV_MDC_IDC_ZONE_ID: 2
MDC_IDC_MSMT_BATTERY_REMAINING_LONGEVITY: 60 MO
MDC_IDC_MSMT_BATTERY_REMAINING_PERCENTAGE: 67 %
MDC_IDC_MSMT_BATTERY_STATUS: NORMAL
MDC_IDC_MSMT_CAP_CHARGE_TIME: 11
MDC_IDC_MSMT_LEADCHNL_LV_DTM: NORMAL
MDC_IDC_MSMT_LEADCHNL_LV_IMPEDANCE_VALUE: 526
MDC_IDC_MSMT_LEADCHNL_LV_PACING_THRESHOLD_POLARITY: NORMAL
MDC_IDC_MSMT_LEADCHNL_LV_SENSING_INTR_AMPL: 8.8
MDC_IDC_MSMT_LEADCHNL_RA_DTM: NORMAL
MDC_IDC_MSMT_LEADCHNL_RA_IMPEDANCE_VALUE: 449
MDC_IDC_MSMT_LEADCHNL_RA_PACING_THRESHOLD_POLARITY: NORMAL
MDC_IDC_MSMT_LEADCHNL_RA_SENSING_INTR_AMPL: 0.9
MDC_IDC_MSMT_LEADCHNL_RV_DTM: NORMAL
MDC_IDC_MSMT_LEADCHNL_RV_IMPEDANCE_VALUE: 416
MDC_IDC_MSMT_LEADCHNL_RV_PACING_THRESHOLD_POLARITY: NORMAL
MDC_IDC_PG_IMPLANT_DTM: NORMAL
MDC_IDC_PG_MFG: NORMAL
MDC_IDC_PG_MODEL: NORMAL
MDC_IDC_PG_SERIAL: NORMAL
MDC_IDC_PG_TYPE: NORMAL
MDC_IDC_SESS_DTM: NORMAL
MDC_IDC_SESS_TYPE: NORMAL
MDC_IDC_SET_BRADY_AT_MODE_SWITCH_RATE: 170
MDC_IDC_SET_BRADY_LOWRATE: 60
MDC_IDC_SET_BRADY_MAX_SENSOR_RATE: 130
MDC_IDC_SET_BRADY_MAX_TRACKING_RATE: 130
MDC_IDC_SET_BRADY_MODE: NORMAL
MDC_IDC_SET_BRADY_PAV_DELAY: 120
MDC_IDC_SET_BRADY_SAV_DELAY: 120
MDC_IDC_SET_CRT_LVRV_DELAY: 0
MDC_IDC_SET_CRT_PACED_CHAMBERS: NORMAL
MDC_IDC_SET_LEADCHNL_LV_PACING_AMPLITUDE: 2
MDC_IDC_SET_LEADCHNL_LV_PACING_PULSEWIDTH: 0.5
MDC_IDC_SET_LEADCHNL_RA_PACING_AMPLITUDE: 2
MDC_IDC_SET_LEADCHNL_RA_PACING_POLARITY: NORMAL
MDC_IDC_SET_LEADCHNL_RA_PACING_PULSEWIDTH: 0.5
MDC_IDC_SET_LEADCHNL_RA_SENSING_POLARITY: NORMAL
MDC_IDC_SET_LEADCHNL_RA_SENSING_SENSITIVITY: 0.25
MDC_IDC_SET_LEADCHNL_RV_PACING_AMPLITUDE: 2
MDC_IDC_SET_LEADCHNL_RV_PACING_POLARITY: NORMAL
MDC_IDC_SET_LEADCHNL_RV_PACING_PULSEWIDTH: 0.5
MDC_IDC_SET_LEADCHNL_RV_SENSING_POLARITY: NORMAL
MDC_IDC_SET_LEADCHNL_RV_SENSING_SENSITIVITY: 0.6
MDC_IDC_SET_ZONE_STATUS: NORMAL
MDC_IDC_SET_ZONE_STATUS: NORMAL
MDC_IDC_SET_ZONE_TYPE: NORMAL
MDC_IDC_SET_ZONE_TYPE: NORMAL
MDC_IDC_STAT_AT_BURDEN_PERCENT: 1
MDC_IDC_STAT_BRADY_RA_PERCENT_PACED: 7
MDC_IDC_STAT_BRADY_RV_PERCENT_PACED: 91
MDC_IDC_STAT_CRT_LV_PERCENT_PACED: 92
MDC_IDC_STAT_TACHYTHERAPY_ATP_DELIVERED_RECENT: 0
MDC_IDC_STAT_TACHYTHERAPY_SHOCKS_ABORTED_RECENT: 0
MDC_IDC_STAT_TACHYTHERAPY_SHOCKS_DELIVERED_RECENT: 0

## 2025-08-19 ENCOUNTER — TELEPHONE (OUTPATIENT)
Dept: FAMILY MEDICINE CLINIC | Facility: CLINIC | Age: 85
End: 2025-08-19
Payer: MEDICARE

## 2025-08-21 ENCOUNTER — TELEPHONE (OUTPATIENT)
Dept: CARDIOLOGY | Facility: CLINIC | Age: 85
End: 2025-08-21
Payer: MEDICARE

## (undated) DEVICE — ST INF PRI SMRTSTE 20DRP 2VLV 24ML 117

## (undated) DEVICE — DRSNG SURESITE123 4X4.8IN

## (undated) DEVICE — BALN PRESS WEDGE 6F 110CM

## (undated) DEVICE — ADULT, W/LG. BACK PAD, RADIOTRANSPARENT ELEMENT AND LEAD WIRE: Brand: DEFIBRILLATION ELECTRODES

## (undated) DEVICE — LEX ELECTRO PHYSIOLOGY: Brand: MEDLINE INDUSTRIES, INC.

## (undated) DEVICE — MEDI-VAC YANKAUER SUCTION HANDLE W/BULBOUS TIP: Brand: CARDINAL HEALTH

## (undated) DEVICE — Device

## (undated) DEVICE — CANN NASL CO2 DIVIDED A/

## (undated) DEVICE — 3M™ STERI-STRIP™ REINFORCED ADHESIVE SKIN CLOSURES, R1547, 1/2 IN X 4 IN (12 MM X 100 MM), 6 STRIPS/ENVELOPE: Brand: 3M™ STERI-STRIP™

## (undated) DEVICE — TUBING, SUCTION, 1/4" X 10', STRAIGHT: Brand: MEDLINE

## (undated) DEVICE — CAUTERY TIP POLISHER: Brand: DEVON

## (undated) DEVICE — SET PRIMARY GRVTY 10DP MALE LL 104IN

## (undated) DEVICE — DECANTER: Brand: UNBRANDED

## (undated) DEVICE — DECANT BG O JET

## (undated) DEVICE — INTRO TEAR AWAY/LVD W/SD PRT 7F 13CM

## (undated) DEVICE — INTRO TEAR AWAY/LVD W/SD PRT 9F 13CM

## (undated) DEVICE — INTRO TEAR AWAY/LVD W/SD PRT 9.5F 13CM

## (undated) DEVICE — LIMB HOLDER, WRIST/ANKLE: Brand: DEROYAL

## (undated) DEVICE — CATH COURNARD DECCA CSL 6F120CM

## (undated) DEVICE — HI-TORQUE VERSACORE MODIFIED J GUIDE WIRE SYSTEM 175 CM: Brand: HI-TORQUE VERSACORE

## (undated) DEVICE — HI-TORQUE WHISPER ES GUIDE WIRE .014 STRAIGHTTIP 3.0 CM X 190 CM: Brand: HI-TORQUE WHISPER

## (undated) DEVICE — ST EXT IV SMARTSITE 2VLV SP M LL 5ML IV1

## (undated) DEVICE — GUIDE CATHETER: Brand: ACUITY® PRO

## (undated) DEVICE — PENCL E/S HNDSWCH ROCKRBTN HOLSTR 10FT

## (undated) DEVICE — SOL NACL 0.9PCT 1000ML